# Patient Record
Sex: MALE | Race: WHITE | NOT HISPANIC OR LATINO | Employment: FULL TIME | URBAN - METROPOLITAN AREA
[De-identification: names, ages, dates, MRNs, and addresses within clinical notes are randomized per-mention and may not be internally consistent; named-entity substitution may affect disease eponyms.]

---

## 2023-06-16 LAB — HBA1C MFR BLD HPLC: 7.3 %

## 2023-07-20 ENCOUNTER — TELEPHONE (OUTPATIENT)
Dept: GASTROENTEROLOGY | Facility: CLINIC | Age: 58
End: 2023-07-20

## 2023-07-20 ENCOUNTER — OFFICE VISIT (OUTPATIENT)
Dept: GASTROENTEROLOGY | Facility: CLINIC | Age: 58
End: 2023-07-20
Payer: COMMERCIAL

## 2023-07-20 VITALS
DIASTOLIC BLOOD PRESSURE: 70 MMHG | WEIGHT: 212.2 LBS | HEIGHT: 72 IN | BODY MASS INDEX: 28.74 KG/M2 | SYSTOLIC BLOOD PRESSURE: 124 MMHG

## 2023-07-20 DIAGNOSIS — Z12.11 SCREENING FOR COLON CANCER: ICD-10-CM

## 2023-07-20 DIAGNOSIS — R19.4 CHANGE IN BOWEL HABITS: Primary | ICD-10-CM

## 2023-07-20 DIAGNOSIS — R19.7 DIARRHEA, UNSPECIFIED TYPE: ICD-10-CM

## 2023-07-20 PROCEDURE — 99204 OFFICE O/P NEW MOD 45 MIN: CPT | Performed by: INTERNAL MEDICINE

## 2023-07-20 RX ORDER — BUPROPION HYDROCHLORIDE 300 MG/1
300 TABLET ORAL DAILY
COMMUNITY
Start: 2023-06-27

## 2023-07-20 RX ORDER — LISINOPRIL 5 MG/1
5 TABLET ORAL DAILY
COMMUNITY
Start: 2023-06-27

## 2023-07-20 RX ORDER — ROSUVASTATIN CALCIUM 20 MG/1
20 TABLET, COATED ORAL DAILY
COMMUNITY
Start: 2023-06-27

## 2023-07-20 RX ORDER — ESCITALOPRAM OXALATE 20 MG/1
20 TABLET ORAL DAILY
COMMUNITY
Start: 2023-06-27

## 2023-07-20 RX ORDER — CHOLESTYRAMINE 4 G/9G
1 POWDER, FOR SUSPENSION ORAL
Qty: 30 PACKET | Refills: 2 | Status: SHIPPED | OUTPATIENT
Start: 2023-07-20

## 2023-07-20 NOTE — TELEPHONE ENCOUNTER
Scheduled date of colonoscopy (as of today): 8/22/2023  Physician performing colonoscopy: Dr. Ford President  Location of colonoscopy: [facility])  Bowel prep reviewed with patient: Miralax/Dulcolax  Instructions reviewed with patient by: Gave pt instructions packet and diabetic sheet  Clearances: n/a

## 2023-07-20 NOTE — PROGRESS NOTES
[address] [place] [address] [facility] - Outpatient Consultation  Diogenes Morales 66 y.o. male MRN: [id number]  Encounter: [id number]    ASSESSMENT AND PLAN:      1. Change in bowel habits  Acute on chronic issues, began with soft, more frequent stools after cholecystectomy in 2013. More recently, rectal spasm began about 6 months ago. Normal rectal exam today  We will add cholestyramine for suspected bile salt diarrhea, titrating dose as needed  Plan colonoscopy to assess for IBD and microscopic colitis    2. Colon cancer screening  No prior colon cancer screening, diagnostic colonoscopy planned      Followup Appointment: Pending colonoscopy  ______________________________________________________________________    Chief Complaint   Patient presents with   • Trouble having Bowel Movement     Pt states he has difficulty having a bowel movement for couple months. Bowel movements have varying consistency for years (2013). Pt also states he experiences gas when going. Pt has not seen any blood in stool. HPI:   Diogenes Morales is a 66y.o. year old male who presents accompanied by his wife for consultation regarding a change in bowel habits. He began to experience softer more frequent stools since cholecystectomy in 2013. Stools are always loose. He could have up to 5 or 6 stools in the daily mostly in the morning, occasionally have a BM later in the day. Stools are nonurgent and there is no abdominal cramping. He denies any rectal bleeding. Since February he is also noted anal rectal spasticity after moving his bowels. There is no kimberly pain with defecation. This symptom resolves without intervention. Historical Information   History reviewed. No pertinent past medical history.   Past Surgical History:   Procedure Laterality Date   • GALLBLADDER SURGERY  7235   • UMBILICAL HERNIA REPAIR  2013     Social History     Substance and Sexual Activity   Alcohol Use Never     Social History     Substance and Sexual Activity   Drug Use Never     Social History     Tobacco Use   Smoking Status Former   • Years: 10.00   • Types: Cigarettes   Smokeless Tobacco Never     Family History   Problem Relation Age of Onset   • No Known Problems Mother    • No Known Problems Father        Meds/Allergies     Current Outpatient Medications:   •  cholestyramine (QUESTRAN) 4 g packet  •  buPROPion (WELLBUTRIN XL) 300 mg 24 hr tablet  •  escitalopram (LEXAPRO) 20 mg tablet  •  lisinopril (ZESTRIL) 5 mg tablet  •  metFORMIN (GLUCOPHAGE) 1000 MG tablet  •  rosuvastatin (CRESTOR) 20 MG tablet    Allergies   Allergen Reactions   • Atorvastatin Myalgia       PHYSICAL EXAM:    Blood pressure 124/70, height 6' (1.829 m), weight 96.3 kg (212 lb 3.2 oz). Body mass index is 28.78 kg/m². General Appearance: NAD, cooperative, alert  Eyes: Anicteric, PERRLA, EOMI  ENT:  Normocephalic, atraumatic, normal mucosa. Neck:  Supple, symmetrical, trachea midline,   Resp:  Clear to auscultation bilaterally; no rales, rhonchi or wheezing; respirations unlabored   CV:  S1 S2, Regular rate and rhythm; no murmur, rub, or gallop. GI:  Soft, non-tender, non-distended; normal bowel sounds; no masses, no organomegaly   Rectal: Normal-no fissure or tear, normal tone  Musculoskeletal: No cyanosis, clubbing or edema. Normal ROM. Skin:  No jaundice, rashes, or lesions   Heme/Lymph: No palpable cervical lymphadenopathy  Psych: Normal affect, good eye contact  Neuro: No gross deficits, AAOx3    Lab Results:   No results found for: "WBC", "HGB", "HCT", "MCV", "PLT"  No results found for: "NA", "K", "CL", "CO2", "ANIONGAP", "BUN", "CREATININE", "GLUCOSE", "GLUF", "CALCIUM", "CORRECTEDCA", "AST", "ALT", "ALKPHOS", "PROT", "BILITOT", "EGFR"  No results found for: "IRON", "TIBC", "FERRITIN"  No results found for: "LIPASE"    Radiology Results:   No results found. REVIEW OF SYSTEMS:    CONSTITUTIONAL: Denies any fever, chills, rigors, and weight loss.   HEENT: No earache or tinnitus. Denies hearing loss or visual disturbances. CARDIOVASCULAR: No chest pain or palpitations. RESPIRATORY: Denies any cough, hemoptysis, shortness of breath or dyspnea on exertion. GASTROINTESTINAL: As noted in the History of Present Illness. GENITOURINARY: No problems with urination. Denies any hematuria or dysuria. NEUROLOGIC: No dizziness or vertigo, denies headaches. MUSCULOSKELETAL: Denies any muscle or joint pain. SKIN: Denies skin rashes or itching. ENDOCRINE: Denies excessive thirst. Denies intolerance to heat or cold. PSYCHOSOCIAL: Denies depression or anxiety. Denies any recent memory loss.

## 2023-08-09 ENCOUNTER — TELEPHONE (OUTPATIENT)
Dept: GASTROENTEROLOGY | Facility: CLINIC | Age: 58
End: 2023-08-09

## 2023-08-22 ENCOUNTER — ANESTHESIA (OUTPATIENT)
Dept: GASTROENTEROLOGY | Facility: AMBULATORY SURGERY CENTER | Age: 58
End: 2023-08-22

## 2023-08-22 ENCOUNTER — HOSPITAL ENCOUNTER (OUTPATIENT)
Dept: GASTROENTEROLOGY | Facility: AMBULATORY SURGERY CENTER | Age: 58
Discharge: HOME | End: 2023-08-22
Payer: COMMERCIAL

## 2023-08-22 ENCOUNTER — ANESTHESIA EVENT (OUTPATIENT)
Dept: GASTROENTEROLOGY | Facility: AMBULATORY SURGERY CENTER | Age: 58
End: 2023-08-22

## 2023-08-22 VITALS
OXYGEN SATURATION: 99 % | BODY MASS INDEX: 29.8 KG/M2 | WEIGHT: 220 LBS | SYSTOLIC BLOOD PRESSURE: 121 MMHG | RESPIRATION RATE: 19 BRPM | TEMPERATURE: 98.2 F | HEART RATE: 70 BPM | DIASTOLIC BLOOD PRESSURE: 80 MMHG | HEIGHT: 72 IN

## 2023-08-22 DIAGNOSIS — R19.7 DIARRHEA, UNSPECIFIED TYPE: ICD-10-CM

## 2023-08-22 DIAGNOSIS — K62.89 RECTAL MASS: Primary | ICD-10-CM

## 2023-08-22 PROCEDURE — 88342 IMHCHEM/IMCYTCHM 1ST ANTB: CPT | Performed by: PATHOLOGY

## 2023-08-22 PROCEDURE — 45385 COLONOSCOPY W/LESION REMOVAL: CPT | Performed by: INTERNAL MEDICINE

## 2023-08-22 PROCEDURE — 45380 COLONOSCOPY AND BIOPSY: CPT | Performed by: INTERNAL MEDICINE

## 2023-08-22 PROCEDURE — 88305 TISSUE EXAM BY PATHOLOGIST: CPT | Performed by: PATHOLOGY

## 2023-08-22 PROCEDURE — 88341 IMHCHEM/IMCYTCHM EA ADD ANTB: CPT | Performed by: PATHOLOGY

## 2023-08-22 RX ORDER — SODIUM CHLORIDE, SODIUM LACTATE, POTASSIUM CHLORIDE, CALCIUM CHLORIDE 600; 310; 30; 20 MG/100ML; MG/100ML; MG/100ML; MG/100ML
50 INJECTION, SOLUTION INTRAVENOUS CONTINUOUS
Status: DISCONTINUED | OUTPATIENT
Start: 2023-08-22 | End: 2023-08-26 | Stop reason: HOSPADM

## 2023-08-22 RX ORDER — PROPOFOL 10 MG/ML
INJECTION, EMULSION INTRAVENOUS AS NEEDED
Status: DISCONTINUED | OUTPATIENT
Start: 2023-08-22 | End: 2023-08-22

## 2023-08-22 RX ORDER — LIDOCAINE HYDROCHLORIDE 10 MG/ML
INJECTION, SOLUTION EPIDURAL; INFILTRATION; INTRACAUDAL; PERINEURAL AS NEEDED
Status: DISCONTINUED | OUTPATIENT
Start: 2023-08-22 | End: 2023-08-22

## 2023-08-22 RX ADMIN — PROPOFOL 20 MG: 10 INJECTION, EMULSION INTRAVENOUS at 11:46

## 2023-08-22 RX ADMIN — PROPOFOL 150 MG: 10 INJECTION, EMULSION INTRAVENOUS at 11:42

## 2023-08-22 RX ADMIN — LIDOCAINE HYDROCHLORIDE 30 MG: 10 INJECTION, SOLUTION EPIDURAL; INFILTRATION; INTRACAUDAL; PERINEURAL at 11:42

## 2023-08-22 RX ADMIN — PROPOFOL 30 MG: 10 INJECTION, EMULSION INTRAVENOUS at 11:44

## 2023-08-22 RX ADMIN — SODIUM CHLORIDE, SODIUM LACTATE, POTASSIUM CHLORIDE, CALCIUM CHLORIDE 50 ML/HR: 600; 310; 30; 20 INJECTION, SOLUTION INTRAVENOUS at 11:31

## 2023-08-22 RX ADMIN — PROPOFOL 20 MG: 10 INJECTION, EMULSION INTRAVENOUS at 11:48

## 2023-08-22 RX ADMIN — PROPOFOL 20 MG: 10 INJECTION, EMULSION INTRAVENOUS at 11:51

## 2023-08-22 RX ADMIN — PROPOFOL 50 MG: 10 INJECTION, EMULSION INTRAVENOUS at 11:54

## 2023-08-22 RX ADMIN — PROPOFOL 30 MG: 10 INJECTION, EMULSION INTRAVENOUS at 12:01

## 2023-08-22 RX ADMIN — PROPOFOL 30 MG: 10 INJECTION, EMULSION INTRAVENOUS at 11:57

## 2023-08-22 NOTE — ANESTHESIA POSTPROCEDURE EVALUATION
Post-Op Assessment Note    CV Status:  Stable    Pain management: adequate     Mental Status:  Sleepy   Hydration Status:  Euvolemic   PONV Controlled:  Controlled   Airway Patency:  Patent      Post Op Vitals Reviewed: Yes      Staff: Anesthesiologist, CRNA         There were no known notable events for this encounter.     BP 95/56 (08/22/23 1212)    Temp      Pulse 67 (08/22/23 1212)   Resp 16 (08/22/23 1212)    SpO2 96 % (08/22/23 1212)

## 2023-08-22 NOTE — PROGRESS NOTES
Pt given written and verbal d/c instructions with wife at bedside. Dr Semaj Brown reviewed results with pt and discussed having blood work and CT scan done. Wife aware of all future procedures and supportive with pt care.

## 2023-08-22 NOTE — ANESTHESIA PREPROCEDURE EVALUATION
Procedure:  COLONOSCOPY    Relevant Problems   CARDIO   (+) Hyperlipidemia      Endocrine   (+) Diabetes mellitus ([address])        Physical Exam    Airway    Mallampati score: I  TM Distance: >3 FB  Neck ROM: full     Dental   No notable dental hx     Cardiovascular  Cardiovascular exam normal    Pulmonary  Pulmonary exam normal     Other Findings        Anesthesia Plan  ASA Score- 2     Anesthesia Type- IV sedation with anesthesia with ASA Monitors. Additional Monitors:   Airway Plan:     Comment: I discussed risks (reviewed with patient on the anesthesia consent form), benefits and alternatives of monitored sedation including the possibility under sedation to have recall or mild discomfort. .       Plan Factors-    Chart reviewed. Patient summary reviewed. Induction- intravenous. Postoperative Plan-     Informed Consent- Anesthetic plan and risks discussed with patient. I personally reviewed this patient with the CRNA. Discussed and agreed on the Anesthesia Plan with the CRNA. Patricia Aguilar

## 2023-08-24 ENCOUNTER — TELEPHONE (OUTPATIENT)
Dept: GASTROENTEROLOGY | Facility: CLINIC | Age: 58
End: 2023-08-24

## 2023-08-24 DIAGNOSIS — K62.89 RECTAL MASS: Primary | ICD-10-CM

## 2023-08-25 ENCOUNTER — OFFICE VISIT (OUTPATIENT)
Age: 58
End: 2023-08-25
Payer: COMMERCIAL

## 2023-08-25 VITALS — HEIGHT: 72 IN | BODY MASS INDEX: 27.77 KG/M2 | WEIGHT: 205 LBS

## 2023-08-25 DIAGNOSIS — K62.89 RECTAL MASS: Primary | ICD-10-CM

## 2023-08-25 PROCEDURE — 99205 OFFICE O/P NEW HI 60 MIN: CPT | Performed by: COLON & RECTAL SURGERY

## 2023-08-25 PROCEDURE — 88305 TISSUE EXAM BY PATHOLOGIST: CPT | Performed by: PATHOLOGY

## 2023-08-25 NOTE — PROGRESS NOTES
Colon and Rectal Surgery   Collette Pate 66 y.o. male MRN: [id number]   Encounter: [id number]  08/26/23   3:00 PM        ASSESSMENT:    Dilan Hills is a 64yo male, new rectal mass on colonoscopy, pathology as returned adenocarcinoma by the time of this writing. This was his first colonoscopy, additional benign colon polyps. Discussed with him low, locally advanced rectal cancer, his symptoms and location suggest likely sphincter invasion, he has lost ~20lbs in the past few months. Anterior based fungating, fixed rectal tumor, at the dentate line. We discussed staging to include labs/CT/MRI that have been ordered and the likelihood of neoadjuvant chemoradation therapy pending these results prior to consideration of surgical resection, he does understand that with this low a lesion the likelihood of abdominoperineal resection/colostomy is high. PLAN:  Labs/CT imaging already scheduled  MRI rectal cancer protocol ordered, this will be scheduled by navigation  He will be discussed in tumor conference  CC:  DO Dr. Dar Uriostegui, RN  Jeff Agudelo  GI Tumor Conference      HPI  Coleltte Pate is a 66 y.o. male is here today for evaluation of rectal mass. His most recent colonoscopy was on 8/22/23 with Dr. Deangelo Calero which revealed:  • The terminal ileum appeared normal.  • The entire colon appeared normal. Performed random biopsy using biopsy forceps. • 4 subcentimeter polyps were removed  • 12 mm polyp in the mid sigmoid colon; removed by hot snare  • 15 mm polyp in the distal sigmoid colon; removed by hot snare  • Friable, malignant-appearing and ulcerated mass measuring 7 cm x 5 cm in the distal rectum 0 cm from the anal verge, covering one half of the circumference; bleeding occurred after intervention; performed partial removal by cold forceps biopsy    Pathology-  A.  Colon, random colon biopsy:  - Fragments of colonic mucosa with increased intraepithelial lymphocytes and focal surface epithelial damage.  - There is no increase in subepithelial collagen thickness. See note.     Note (A): The above morphologic features may be compatible with lymphocytic (microscopic) colitis in conjunction with appropriate clinical and endoscopic findings.       B. Large Intestine, Transverse Colon, hot snare transverse colon polyp:  - Tubular adenoma, fragments. - Negative for high grade dysplasia/ carcinoma.     C. Large Intestine, Left/Descending Colon, hot snare descending colon polyp:  - Benign colonic mucosa without significant microscopic abnormality.     D. Large Intestine, Sigmoid Colon, hot snare mid sigmoid polyp:  - Tubular adenoma. - Negative for high grade dysplasia/ carcinoma.     E. Large Intestine, Sigmoid Colon, distal sigmoid polyps- hot snare  x1 / cold snare  2:  - Tubular adenoma x 2, negative for high grade dysplasia/ carcinoma. - Hyperplastic polyp, negative for dysplasia/ carcinoma.      F. Rectum, biopsy rectal mass:  - Adenocarcinoma, superficial fragments. See note. - MMR panel is pending.       CEA, CBC, CMP ordered     He is scheduled for CT scan on 9/9/23     He denies a family history of colorectal cancer.      Historical Information   Past Medical History:   Diagnosis Date   • Diabetes mellitus ([address])    • Hyperlipidemia      Past Surgical History:   Procedure Laterality Date   • GALLBLADDER SURGERY  2013   • TONSILLECTOMY     • UMBILICAL HERNIA REPAIR  2013       Meds/Allergies       Current Outpatient Medications:   •  buPROPion (WELLBUTRIN XL) 300 mg 24 hr tablet, Take 300 mg by mouth daily, Disp: , Rfl:   •  cholestyramine (QUESTRAN) 4 g packet, Take 1 packet (4 g total) by mouth daily at bedtime, Disp: 30 packet, Rfl: 2  •  escitalopram (LEXAPRO) 20 mg tablet, Take 20 mg by mouth daily, Disp: , Rfl:   •  lisinopril (ZESTRIL) 5 mg tablet, Take 5 mg by mouth daily, Disp: , Rfl:   •  metFORMIN (GLUCOPHAGE) 1000 MG tablet, Take 1,000 mg by mouth 2 (two) times a day, Disp: , Rfl: •  rosuvastatin (CRESTOR) 20 MG tablet, Take 20 mg by mouth daily, Disp: , Rfl:   No current facility-administered medications for this visit. Allergies   Allergen Reactions   • Atorvastatin Myalgia         Social History   Social History     Substance and Sexual Activity   Alcohol Use Never     Social History     Substance and Sexual Activity   Drug Use Never     Social History     Tobacco Use   Smoking Status Former   • Years: 10.00   • Types: Cigarettes   Smokeless Tobacco Never         Family History:   Family History   Problem Relation Age of Onset   • No Known Problems Mother    • No Known Problems Father        Review of Systems   Constitutional: Positive for unexpected weight change. HENT: Negative. Eyes: Negative. Respiratory: Negative. Cardiovascular: Negative. Gastrointestinal: Positive for rectal pain. Endocrine: Negative. Genitourinary: Negative. Musculoskeletal: Negative. Skin: Negative. Allergic/Immunologic: Negative. Neurological: Negative. Hematological: Negative. Psychiatric/Behavioral: Negative. Objective   Current Vitals:   Vitals:    08/25/23 1257   Weight: 93 kg (205 lb)   Height: 6' (1.829 m)       Physical Exam:  General:no distress  Eyes:perrla/eomi  ENT:moist mucus membranes  Neck:supple  Pulm:no increased work of breathing, clear bilateral  CV:sinus  Abdomen:soft,nontender  Rectal:Anterior based fungating, fixed rectal tumor, at the dentate line. Extremities:no edema      I have spent a total time of 55 minutes on 08/26/23 in caring for this patient including Diagnostic results, Prognosis, Risks and benefits of tx options, Patient and family education, Impressions, Counseling / Coordination of care, Documenting in the medical record, Reviewing / ordering tests, medicine, procedures   and Communicating with other healthcare professionals .

## 2023-08-25 NOTE — RESULT ENCOUNTER NOTE
Left voicemail, 1 year colonoscopy recall  Random biopsies suggest microscopic colitis, will hold off on treatment until surgical plans are confirmed  Expected findings.   Already saw colorectal surgery  Labs, CT and MRI planned

## 2023-08-26 NOTE — PATIENT INSTRUCTIONS
ASSESSMENT:    Sony Ball is a 66yo male, new rectal mass on colonoscopy, pathology as returned adenocarcinoma by the time of this writing. This was his first colonoscopy, additional benign colon polyps. Discussed with him low, locally advanced rectal cancer, his symptoms and location suggest likely sphincter invasion, he has lost ~20lbs in the past few months. Anterior based fungating, fixed rectal tumor, at the dentate line. We discussed staging to include labs/CT/MRI that have been ordered and the likelihood of neoadjuvant chemoradation therapy pending these results prior to consideration of surgical resection, he does understand that with this low a lesion the likelihood of abdominoperineal resection/colostomy is high.     PLAN:  Labs/CT imaging already scheduled  MRI rectal cancer protocol ordered, this will be scheduled by navigation  He will be discussed in tumor conference  CC:  DO Dr. Chey Tovar, RN  Zach More  GI Tumor Conference

## 2023-08-29 ENCOUNTER — PATIENT OUTREACH (OUTPATIENT)
Dept: HEMATOLOGY ONCOLOGY | Facility: CLINIC | Age: 58
End: 2023-08-29

## 2023-08-29 ENCOUNTER — DOCUMENTATION (OUTPATIENT)
Dept: HEMATOLOGY ONCOLOGY | Facility: CLINIC | Age: 58
End: 2023-08-29

## 2023-08-29 ENCOUNTER — PATIENT OUTREACH (OUTPATIENT)
Dept: CASE MANAGEMENT | Facility: HOSPITAL | Age: 58
End: 2023-08-29

## 2023-08-29 DIAGNOSIS — C20 RECTAL ADENOCARCINOMA (HCC): Primary | ICD-10-CM

## 2023-08-29 NOTE — PROGRESS NOTES
Port placement scheduled with IR  for 9/18/2023 [address]   NN Crystal will confirm details with the pt

## 2023-08-29 NOTE — PROGRESS NOTES
NN initial phone outreach to the pt, introduced myself and explained my role to the pt and his wife. I mainly spoke to his wife but the pt was on the call as well. We talked about the next steps since his recent confirmed path report, we discussed resched his MRI and CT sooner than the dates scheduled, I explained I would do what I can to get the scans sched sooner, Colten Goldberg voiced her concern for auth which I reassured her that we would make sure would not interfere w the pt having his scans. I let her know I was going to set Prisca Mendoza up w med onc following his scans and would call her back w the details. We rvwd the newly sched MRI and CT and we sched the pt w Dr. Aida Markham on 9/13. I explained the pt would need to travel to see the physician but this would only be q4-6 wks, he could still be tx at [address] [facility] since they live closest to the [facility]. She agreed to the pt seeing Dr. Aida Markham, I explained the break down in extent and emailed this info to the pt's wife about the standard of treatment for rectal cancer patients, I did stress to her that this was based on the staging of the CT and MRI. Once we had that info then we would decide what course of tx would be best for him. Explained he would most likely have chemo upfront X4 months, chemo/RT X5-6 wks, and then surgery. We spoke about the other options of upfront sx or oral chemo/IV Oxali (3 wks on 1 wk off). She asked that I email her all this information in addition to info on the [facility]. We completed the general assessment, referrals placed for IR for port and onc SW. We talked about the port, and set him up for an appt on 9/18. I reminded her that he will have to get his blood work completed and best would be before his CT scan, she said that he is sched to have blood work on 9/5. Wife has my card and contact info, she knows she can reach out to me anytime for help, provided her w the hopeline contact number in case the pt would need to reach out after hours.  She was very appreciative of all the info and help today.

## 2023-08-29 NOTE — PROGRESS NOTES
[facility] received referral. Patient has consult scheduled with Dr Jose F Morales on 9/13/23. [facility] will follow.

## 2023-08-29 NOTE — PROGRESS NOTES
TB rqst sent to GI ONC TB pool for this pt to be presented on 9/14 as a NEW rectal CA, to be presented by Dr. Nancy Crews.

## 2023-08-29 NOTE — PROGRESS NOTES
In-basket message received from Dr. Ray Acevedo to add patient to the [address] on 9/14/2023. Chart reviewed and prep started. Pending blood work, CT and MRI. I will follow up on results.

## 2023-09-06 LAB
ALBUMIN SERPL-MCNC: 4.7 G/DL (ref 3.8–4.9)
ALBUMIN/GLOB SERPL: 2.2 {RATIO} (ref 1.2–2.2)
ALP SERPL-CCNC: 84 IU/L (ref 44–121)
ALT SERPL-CCNC: 35 IU/L (ref 0–44)
AST SERPL-CCNC: 19 IU/L (ref 0–40)
BILIRUB SERPL-MCNC: 0.8 MG/DL (ref 0–1.2)
BUN SERPL-MCNC: 19 MG/DL (ref 6–24)
BUN/CREAT SERPL: 20 (ref 9–20)
CALCIUM SERPL-MCNC: 9.9 MG/DL (ref 8.7–10.2)
CEA SERPL-MCNC: 36.2 NG/ML (ref 0–4.7)
CHLORIDE SERPL-SCNC: 103 MMOL/L (ref 96–106)
CO2 SERPL-SCNC: 22 MMOL/L (ref 20–29)
CREAT SERPL-MCNC: 0.94 MG/DL (ref 0.76–1.27)
EGFR: 95 ML/MIN/1.73
ERYTHROCYTE [DISTWIDTH] IN BLOOD BY AUTOMATED COUNT: 12.5 % (ref 11.6–15.4)
GLOBULIN SER-MCNC: 2.1 G/DL (ref 1.5–4.5)
GLUCOSE SERPL-MCNC: 153 MG/DL (ref 70–99)
HCT VFR BLD AUTO: 47 % (ref 37.5–51)
HGB BLD-MCNC: 16 G/DL (ref 13–17.7)
MCH RBC QN AUTO: 32.1 PG (ref 26.6–33)
MCHC RBC AUTO-ENTMCNC: 34 G/DL (ref 31.5–35.7)
MCV RBC AUTO: 94 FL (ref 79–97)
PLATELET # BLD AUTO: 267 X10E3/UL (ref 150–450)
POTASSIUM SERPL-SCNC: 4.9 MMOL/L (ref 3.5–5.2)
PROT SERPL-MCNC: 6.8 G/DL (ref 6–8.5)
RBC # BLD AUTO: 4.98 X10E6/UL (ref 4.14–5.8)
SODIUM SERPL-SCNC: 141 MMOL/L (ref 134–144)
WBC # BLD AUTO: 7.7 X10E3/UL (ref 3.4–10.8)

## 2023-09-07 ENCOUNTER — HOSPITAL ENCOUNTER (OUTPATIENT)
Dept: CT IMAGING | Facility: HOSPITAL | Age: 58
Discharge: HOME | End: 2023-09-07
Attending: INTERNAL MEDICINE
Payer: COMMERCIAL

## 2023-09-07 DIAGNOSIS — K62.89 RECTAL MASS: ICD-10-CM

## 2023-09-07 PROCEDURE — G1004 CDSM NDSC: HCPCS

## 2023-09-07 PROCEDURE — 74177 CT ABD & PELVIS W/CONTRAST: CPT

## 2023-09-07 PROCEDURE — 71260 CT THORAX DX C+: CPT

## 2023-09-07 RX ORDER — SODIUM CHLORIDE 9 MG/ML
30 INJECTION, SOLUTION INTRAVENOUS CONTINUOUS
OUTPATIENT
Start: 2023-09-07

## 2023-09-07 RX ORDER — CEFAZOLIN SODIUM 2 G/50ML
2000 SOLUTION INTRAVENOUS ONCE
OUTPATIENT
Start: 2023-09-18

## 2023-09-07 RX ADMIN — IOHEXOL 80 ML: 350 INJECTION, SOLUTION INTRAVENOUS at 06:50

## 2023-09-08 NOTE — PROGRESS NOTES
CEA completed on 9/5, CT completed on 9/7 no final read as of yet. MRI scheduled for 9/11. I will call radiology reading room on 9/12 AM for STAT read of MRI and follow up on CT results.

## 2023-09-11 ENCOUNTER — HOSPITAL ENCOUNTER (OUTPATIENT)
Facility: MEDICAL CENTER | Age: 58
Discharge: HOME | End: 2023-09-11
Attending: COLON & RECTAL SURGERY
Payer: COMMERCIAL

## 2023-09-11 ENCOUNTER — TELEPHONE (OUTPATIENT)
Dept: INTERVENTIONAL RADIOLOGY/VASCULAR | Facility: HOSPITAL | Age: 58
End: 2023-09-11

## 2023-09-11 DIAGNOSIS — K62.89 RECTAL MASS: ICD-10-CM

## 2023-09-11 PROCEDURE — G1004 CDSM NDSC: HCPCS

## 2023-09-11 PROCEDURE — 72195 MRI PELVIS W/O DYE: CPT

## 2023-09-13 ENCOUNTER — CONSULT (OUTPATIENT)
Dept: HEMATOLOGY ONCOLOGY | Facility: CLINIC | Age: 58
End: 2023-09-13
Payer: COMMERCIAL

## 2023-09-13 ENCOUNTER — TELEPHONE (OUTPATIENT)
Dept: HEMATOLOGY ONCOLOGY | Facility: CLINIC | Age: 58
End: 2023-09-13

## 2023-09-13 ENCOUNTER — DOCUMENTATION (OUTPATIENT)
Dept: OTHER | Facility: HOSPITAL | Age: 58
End: 2023-09-13

## 2023-09-13 VITALS
TEMPERATURE: 98.8 F | HEART RATE: 75 BPM | WEIGHT: 213 LBS | OXYGEN SATURATION: 98 % | HEIGHT: 72 IN | SYSTOLIC BLOOD PRESSURE: 118 MMHG | RESPIRATION RATE: 16 BRPM | BODY MASS INDEX: 28.85 KG/M2 | DIASTOLIC BLOOD PRESSURE: 76 MMHG

## 2023-09-13 DIAGNOSIS — T45.1X5A CHEMOTHERAPY INDUCED DIARRHEA: ICD-10-CM

## 2023-09-13 DIAGNOSIS — Z86.39 HISTORY OF DIABETES MELLITUS: ICD-10-CM

## 2023-09-13 DIAGNOSIS — D70.1 CHEMOTHERAPY INDUCED NEUTROPENIA: ICD-10-CM

## 2023-09-13 DIAGNOSIS — R11.0 CHEMOTHERAPY-INDUCED NAUSEA: Primary | ICD-10-CM

## 2023-09-13 DIAGNOSIS — T45.1X5A CHEMOTHERAPY-INDUCED NAUSEA: ICD-10-CM

## 2023-09-13 DIAGNOSIS — G89.3 CANCER RELATED PAIN: ICD-10-CM

## 2023-09-13 DIAGNOSIS — K52.1 CHEMOTHERAPY INDUCED DIARRHEA: ICD-10-CM

## 2023-09-13 DIAGNOSIS — T45.1X5A CHEMOTHERAPY INDUCED NEUTROPENIA: ICD-10-CM

## 2023-09-13 DIAGNOSIS — C20 RECTAL ADENOCARCINOMA (HCC): Primary | ICD-10-CM

## 2023-09-13 DIAGNOSIS — E78.5 DYSLIPIDEMIA: ICD-10-CM

## 2023-09-13 DIAGNOSIS — R11.0 CHEMOTHERAPY-INDUCED NAUSEA: ICD-10-CM

## 2023-09-13 DIAGNOSIS — C77.9 REGIONAL LYMPH NODE METASTASIS PRESENT (HCC): ICD-10-CM

## 2023-09-13 DIAGNOSIS — R97.8 ABNORMAL TUMOR MARKERS: ICD-10-CM

## 2023-09-13 DIAGNOSIS — T45.1X5A CHEMOTHERAPY-INDUCED NAUSEA: Primary | ICD-10-CM

## 2023-09-13 PROCEDURE — 99205 OFFICE O/P NEW HI 60 MIN: CPT | Performed by: INTERNAL MEDICINE

## 2023-09-13 RX ORDER — TRAMADOL HYDROCHLORIDE 50 MG/1
50 TABLET ORAL EVERY 4 HOURS PRN
Qty: 40 TABLET | Refills: 0 | Status: SHIPPED | OUTPATIENT
Start: 2023-09-13 | End: 2023-09-21 | Stop reason: SDUPTHER

## 2023-09-13 RX ORDER — ONDANSETRON 4 MG/1
TABLET, FILM COATED ORAL
Qty: 20 TABLET | Refills: 1 | Status: SHIPPED | OUTPATIENT
Start: 2023-09-13

## 2023-09-13 RX ORDER — IBUPROFEN 200 MG
600 TABLET ORAL EVERY 6 HOURS
COMMUNITY

## 2023-09-13 NOTE — TELEPHONE ENCOUNTER
What would be a preferred day of the week that would work best for your infusion appointment? Monday w/ Weds disconnect  Do you prefer mornings or afternoons for your appointments? AM  Are there any days or dates that do not work for your schedule, including any upcoming vacations? N/a  We are going to try our best to schedule you at the infusion center closest to your home. In the event that we are unable to what would be your next preferred infusion site or sites? [facility]    Do you have transportation to take you to all of your appointments?  yes  Would you like the infusion center to draw labs from your port? (disregard if patient doesn't have a port or need labs for infusion appointment) outpatient

## 2023-09-13 NOTE — H&P (VIEW-ONLY)
Consultation - Medical Oncology   João Roper 66 y.o. male MRN: [id number]  Unit/Bed#:  Encounter: [id number]  Referring physician: Dr. Caren Oates  Date of service: 9/13/2023  Reason for Consult: Low rectal cancer  HPI: João Roper is a 66y.o. year old male. Patient is here with his wife. For the last 3-4 months there has been narrowness of the stool column. He has lost 20 pounds in the last 3-4 months. Posterior rectal cancer biopsy there was slight bleeding. Rectal pain. History of diabetes mellitus, hyperlipidemia and depression/anxiety. 60 pack history of smoking. Patient quit smoking cigarettes 12 years ago. No alcohol. No family history of cancer. Patient had a colonoscopy in August 2023 and this was his first colonoscopy and that showed anterior based fungating fixed rectal tumor at the dentate line. Additional benign polyps. A. Colon, random colon biopsy:  - Fragments of colonic mucosa with increased intraepithelial lymphocytes and focal surface epithelial damage.  - There is no increase in subepithelial collagen thickness. See note.     Note (A): The above morphologic features may be compatible with lymphocytic (microscopic) colitis in conjunction with appropriate clinical and endoscopic findings.       B. Large Intestine, Transverse Colon, hot snare transverse colon polyp:  - Tubular adenoma, fragments. - Negative for high grade dysplasia/ carcinoma.     C. Large Intestine, Left/Descending Colon, hot snare descending colon polyp:  - Benign colonic mucosa without significant microscopic abnormality.     D. Large Intestine, Sigmoid Colon, hot snare mid sigmoid polyp:  - Tubular adenoma. - Negative for high grade dysplasia/ carcinoma.     E. Large Intestine, Sigmoid Colon, distal sigmoid polyps- hot snare  x1 / cold snare  2:  - Tubular adenoma x 2, negative for high grade dysplasia/ carcinoma. - Hyperplastic polyp, negative for dysplasia/ carcinoma.      F.  Rectum, biopsy rectal mass:  - Adenocarcinoma, superficial fragments. See note. - MMR panel is pending.     Note (F): This case was reviewed at the intradepartmental  conference.    Dr. Marian Mackey is notified of the diagnosis in [employer] via [employer] on 2023  at 1.30 pm.   Electronically signed by Nash Forbes MD on 2023 at  1:41 PM     .Antibody          Clone               Description                           Results  MLH1               M1                   Mismatch repair protein       Intact nuclear expression  CMV1              [id number]       Mismatch repair protein       Intact nuclear expression  MBO8              44                     Mismatch repair protein       Intact nuclear expression  DDZ9              [id number]           Mismatch repair protein       Intact nuclear expression     Historical Information   Past Medical History:   Diagnosis Date   • Diabetes mellitus ([address])    • Hyperlipidemia      Past Surgical History:   Procedure Laterality Date   • GALLBLADDER SURGERY     • TONSILLECTOMY     • UMBILICAL HERNIA REPAIR       Social History   Social History     Substance and Sexual Activity   Alcohol Use Never     Social History     Substance and Sexual Activity   Drug Use Never     Social History     Tobacco Use   Smoking Status Former   • Packs/day: 2.00   • Years: 30.00   • Total pack years: 60.00   • Types: Cigarettes   • Quit date:    • Years since quittin.7   • Passive exposure: Past   Smokeless Tobacco Never     Family History:   Family History   Problem Relation Age of Onset   • No Known Problems Mother    • No Known Problems Father          Current Outpatient Medications:   •  buPROPion (WELLBUTRIN XL) 300 mg 24 hr tablet, Take 300 mg by mouth daily, Disp: , Rfl:   •  escitalopram (LEXAPRO) 20 mg tablet, Take 20 mg by mouth daily, Disp: , Rfl:   •  ibuprofen (MOTRIN) 200 mg tablet, Take 600 mg by mouth every 6 (six) hours, Disp: , Rfl:   •  lisinopril (ZESTRIL) 5 mg tablet, Take 5 mg by mouth daily, Disp: , Rfl:   •  metFORMIN (GLUCOPHAGE) 1000 MG tablet, Take 1,000 mg by mouth 2 (two) times a day, Disp: , Rfl:   •  rosuvastatin (CRESTOR) 20 MG tablet, Take 20 mg by mouth daily, Disp: , Rfl:   •  traMADol (Ultram) 50 mg tablet, Take 1 tablet (50 mg total) by mouth every 4 (four) hours as needed for moderate pain, Disp: 40 tablet, Rfl: 0    Allergies   Allergen Reactions   • Atorvastatin Myalgia   ROS:  09/13/23 Reviewed 12 systems: See symptoms in HPI  Presently no other neurological, cardiac, pulmonary, GI and  symptoms other than listed in HPI. Other symptoms are in HPI. No  fever, chills,  bone pains, skin rash,  night sweats, arthritic symptoms,  tiredness , weakness, numbness, claudication and gait problem. No frequent infections. Not unusually sensitive to heat or cold. No swelling of the ankles. No swollen glands. Patient is anxious. Physical Exam:  Vitals:    09/13/23 0939   BP: 118/76   BP Location: Left arm   Patient Position: Sitting   Cuff Size: Adult   Pulse: 75   Resp: 16   Temp: 98.8 °F (37.1 °C)   TempSrc: Temporal   SpO2: 98%   Weight: 96.6 kg (213 lb)   Height: 6' (1.829 m)     Alert, oriented, not in distress, vitals are above, no icterus, no oral thrush, no palpable neck mass, clear lung fields, regular heart rate, abdomen  soft and non tender, no palpable abdominal mass, no ascites, no edema of ankles, no calf tenderness, no focal neurological deficit, no skin rash, no palpable lymphadenopathy in the neck and axillary areas,  no clubbing. Patient is anxious. Performance status 0 . Lab Results: I have reviewed all pertinent labs.   LABS:  Results for orders placed or performed in visit on 09/05/23   Comprehensive metabolic panel   Result Value Ref Range    Glucose, Random 153 (H) 70 - 99 mg/dL    BUN 19 6 - 24 mg/dL    Creatinine 0.94 0.76 - 1.27 mg/dL    eGFR 95 >59 mL/min/1.73    SL AMB BUN/CREATININE RATIO 20 9 - 20    Sodium 141 134 - 144 mmol/L Potassium 4.9 3.5 - 5.2 mmol/L    Chloride 103 96 - 106 mmol/L    CO2 22 20 - 29 mmol/L    CALCIUM 9.9 8.7 - 10.2 mg/dL    Protein, Total 6.8 6.0 - 8.5 g/dL    Albumin 4.7 3.8 - 4.9 g/dL    Globulin, Total 2.1 1.5 - 4.5 g/dL    Albumin/Globulin Ratio 2.2 1.2 - 2.2    TOTAL BILIRUBIN 0.8 0.0 - 1.2 mg/dL    Alk Phos Isoenzymes 84 44 - 121 IU/L    AST 19 0 - 40 IU/L    ALT 35 0 - 44 IU/L   CBC   Result Value Ref Range    White Blood Cell Count 7.7 3.4 - 10.8 x10E3/uL    Red Blood Cell Count 4.98 4.14 - 5.80 x10E6/uL    Hemoglobin 16.0 13.0 - 17.7 g/dL    HCT 47.0 37.5 - 51.0 %    MCV 94 79 - 97 fL    MCH 32.1 26.6 - 33.0 pg    MCHC 34.0 31.5 - 35.7 g/dL    RDW 12.5 11.6 - 15.4 %    Platelet Count 148 441 - 450 x10E3/uL   CEA   Result Value Ref Range    CEA 36.2 (H) 0.0 - 4.7 ng/mL         Imaging Studies: I have personally reviewed pertinent reports. IMPRESSION:  Unenhanced MRI of the Pelvis (Rectal Protocol)     Low rectal cancer, 3.5 cm from the anal verge, 6.3 cm long, centered along the anterior rectal wall. Anteriorly, the tumor invades the right apex of the prostate (series 5 images 26-27.)     Overall MRI stage: T4b, N1, Low rectal cancer  MRF: Not applicable for T4 tumor. Sphincter involvement: Yes. There is internal sphincter invasion in the posterior half of the upper internal sphincter (series 8 images 14-15, and series 2 image 13.)  Suspicious extra mesorectal lymph nodes: There is a suspicious superior rectal chain 9 mm node (series 4 image 13 and series 7 image 13.)  EMVI: No     For the purpose of radiation therapy planning, series 5 and 7 would be most useful.  (Series 5 is a small field of view axial T2 weighted sequence typically most useful for radiation therapy planning, however the superior rectal chain node was not   included on this sequence, and is better seen on series 7.)                 Workstation performed: [id number]        Imaging    MRI pelvis rectal cancer staging wo contrast (Order: [id number]) - 9/11/2023  IMPRESSION:     No evidence of metastatic disease in the chest, abdomen, and pelvis.     Please see subsequent scheduled rectal cancer protocol pelvic MRI for evaluation of local tumor staging.                 Workstation performed: [id number]        Imaging    CT chest abdomen pelvis w contrast (Order: [id number]) - 9/7/2023    Pathology, and Other Studies: I have personally reviewed pertinent reports. See above    Assessment and Plan:  See diagnoses, orders instructions below    For the last 3-4 months there has been narrowness of the stool column. He has lost 20 pounds in the last 3-4 months. Posterior rectal cancer biopsy there was slight bleeding. Rectal pain. History of diabetes mellitus, hyperlipidemia and depression/anxiety. Physical examination and test results are as recorded and discussed. Discussed patient characteristics. Discussed cancer characteristics. Discussed T4 b N1 low ductal cancer involving the sphincter, locally advanced disease. No distant metastatic disease. CEA 36. Discussed total neoadjuvant therapy, FOLFOX followed by infusion 5-FU plus radiation followed by surgery. Discussed all this in very much detail. Patient is already scheduled to have Port-A-Cath. Chemotherapy to start after that. Discussed FOLFOX medications with the patient and his wife in detail and provided printed and verbal information and patient signed informed consent. Diet and activities as tolerated. Later on health screening test.  Patient is capable of self-care. Goal is cure from rectal cancer if possible. 1. Rectal adenocarcinoma ([address])    - Ambulatory Referral to Hematology / Oncology  - CBC and differential; Standing  - Comprehensive metabolic panel; Standing  - CEA; Standing  - CBC and differential  - Comprehensive metabolic panel  - CEA    2. Regional lymph node metastasis present ([address])      3. Abnormal tumor markers      4.  History of diabetes mellitus      5. Dyslipidemia      6. Chemotherapy-induced nausea      7. Chemotherapy induced diarrhea      8. Chemotherapy induced neutropenia (HCC)      9. Cancer related pain-patient has been taking a lot of Advil for rectal pain and he has requested pain medication. He should not be taking NSAIDs while on chemotherapy. Patient will accept tramadol.    - traMADol (Ultram) 50 mg tablet; Take 1 tablet (50 mg total) by mouth every 4 (four) hours as needed for moderate pain  Dispense: 40 tablet; Refill: 0    Informed consent for FOLFOX every 2 weeks X 8. Blood work 1 or 2 days prior to chemotherapy every 2 weeks. Patient has instructions about hydration, oral hygiene, avoid touching anything cold or drinking anything cold, Zofran for nausea and vomiting and Imodium for diarrhea. Has instructions about febrile neutropenia and bleeding. Not to take Advil. May take Tylenol. Prescription for tramadol. Chemotherapy to start after port insertion and Port-A-Cath is scheduled for next week Monday. Chemotherapy will be at [address]. Follow-up visit in 5 weeks. Patient will continue to follow with  primary physician and other consultants. Patient voiced understanding and agrees      Disclaimer: This document was prepared using dictation device. If a word or phrase is confusing, or does not make sense, this is likely due to recognition error which was not discovered during the providers review. If you believe an error has occurred, please Contact me through [employer] service for tiara? cation. Counseling / Coordination of Care  . Sarah Reed   Provided counseling and support

## 2023-09-13 NOTE — PROGRESS NOTES
Documentation of Informed Consent Process for Clinical Research Study    Study Title: [employer] 2021-05  Patient Name: Vaibhav Hooker  YOB: 1965    This signed and dated document shall serve as certification that all of the below listed required elements of informed consent were provided to the subject or legally authorized representative signing the actual Informed Consent Document, both in written and verbal format. The HIPAA consent is contained within the Informed Consent document, and has also been discussed. A copy of the actual signed and dated Informed Consent has also been provided to the subject or legally authorized representative, and the original signed document shall be maintained in the research shadow chart. Element of Informed Consent Discussed Date Initials/ Person obtaining consent   A statement that the study involves research, an explanation of the purposes of the research and the expected duration of the subject's participation, a description of the procedures to be followed, and identification of any procedures which are experimental 9/13/2023 VC   A description of any reasonably foreseeable risks or discomforts to the subject. 9/13/2023 VC   A description of any benefits to the subject or to others which may reasonably be expected from the research. 9/13/2023 VC   A disclosure of appropriate alternative procedures or courses of treatment, if any, that might be advantageous to the subject.  9/13/2023 VC   A statement describing the extent, if any, to which confidentiality of records identifying the subject will be maintained and that notes the possibility that the Food and Drug Administration may inspect the records 9/13/2023 VC   For research involving more than minimal risk, an explanation as to whether any compensation and an explanation as to whether any medical treatments are available if injury occurs and, if so, what they consist of, or where further information may be obtained. 9/13/2023 VC   An explanation of whom to contact for answers to pertinent questions about the research and research subjects' rights, and whom to contact in the event of a research-related injury to the subject. 9/13/2023 VC   A statement that participation is voluntary, that refusal to participate will involve no penalty or loss of benefits to which the subject is otherwise entitled, and that the subject may discontinue participation at any time without penalty or loss of benefits to which the subject is otherwise entitled. 9/13/2023 VC   A statement that the particular treatment or procedure may involve risks to the subject (or to the embryo or fetus, if the subject is or may become pregnant) which are currently unforeseeable 9/13/2023 VC   Anticipated circumstances under which the subject's participation may be terminated by the investigator without regard to the subject's consent. 9/13/2023 VC   Any additional costs to the subject that may result from participation in the research 9/13/2023 VC   The consequences of a subjects' decision to withdraw from the research and procedures for orderly termination of participation by the subject. 9/13/2023 VC   A statement that significant new findings developed during the course of the research which may relate to the subject's willingness to continue participation will be provided to the subject. 9/13/2023 VC   The approximate number of subjects involved in the study.  9/13/2023 VC       The patient speaks, reads and understands English?  xY q N     If NO, Name of :___________________________________      speaks, reads, and understands English?     q Y q N     Process utilized to obtain consent:_______________________________________________     Subject meets eligibility criteria as outline in the current protocol?   x Y q N    q N/A - Reason: ___________________________________________________________    The protocol consent was reviewed with the patient and all questions were addressed and answered?    Destiney harp N    The Informed Consent Teach-back section was reviewed with the subject and all questions and/or lack of understanding were addressed?    Destiney harp N    The subject agreed to participate and the consent was signed and dated?    Destiney harp N    Consent was obtained prior to any research procedures being performed?    Destiney harp N    Date & Time ICF signed _9/13/2023 12 pm______________________________      Were any recruitment materials or advertisements used/discussed with the subject?   q Y xN    **If “yes” file a copy with the signed consent form and provide a copy to the subject**

## 2023-09-13 NOTE — PATIENT INSTRUCTIONS
Informed consent for FOLFOX every 2 weeks X 8. Blood work 1 or 2 days prior to chemotherapy every 2 weeks. Patient has instructions about hydration, oral hygiene, avoid touching anything cold or drinking anything cold, Zofran for nausea and vomiting and Imodium for diarrhea. Has instructions about febrile neutropenia and bleeding. Not to take Advil. May take Tylenol. Prescription for tramadol. Chemotherapy to start after port insertion and Port-A-Cath is scheduled for next week Monday. Chemotherapy will be at [address]. Follow-up visit in 5 weeks.

## 2023-09-13 NOTE — PROGRESS NOTES
Consultation - Medical Oncology   Akash Flor 66 y.o. male MRN: [id number]  Unit/Bed#:  Encounter: [id number]  Referring physician: Dr. Sunny Francis  Date of service: 9/13/2023  Reason for Consult: Low rectal cancer  HPI: Akash Flor is a 66y.o. year old male. Patient is here with his wife. For the last 3-4 months there has been narrowness of the stool column. He has lost 20 pounds in the last 3-4 months. Posterior rectal cancer biopsy there was slight bleeding. Rectal pain. History of diabetes mellitus, hyperlipidemia and depression/anxiety. 60 pack history of smoking. Patient quit smoking cigarettes 12 years ago. No alcohol. No family history of cancer. Patient had a colonoscopy in August 2023 and this was his first colonoscopy and that showed anterior based fungating fixed rectal tumor at the dentate line. Additional benign polyps. A. Colon, random colon biopsy:  - Fragments of colonic mucosa with increased intraepithelial lymphocytes and focal surface epithelial damage.  - There is no increase in subepithelial collagen thickness. See note.     Note (A): The above morphologic features may be compatible with lymphocytic (microscopic) colitis in conjunction with appropriate clinical and endoscopic findings.       B. Large Intestine, Transverse Colon, hot snare transverse colon polyp:  - Tubular adenoma, fragments. - Negative for high grade dysplasia/ carcinoma.     C. Large Intestine, Left/Descending Colon, hot snare descending colon polyp:  - Benign colonic mucosa without significant microscopic abnormality.     D. Large Intestine, Sigmoid Colon, hot snare mid sigmoid polyp:  - Tubular adenoma. - Negative for high grade dysplasia/ carcinoma.     E. Large Intestine, Sigmoid Colon, distal sigmoid polyps- hot snare  x1 / cold snare  2:  - Tubular adenoma x 2, negative for high grade dysplasia/ carcinoma. - Hyperplastic polyp, negative for dysplasia/ carcinoma.      F.  Rectum, biopsy rectal mass:  - Adenocarcinoma, superficial fragments. See note. - MMR panel is pending.     Note (F): This case was reviewed at the intradepartmental  conference.    Dr. Semaj Brown is notified of the diagnosis in [employer] on 2023  at 1.30 pm.   Electronically signed by Gaby Ugarte MD on 2023 at  1:41 PM     .Antibody          Clone               Description                           Results  MLH1               M1                   Mismatch repair protein       Intact nuclear expression  NLY5              [id number]       Mismatch repair protein       Intact nuclear expression  QGI7              79                     Mismatch repair protein       Intact nuclear expression  KEE5              [id number]           Mismatch repair protein       Intact nuclear expression     Historical Information   Past Medical History:   Diagnosis Date   • Diabetes mellitus ([address])    • Hyperlipidemia      Past Surgical History:   Procedure Laterality Date   • GALLBLADDER SURGERY     • TONSILLECTOMY     • UMBILICAL HERNIA REPAIR       Social History   Social History     Substance and Sexual Activity   Alcohol Use Never     Social History     Substance and Sexual Activity   Drug Use Never     Social History     Tobacco Use   Smoking Status Former   • Packs/day: 2.00   • Years: 30.00   • Total pack years: 60.00   • Types: Cigarettes   • Quit date:    • Years since quittin.7   • Passive exposure: Past   Smokeless Tobacco Never     Family History:   Family History   Problem Relation Age of Onset   • No Known Problems Mother    • No Known Problems Father          Current Outpatient Medications:   •  buPROPion (WELLBUTRIN XL) 300 mg 24 hr tablet, Take 300 mg by mouth daily, Disp: , Rfl:   •  escitalopram (LEXAPRO) 20 mg tablet, Take 20 mg by mouth daily, Disp: , Rfl:   •  ibuprofen (MOTRIN) 200 mg tablet, Take 600 mg by mouth every 6 (six) hours, Disp: , Rfl:   •  lisinopril (ZESTRIL) 5 mg tablet, Take 5 mg by mouth daily, Disp: , Rfl:   •  metFORMIN (GLUCOPHAGE) 1000 MG tablet, Take 1,000 mg by mouth 2 (two) times a day, Disp: , Rfl:   •  rosuvastatin (CRESTOR) 20 MG tablet, Take 20 mg by mouth daily, Disp: , Rfl:   •  traMADol (Ultram) 50 mg tablet, Take 1 tablet (50 mg total) by mouth every 4 (four) hours as needed for moderate pain, Disp: 40 tablet, Rfl: 0    Allergies   Allergen Reactions   • Atorvastatin Myalgia   ROS:  09/13/23 Reviewed 12 systems: See symptoms in HPI  Presently no other neurological, cardiac, pulmonary, GI and  symptoms other than listed in HPI. Other symptoms are in HPI. No  fever, chills,  bone pains, skin rash,  night sweats, arthritic symptoms,  tiredness , weakness, numbness, claudication and gait problem. No frequent infections. Not unusually sensitive to heat or cold. No swelling of the ankles. No swollen glands. Patient is anxious. Physical Exam:  Vitals:    09/13/23 0939   BP: 118/76   BP Location: Left arm   Patient Position: Sitting   Cuff Size: Adult   Pulse: 75   Resp: 16   Temp: 98.8 °F (37.1 °C)   TempSrc: Temporal   SpO2: 98%   Weight: 96.6 kg (213 lb)   Height: 6' (1.829 m)     Alert, oriented, not in distress, vitals are above, no icterus, no oral thrush, no palpable neck mass, clear lung fields, regular heart rate, abdomen  soft and non tender, no palpable abdominal mass, no ascites, no edema of ankles, no calf tenderness, no focal neurological deficit, no skin rash, no palpable lymphadenopathy in the neck and axillary areas,  no clubbing. Patient is anxious. Performance status 0 . Lab Results: I have reviewed all pertinent labs.   LABS:  Results for orders placed or performed in visit on 09/05/23   Comprehensive metabolic panel   Result Value Ref Range    Glucose, Random 153 (H) 70 - 99 mg/dL    BUN 19 6 - 24 mg/dL    Creatinine 0.94 0.76 - 1.27 mg/dL    eGFR 95 >59 mL/min/1.73    SL AMB BUN/CREATININE RATIO 20 9 - 20    Sodium 141 134 - 144 mmol/L Potassium 4.9 3.5 - 5.2 mmol/L    Chloride 103 96 - 106 mmol/L    CO2 22 20 - 29 mmol/L    CALCIUM 9.9 8.7 - 10.2 mg/dL    Protein, Total 6.8 6.0 - 8.5 g/dL    Albumin 4.7 3.8 - 4.9 g/dL    Globulin, Total 2.1 1.5 - 4.5 g/dL    Albumin/Globulin Ratio 2.2 1.2 - 2.2    TOTAL BILIRUBIN 0.8 0.0 - 1.2 mg/dL    Alk Phos Isoenzymes 84 44 - 121 IU/L    AST 19 0 - 40 IU/L    ALT 35 0 - 44 IU/L   CBC   Result Value Ref Range    White Blood Cell Count 7.7 3.4 - 10.8 x10E3/uL    Red Blood Cell Count 4.98 4.14 - 5.80 x10E6/uL    Hemoglobin 16.0 13.0 - 17.7 g/dL    HCT 47.0 37.5 - 51.0 %    MCV 94 79 - 97 fL    MCH 32.1 26.6 - 33.0 pg    MCHC 34.0 31.5 - 35.7 g/dL    RDW 12.5 11.6 - 15.4 %    Platelet Count 675 079 - 450 x10E3/uL   CEA   Result Value Ref Range    CEA 36.2 (H) 0.0 - 4.7 ng/mL         Imaging Studies: I have personally reviewed pertinent reports. IMPRESSION:  Unenhanced MRI of the Pelvis (Rectal Protocol)     Low rectal cancer, 3.5 cm from the anal verge, 6.3 cm long, centered along the anterior rectal wall. Anteriorly, the tumor invades the right apex of the prostate (series 5 images 26-27.)     Overall MRI stage: T4b, N1, Low rectal cancer  MRF: Not applicable for T4 tumor. Sphincter involvement: Yes. There is internal sphincter invasion in the posterior half of the upper internal sphincter (series 8 images 14-15, and series 2 image 13.)  Suspicious extra mesorectal lymph nodes: There is a suspicious superior rectal chain 9 mm node (series 4 image 13 and series 7 image 13.)  EMVI: No     For the purpose of radiation therapy planning, series 5 and 7 would be most useful.  (Series 5 is a small field of view axial T2 weighted sequence typically most useful for radiation therapy planning, however the superior rectal chain node was not   included on this sequence, and is better seen on series 7.)                 Workstation performed: [id number]        Imaging    MRI pelvis rectal cancer staging wo contrast (Order: [id number]) - 9/11/2023  IMPRESSION:     No evidence of metastatic disease in the chest, abdomen, and pelvis.     Please see subsequent scheduled rectal cancer protocol pelvic MRI for evaluation of local tumor staging.                 Workstation performed: [id number]        Imaging    CT chest abdomen pelvis w contrast (Order: [id number]) - 9/7/2023    Pathology, and Other Studies: I have personally reviewed pertinent reports. See above    Assessment and Plan:  See diagnoses, orders instructions below    For the last 3-4 months there has been narrowness of the stool column. He has lost 20 pounds in the last 3-4 months. Posterior rectal cancer biopsy there was slight bleeding. Rectal pain. History of diabetes mellitus, hyperlipidemia and depression/anxiety. Physical examination and test results are as recorded and discussed. Discussed patient characteristics. Discussed cancer characteristics. Discussed T4 b N1 low ductal cancer involving the sphincter, locally advanced disease. No distant metastatic disease. CEA 36. Discussed total neoadjuvant therapy, FOLFOX followed by infusion 5-FU plus radiation followed by surgery. Discussed all this in very much detail. Patient is already scheduled to have Port-A-Cath. Chemotherapy to start after that. Discussed FOLFOX medications with the patient and his wife in detail and provided printed and verbal information and patient signed informed consent. Diet and activities as tolerated. Later on health screening test.  Patient is capable of self-care. Goal is cure from rectal cancer if possible. 1. Rectal adenocarcinoma ([address])    - Ambulatory Referral to Hematology / Oncology  - CBC and differential; Standing  - Comprehensive metabolic panel; Standing  - CEA; Standing  - CBC and differential  - Comprehensive metabolic panel  - CEA    2. Regional lymph node metastasis present ([address])      3. Abnormal tumor markers      4.  History of diabetes mellitus      5. Dyslipidemia      6. Chemotherapy-induced nausea      7. Chemotherapy induced diarrhea      8. Chemotherapy induced neutropenia (HCC)      9. Cancer related pain-patient has been taking a lot of Advil for rectal pain and he has requested pain medication. He should not be taking NSAIDs while on chemotherapy. Patient will accept tramadol.    - traMADol (Ultram) 50 mg tablet; Take 1 tablet (50 mg total) by mouth every 4 (four) hours as needed for moderate pain  Dispense: 40 tablet; Refill: 0    Informed consent for FOLFOX every 2 weeks X 8. Blood work 1 or 2 days prior to chemotherapy every 2 weeks. Patient has instructions about hydration, oral hygiene, avoid touching anything cold or drinking anything cold, Zofran for nausea and vomiting and Imodium for diarrhea. Has instructions about febrile neutropenia and bleeding. Not to take Advil. May take Tylenol. Prescription for tramadol. Chemotherapy to start after port insertion and Port-A-Cath is scheduled for next week Monday. Chemotherapy will be at [address]. Follow-up visit in 5 weeks. Patient will continue to follow with  primary physician and other consultants. Patient voiced understanding and agrees      Disclaimer: This document was prepared using dictation device. If a word or phrase is confusing, or does not make sense, this is likely due to recognition error which was not discovered during the providers review. If you believe an error has occurred, please Contact me through [employer] service for tiara? cation. Counseling / Coordination of Care  . Kendrick Lennox   Provided counseling and support

## 2023-09-13 NOTE — PROGRESS NOTES
Pt was identified as being eligible for the [employer] 2021-05 study. Pt was seen by Dr. Carlyn Smith at the [facility] on 9/13/2023 for his rectal cancer. Myself and [facility] Niels Jordan met with pt, who was accompanied by his wife, to discuss the trial, including the risks, benefits, and alternatives. Pt and spouse had the opportunity to ask questions, and all were answered to their satisfaction. The pt was agreeable to consent and signed the consent form along with the [place]. Blood was drawn at 10:43 AM by ALICAI Davis. The CRC provided a copy of the signed consent along with their business card should the pt have any questions.

## 2023-09-14 ENCOUNTER — PATIENT OUTREACH (OUTPATIENT)
Dept: CASE MANAGEMENT | Facility: HOSPITAL | Age: 58
End: 2023-09-14

## 2023-09-14 NOTE — PROGRESS NOTES
OSW completed chart review. Patient saw Dr. Hilliard Has yesterday, 9/13/23. Patient will have port scheduled for 9/18/23. Patient will have chemo after. LSW will follow for assessment and DT.

## 2023-09-18 ENCOUNTER — DOCUMENTATION (OUTPATIENT)
Dept: HEMATOLOGY ONCOLOGY | Facility: CLINIC | Age: 58
End: 2023-09-18

## 2023-09-18 ENCOUNTER — HOSPITAL ENCOUNTER (OUTPATIENT)
Dept: INTERVENTIONAL RADIOLOGY/VASCULAR | Facility: HOSPITAL | Age: 58
Discharge: HOME | End: 2023-09-18
Attending: INTERNAL MEDICINE | Admitting: INTERNAL MEDICINE
Payer: COMMERCIAL

## 2023-09-18 VITALS
SYSTOLIC BLOOD PRESSURE: 128 MMHG | HEART RATE: 70 BPM | DIASTOLIC BLOOD PRESSURE: 72 MMHG | TEMPERATURE: 98.2 F | OXYGEN SATURATION: 94 % | RESPIRATION RATE: 18 BRPM

## 2023-09-18 DIAGNOSIS — C20 ADENOCARCINOMA OF RECTUM (HCC): ICD-10-CM

## 2023-09-18 LAB
GLUCOSE SERPL-MCNC: 141 MG/DL (ref 65–140)
INR PPP: 0.88 (ref 0.84–1.19)
PROTHROMBIN TIME: 12.6 SECONDS (ref 11.6–14.5)

## 2023-09-18 PROCEDURE — 77001 FLUOROGUIDE FOR VEIN DEVICE: CPT

## 2023-09-18 PROCEDURE — 36561 INSERT TUNNELED CV CATH: CPT

## 2023-09-18 PROCEDURE — C1788 PORT, INDWELLING, IMP: HCPCS

## 2023-09-18 PROCEDURE — 76937 US GUIDE VASCULAR ACCESS: CPT

## 2023-09-18 PROCEDURE — C1894 INTRO/SHEATH, NON-LASER: HCPCS

## 2023-09-18 PROCEDURE — 77001 FLUOROGUIDE FOR VEIN DEVICE: CPT | Performed by: INTERNAL MEDICINE

## 2023-09-18 PROCEDURE — 76937 US GUIDE VASCULAR ACCESS: CPT | Performed by: INTERNAL MEDICINE

## 2023-09-18 PROCEDURE — 85610 PROTHROMBIN TIME: CPT | Performed by: INTERNAL MEDICINE

## 2023-09-18 PROCEDURE — 82948 REAGENT STRIP/BLOOD GLUCOSE: CPT

## 2023-09-18 PROCEDURE — 99152 MOD SED SAME PHYS/QHP 5/>YRS: CPT

## 2023-09-18 PROCEDURE — 99152 MOD SED SAME PHYS/QHP 5/>YRS: CPT | Performed by: INTERNAL MEDICINE

## 2023-09-18 PROCEDURE — 36561 INSERT TUNNELED CV CATH: CPT | Performed by: INTERNAL MEDICINE

## 2023-09-18 RX ORDER — MIDAZOLAM HYDROCHLORIDE 2 MG/2ML
INJECTION, SOLUTION INTRAMUSCULAR; INTRAVENOUS AS NEEDED
Status: COMPLETED | OUTPATIENT
Start: 2023-09-18 | End: 2023-09-18

## 2023-09-18 RX ORDER — LIDOCAINE HYDROCHLORIDE AND EPINEPHRINE 10; 10 MG/ML; UG/ML
INJECTION, SOLUTION INFILTRATION; PERINEURAL AS NEEDED
Status: COMPLETED | OUTPATIENT
Start: 2023-09-18 | End: 2023-09-18

## 2023-09-18 RX ORDER — FENTANYL CITRATE 50 UG/ML
INJECTION, SOLUTION INTRAMUSCULAR; INTRAVENOUS AS NEEDED
Status: COMPLETED | OUTPATIENT
Start: 2023-09-18 | End: 2023-09-18

## 2023-09-18 RX ORDER — SODIUM CHLORIDE 9 MG/ML
30 INJECTION, SOLUTION INTRAVENOUS CONTINUOUS
Status: DISCONTINUED | OUTPATIENT
Start: 2023-09-18 | End: 2023-09-19 | Stop reason: HOSPADM

## 2023-09-18 RX ORDER — CEFAZOLIN SODIUM 2 G/50ML
2000 SOLUTION INTRAVENOUS ONCE
Status: COMPLETED | OUTPATIENT
Start: 2023-09-18 | End: 2023-09-18

## 2023-09-18 RX ADMIN — MIDAZOLAM 2 MG: 1 INJECTION INTRAMUSCULAR; INTRAVENOUS at 11:16

## 2023-09-18 RX ADMIN — CEFAZOLIN SODIUM 2000 MG: 2 SOLUTION INTRAVENOUS at 11:05

## 2023-09-18 RX ADMIN — MIDAZOLAM 2 MG: 1 INJECTION INTRAMUSCULAR; INTRAVENOUS at 11:24

## 2023-09-18 RX ADMIN — FENTANYL CITRATE 100 MCG: 50 INJECTION, SOLUTION INTRAMUSCULAR; INTRAVENOUS at 11:16

## 2023-09-18 RX ADMIN — LIDOCAINE HYDROCHLORIDE,EPINEPHRINE BITARTRATE 16 ML: 10; .01 INJECTION, SOLUTION INFILTRATION; PERINEURAL at 11:16

## 2023-09-18 NOTE — BRIEF OP NOTE (RAD/CATH)
INTERVENTIONAL RADIOLOGY PROCEDURE NOTE    Date: 9/18/2023    Procedure:   Procedure Summary     Date: 09/18/23 Room / Location: [address] [facility] Interventional Radiology    Anesthesia Start:  Anesthesia Stop:     Procedure: IR PORT PLACEMENT Diagnosis:       Adenocarcinoma of rectum ([address])      (neoplasm of rectum)    Scheduled Providers:  Responsible Provider:     Anesthesia Type: Not recorded ASA Status: Not recorded          Preoperative diagnosis:   1. Adenocarcinoma of rectum (HCC)         Postoperative diagnosis: Same. Surgeon: Joseph Richards MD     Assistant: None. No qualified resident was available. Blood loss: 5 mL    Specimens: None     Findings: Placement of a right-sided chest port. The port may be used immediately. Please see the radiology report for details. Complications: None immediate.     Anesthesia: conscious sedation and local

## 2023-09-18 NOTE — DISCHARGE INSTRUCTIONS
Implanted Venous Access Port     WHAT YOU NEED TO KNOW:   An implanted venous access port is a device used to give treatments and take blood. It may also be called a central venous access device (CVAD). The port is a small container that is placed under your skin, usually in your upper chest. The port is attached to a catheter that enters a large vein. DISCHARGE INSTRUCTIONS:   Resume your normal diet. Small sips of flat soda will help with mild nausea. Prevent an infection:   Wash your hands often. Use soap and water. Clean your hands before and after you care for your port. Remind everyone who cares for your port to wash their hands. Check your skin for infection every day. Look for redness, swelling, or fluid oozing from the port site. Care for your port:   1. You may shower beginning 48 hours after procedure. 2.  Leave glue in place. 3. It is normal for some bruising to occur. 4. Use Tylenol for pain. 5. Limit use of arm on the side that your port was placed. Lift nothing heavier than 5 pounds for 1 week, and then gradually increase activity as tolerated. 6. DO NOT apply ointment, lotion or cream to port site until incision is healed. Allow glue to fall off. DO NOT attempt to peel glue from skin even it it begins to flake. 7. After the port incision is healed you may swim, bathe. Notify the Interventional Radiologist if you have any of the followin. Fever above 101 F    2. Increased redness or swelling after 1st day. 3. Increased pain after 1st day. 4. Any sign of infection (drainage from port site, skin separation, hot to touch). 5. Persistent nausea or vomiting. Contact Interventional Radiology at [phone] [facility] PATIENTS: Contact Interventional Radiology at [phone]) ([address] [facility] 281: Contact Interventional Radiology at [phone]).

## 2023-09-18 NOTE — PROGRESS NOTES
/RECTAL/GI MULTIDISCIPLINARY CASE REVIEW  NEW RECTAL CANCER DIAGNOSIS    DATE: 9/14/2023      PRESENTING DOCTOR: Dr. Brittney Moreno      DIAGNOSIS: Rectal cancer, T4b, N1       Jad Buck was presented at the Rectal/GI Multidisciplinary Conference today. BIOPSY DATE/RESULT:  8/22/2023-  Rectum, biopsy rectal mass:  - Adenocarcinoma, superficial fragments. See note. - MMR panel is pending. IMAGING DONE:  9/7/2023- CT CAP  9/11/2023- MRI pelvis rectal cancer staging    WAS IMAGING REVIEWED TODAY:  YES    CEA RESULT/DATE/REVIEWED:  9/5/2023- 36.2- Reviewed    EXPECTED DATE OF FIRST TREATMENT:  9/25/2023    COLONOSCOPY REVIEWED:  YES    IF METASTATIC, WERE BIOPSIES OF METASTATIC SITES AVAILABLE FOR REVIEW:  N/A    PRE TREATMENT CLINICAL STAGE:  T4b, N1     PHYSICIAN RECOMMENDED PLAN:    -Total Neoadjuvant Treatment-  -Chemotherapy (FOLFOX) x4 months.  -Concurrent chemo (5FU)/ RT x6 weeks. -Re-imaging with CT CAP and MRI pelvis for rectal cancer staging.  -Visit with surgeon to discuss/evaluate for surgery. Anticipated surgical procedure possible Low Anterior Resection (LAR) vs Abdominoperineal Resection (APR). Team agreed to plan. The final treatment plan will be left to the discretion of the patient and the treating physician. DISCLAIMERS:  TO THE TREATING PHYSICIAN:  This conference is a meeting of clinicians from various specialty areas who evaluate and discuss patients for whom a multidisciplinary treatment approach is being considered. Please note that the above opinion was a consensus of the conference attendees and is intended only to assist in quality care of the discussed patient. The responsibility for follow up on the input given during the conference, along with any final decisions regarding plan of care, is that of the patient and the patient's provider. Accordingly, appointments have only been recommended based on this information and have NOT been scheduled unless otherwise noted. TO THE PATIENT:  This summary is a brief record of major aspects of your cancer treatment. You may choose to share a copy with any of your doctors or nurses. However, this is not a detailed or comprehensive record of your care.       NCCN guidelines were readily available for review at this discussion

## 2023-09-18 NOTE — INTERVAL H&P NOTE
Update: (This section must be completed if the H&P was completed greater than 24 hrs to procedure or admission)    H&P reviewed. After examining the patient, I find no changed to the H&P since it had been written. /78   Pulse 73   Temp 97.6 °F (36.4 °C) (Temporal)   Resp 18   SpO2 97%     Patient re-evaluated. Accept as history and physical.    We will proceed with port placement today.     Yue Cabral MD/September 18, 2023/10:55 AM

## 2023-09-18 NOTE — SEDATION DOCUMENTATION
Port placed. Patient tolerated well. AAOx3 and stable for transfer to PACU. Report and care given to primary RN.

## 2023-09-19 RX ORDER — FLUOROURACIL 50 MG/ML
400 INJECTION, SOLUTION INTRAVENOUS ONCE
Status: CANCELLED | OUTPATIENT
Start: 2023-09-25

## 2023-09-19 RX ORDER — DEXTROSE MONOHYDRATE 50 MG/ML
20 INJECTION, SOLUTION INTRAVENOUS ONCE
Status: CANCELLED | OUTPATIENT
Start: 2023-09-25

## 2023-09-19 RX ORDER — SODIUM CHLORIDE 9 MG/ML
20 INJECTION, SOLUTION INTRAVENOUS ONCE AS NEEDED
Status: CANCELLED | OUTPATIENT
Start: 2023-09-25

## 2023-09-20 ENCOUNTER — PATIENT OUTREACH (OUTPATIENT)
Dept: HEMATOLOGY ONCOLOGY | Facility: CLINIC | Age: 58
End: 2023-09-20

## 2023-09-20 DIAGNOSIS — C20 RECTAL ADENOCARCINOMA (HCC): Primary | ICD-10-CM

## 2023-09-20 DIAGNOSIS — Z95.828 PORT-A-CATH IN PLACE: ICD-10-CM

## 2023-09-20 RX ORDER — LIDOCAINE AND PRILOCAINE 25; 25 MG/G; MG/G
CREAM TOPICAL AS NEEDED
Qty: 1 G | Refills: 1 | Status: SHIPPED | OUTPATIENT
Start: 2023-09-20

## 2023-09-20 NOTE — PROGRESS NOTES
Spoke with pts wife Jared Barney, she stated he has an appt at [facility] to get his labs done Friday prior to starting treatment. As of now he will be having his labs done at the lab, unless he decides to have it done at the infusion center. They live about 45 minutes away from the infusion center. He does not need assistance with transportation at this time. She asked about the lidocaine cream to put on the port site prior to treatment. Message sent to [address] [place] [address] to have it sent to the pharmacy. She is aware of all his upcoming appointments.  She was thankful for the call, and has myself an [facility] contact information if they need anything

## 2023-09-22 ENCOUNTER — TELEPHONE (OUTPATIENT)
Dept: INFUSION CENTER | Facility: HOSPITAL | Age: 58
End: 2023-09-22

## 2023-09-22 ENCOUNTER — DOCUMENTATION (OUTPATIENT)
Dept: HEMATOLOGY ONCOLOGY | Facility: CLINIC | Age: 58
End: 2023-09-22

## 2023-09-22 DIAGNOSIS — C20 RECTAL ADENOCARCINOMA (HCC): Primary | ICD-10-CM

## 2023-09-23 LAB
ALBUMIN SERPL-MCNC: 4.5 G/DL (ref 3.8–4.9)
ALBUMIN/GLOB SERPL: 2 {RATIO} (ref 1.2–2.2)
ALP SERPL-CCNC: 97 IU/L (ref 44–121)
ALT SERPL-CCNC: 29 IU/L (ref 0–44)
AST SERPL-CCNC: 17 IU/L (ref 0–40)
BASOPHILS # BLD AUTO: 0.1 X10E3/UL (ref 0–0.2)
BASOPHILS NFR BLD AUTO: 1 %
BILIRUB SERPL-MCNC: 1.1 MG/DL (ref 0–1.2)
BUN SERPL-MCNC: 16 MG/DL (ref 6–24)
BUN/CREAT SERPL: 17 (ref 9–20)
CALCIUM SERPL-MCNC: 9.9 MG/DL (ref 8.7–10.2)
CEA SERPL-MCNC: 33.4 NG/ML (ref 0–4.7)
CHLORIDE SERPL-SCNC: 102 MMOL/L (ref 96–106)
CO2 SERPL-SCNC: 22 MMOL/L (ref 20–29)
CREAT SERPL-MCNC: 0.95 MG/DL (ref 0.76–1.27)
EGFR: 94 ML/MIN/1.73
EOSINOPHIL # BLD AUTO: 0.2 X10E3/UL (ref 0–0.4)
EOSINOPHIL NFR BLD AUTO: 2 %
ERYTHROCYTE [DISTWIDTH] IN BLOOD BY AUTOMATED COUNT: 12.6 % (ref 11.6–15.4)
GLOBULIN SER-MCNC: 2.2 G/DL (ref 1.5–4.5)
GLUCOSE SERPL-MCNC: 146 MG/DL (ref 70–99)
HCT VFR BLD AUTO: 47.2 % (ref 37.5–51)
HGB BLD-MCNC: 15.9 G/DL (ref 13–17.7)
IMM GRANULOCYTES # BLD: 0 X10E3/UL (ref 0–0.1)
IMM GRANULOCYTES NFR BLD: 0 %
LYMPHOCYTES # BLD AUTO: 2.3 X10E3/UL (ref 0.7–3.1)
LYMPHOCYTES NFR BLD AUTO: 31 %
MCH RBC QN AUTO: 32.3 PG (ref 26.6–33)
MCHC RBC AUTO-ENTMCNC: 33.7 G/DL (ref 31.5–35.7)
MCV RBC AUTO: 96 FL (ref 79–97)
MONOCYTES # BLD AUTO: 0.6 X10E3/UL (ref 0.1–0.9)
MONOCYTES NFR BLD AUTO: 9 %
NEUTROPHILS # BLD AUTO: 4.2 X10E3/UL (ref 1.4–7)
NEUTROPHILS NFR BLD AUTO: 57 %
PLATELET # BLD AUTO: 248 X10E3/UL (ref 150–450)
POTASSIUM SERPL-SCNC: 4.9 MMOL/L (ref 3.5–5.2)
PROT SERPL-MCNC: 6.7 G/DL (ref 6–8.5)
RBC # BLD AUTO: 4.92 X10E6/UL (ref 4.14–5.8)
SODIUM SERPL-SCNC: 142 MMOL/L (ref 134–144)
WBC # BLD AUTO: 7.4 X10E3/UL (ref 3.4–10.8)

## 2023-09-25 ENCOUNTER — PATIENT OUTREACH (OUTPATIENT)
Dept: HEMATOLOGY ONCOLOGY | Facility: CLINIC | Age: 58
End: 2023-09-25

## 2023-09-25 ENCOUNTER — HOSPITAL ENCOUNTER (OUTPATIENT)
Dept: INFUSION CENTER | Facility: HOSPITAL | Age: 58
Discharge: HOME | End: 2023-09-25
Attending: INTERNAL MEDICINE
Payer: COMMERCIAL

## 2023-09-25 VITALS
TEMPERATURE: 97 F | WEIGHT: 208.56 LBS | BODY MASS INDEX: 28.25 KG/M2 | DIASTOLIC BLOOD PRESSURE: 92 MMHG | SYSTOLIC BLOOD PRESSURE: 136 MMHG | HEIGHT: 72 IN | OXYGEN SATURATION: 97 % | RESPIRATION RATE: 16 BRPM | HEART RATE: 74 BPM

## 2023-09-25 DIAGNOSIS — C20 RECTAL ADENOCARCINOMA (HCC): Primary | ICD-10-CM

## 2023-09-25 PROCEDURE — 96368 THER/DIAG CONCURRENT INF: CPT

## 2023-09-25 PROCEDURE — 96415 CHEMO IV INFUSION ADDL HR: CPT

## 2023-09-25 PROCEDURE — 96411 CHEMO IV PUSH ADDL DRUG: CPT

## 2023-09-25 PROCEDURE — 96413 CHEMO IV INFUSION 1 HR: CPT

## 2023-09-25 PROCEDURE — 96367 TX/PROPH/DG ADDL SEQ IV INF: CPT

## 2023-09-25 PROCEDURE — G0498 CHEMO EXTEND IV INFUS W/PUMP: HCPCS

## 2023-09-25 RX ORDER — DEXTROSE MONOHYDRATE 50 MG/ML
20 INJECTION, SOLUTION INTRAVENOUS ONCE
Status: COMPLETED | OUTPATIENT
Start: 2023-09-25 | End: 2023-09-25

## 2023-09-25 RX ORDER — SODIUM CHLORIDE 9 MG/ML
20 INJECTION, SOLUTION INTRAVENOUS ONCE AS NEEDED
Status: DISCONTINUED | OUTPATIENT
Start: 2023-09-25 | End: 2023-09-28 | Stop reason: HOSPADM

## 2023-09-25 RX ORDER — FLUOROURACIL 50 MG/ML
400 INJECTION, SOLUTION INTRAVENOUS ONCE
Status: COMPLETED | OUTPATIENT
Start: 2023-09-25 | End: 2023-09-25

## 2023-09-25 RX ADMIN — LEUCOVORIN CALCIUM 900 MG: 500 INJECTION, POWDER, LYOPHILIZED, FOR SOLUTION INTRAMUSCULAR; INTRAVENOUS at 09:11

## 2023-09-25 RX ADMIN — OXALIPLATIN 186.15 MG: 5 INJECTION, SOLUTION INTRAVENOUS at 09:12

## 2023-09-25 RX ADMIN — SODIUM CHLORIDE 20 ML/HR: 9 INJECTION, SOLUTION INTRAVENOUS at 08:30

## 2023-09-25 RX ADMIN — DEXTROSE 20 ML/HR: 5 SOLUTION INTRAVENOUS at 09:02

## 2023-09-25 RX ADMIN — FLUOROURACIL 875 MG: 50 INJECTION, SOLUTION INTRAVENOUS at 11:22

## 2023-09-25 RX ADMIN — DEXAMETHASONE SODIUM PHOSPHATE: 10 INJECTION, SOLUTION INTRAMUSCULAR; INTRAVENOUS at 08:34

## 2023-09-25 NOTE — PROGRESS NOTES
Biopsychosocial and Barriers Assessment    Cancer Diagnosis: Rectal adenocarcinoma   Home/Cell Phone: [phone]  Emergency Contact: Gilmer Gayle, wife, [phone]  Marital Status:   Interpretation concerns, speaks another language, preferred language: English  Cultural concerns: None reported  Ability to read or write: Fully Literate    Caregiver/Support: Spouse, family  Children: No  Child/Elder care: no    Housing: Own home  Home Setup: 2 story  Lives With: spouse  Daily Living Activities: Pt is able to care for herself  Durable Medical Equipment: None  Ambulation: Pt ambulates independently    [facility]  High co-pays with insurance: No  High co-pays with medication coverage: No  No medication coverage: Pt has coverage through his [employer]    Primary Care Provider: Ebenezer Salas MD  Hx of [address]: No  Hx of Short term rehab: No  Mental Health Hx: None reported  Substance Abuse Hx: No  Employment: No  [place] Status/Location:  Ability to pay bills: Yes  POA/LW/AD:  Transportation Plan/Concerns: No, both pt and his wife drive      What do you know about your Cancer Diagnosis    What has your doctor told you about your cancer diagnosis: Pt is fully aware of his diagnosis. What has your doctor told you about your cancer treatment: Pt will have chemo and if needed, surgery. What specific concerns do you have about your diagnosis and treatment: No concerns expressed. Have you been made aware of any hair loss associated with treatment: N/A    Additional Comments:    MSW met with the pt and his wife in the infusion center this day as this was the pt's first day of treatment. They were open to conversation and the role of the OSW was described to them. Contact information was provided. 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 His wife works remotely.   They do not express any financial concerns and at this time, they do not reports any issues with their insurance. The pt and his wife do not express any social work needs at this time. MSW encouraged them to call if their needs should change. Emotional support offered.

## 2023-09-25 NOTE — PROGRESS NOTES
Patient chemo infusion completed without complication. First time education given, patient and wife verbalized understanding. Elastomeric infusion pump connected per protocol, double checked and unclamped by second RN. Patient aware of disconnect time and appointment- 9/27/23 at 10:00am. Patient given AVS at this time and was discharged in stable condition to home via ambulation.

## 2023-09-27 ENCOUNTER — PATIENT OUTREACH (OUTPATIENT)
Dept: HEMATOLOGY ONCOLOGY | Facility: CLINIC | Age: 58
End: 2023-09-27

## 2023-09-27 ENCOUNTER — HOSPITAL ENCOUNTER (OUTPATIENT)
Dept: INFUSION CENTER | Facility: HOSPITAL | Age: 58
Discharge: HOME | End: 2023-09-27
Attending: INTERNAL MEDICINE

## 2023-09-27 VITALS — TEMPERATURE: 97.7 F

## 2023-09-27 DIAGNOSIS — C20 RECTAL ADENOCARCINOMA (HCC): Primary | ICD-10-CM

## 2023-09-27 NOTE — PROGRESS NOTES
Pt here for CADD d/c. C/o that chemo has "knocked me on my butt!!"  C/o fatigue, slight nausea. Elastomere appears empty. Port de-accessed, dsd applied. Pt has more appts, disch amb to home, steady gait.

## 2023-09-27 NOTE — PROGRESS NOTES
Rcvd email from pt's wife. "Hi again. Sorry to bother you. Just had a voicemail (waiting for another call back) for a radiation oncologist from [facility]. So he will have two separate oncologists?"      My reply:  "Ana Lilia Chavez! Yes that is correct, I placed the referral to radiation oncology to get him scheduled to meet with a radiation oncologist. This will not be until around cycle 6 of his current chemo schedule. Then after meeting with the radiation oncologist, they will get him prepared to transition to starting the combination therapy- chemo and radiation. I hope this was helpful!   Ronna"

## 2023-10-02 RX ORDER — SODIUM CHLORIDE 9 MG/ML
20 INJECTION, SOLUTION INTRAVENOUS ONCE AS NEEDED
OUTPATIENT
Start: 2023-10-09

## 2023-10-02 RX ORDER — DEXTROSE MONOHYDRATE 50 MG/ML
20 INJECTION, SOLUTION INTRAVENOUS ONCE
OUTPATIENT
Start: 2023-10-09

## 2023-10-02 RX ORDER — FLUOROURACIL 50 MG/ML
400 INJECTION, SOLUTION INTRAVENOUS ONCE
OUTPATIENT
Start: 2023-10-09

## 2023-10-05 DIAGNOSIS — C20 RECTAL ADENOCARCINOMA (HCC): Primary | ICD-10-CM

## 2023-10-05 LAB
ALBUMIN SERPL-MCNC: 4.2 G/DL (ref 3.8–4.9)
ALBUMIN/GLOB SERPL: 2.2 {RATIO} (ref 1.2–2.2)
ALP SERPL-CCNC: 78 IU/L (ref 44–121)
ALT SERPL-CCNC: 32 IU/L (ref 0–44)
AST SERPL-CCNC: 17 IU/L (ref 0–40)
BASOPHILS # BLD AUTO: 0 X10E3/UL (ref 0–0.2)
BASOPHILS NFR BLD AUTO: 1 %
BILIRUB SERPL-MCNC: 0.4 MG/DL (ref 0–1.2)
BUN SERPL-MCNC: 20 MG/DL (ref 6–24)
BUN/CREAT SERPL: 22 (ref 9–20)
CALCIUM SERPL-MCNC: 9.4 MG/DL (ref 8.7–10.2)
CHLORIDE SERPL-SCNC: 105 MMOL/L (ref 96–106)
CO2 SERPL-SCNC: 23 MMOL/L (ref 20–29)
CREAT SERPL-MCNC: 0.93 MG/DL (ref 0.76–1.27)
EGFR: 96 ML/MIN/1.73
EOSINOPHIL # BLD AUTO: 0.2 X10E3/UL (ref 0–0.4)
EOSINOPHIL NFR BLD AUTO: 3 %
ERYTHROCYTE [DISTWIDTH] IN BLOOD BY AUTOMATED COUNT: 12.6 % (ref 11.6–15.4)
GLOBULIN SER-MCNC: 1.9 G/DL (ref 1.5–4.5)
GLUCOSE SERPL-MCNC: 143 MG/DL (ref 70–99)
HCT VFR BLD AUTO: 40 % (ref 37.5–51)
HGB BLD-MCNC: 14 G/DL (ref 13–17.7)
IMM GRANULOCYTES # BLD: 0 X10E3/UL (ref 0–0.1)
IMM GRANULOCYTES NFR BLD: 0 %
LYMPHOCYTES # BLD AUTO: 2.2 X10E3/UL (ref 0.7–3.1)
LYMPHOCYTES NFR BLD AUTO: 40 %
MCH RBC QN AUTO: 33.2 PG (ref 26.6–33)
MCHC RBC AUTO-ENTMCNC: 35 G/DL (ref 31.5–35.7)
MCV RBC AUTO: 95 FL (ref 79–97)
MONOCYTES # BLD AUTO: 0.6 X10E3/UL (ref 0.1–0.9)
MONOCYTES NFR BLD AUTO: 10 %
NEUTROPHILS # BLD AUTO: 2.6 X10E3/UL (ref 1.4–7)
NEUTROPHILS NFR BLD AUTO: 46 %
PLATELET # BLD AUTO: 229 X10E3/UL (ref 150–450)
POTASSIUM SERPL-SCNC: 4.6 MMOL/L (ref 3.5–5.2)
PROT SERPL-MCNC: 6.1 G/DL (ref 6–8.5)
RBC # BLD AUTO: 4.22 X10E6/UL (ref 4.14–5.8)
SODIUM SERPL-SCNC: 141 MMOL/L (ref 134–144)
WBC # BLD AUTO: 5.6 X10E3/UL (ref 3.4–10.8)

## 2023-10-09 ENCOUNTER — HOSPITAL ENCOUNTER (OUTPATIENT)
Dept: INFUSION CENTER | Facility: HOSPITAL | Age: 58
Discharge: HOME | End: 2023-10-09
Attending: INTERNAL MEDICINE
Payer: COMMERCIAL

## 2023-10-09 VITALS
HEIGHT: 72 IN | HEART RATE: 77 BPM | BODY MASS INDEX: 28.16 KG/M2 | WEIGHT: 207.89 LBS | DIASTOLIC BLOOD PRESSURE: 72 MMHG | SYSTOLIC BLOOD PRESSURE: 123 MMHG | RESPIRATION RATE: 16 BRPM | TEMPERATURE: 97.5 F

## 2023-10-09 DIAGNOSIS — C20 RECTAL ADENOCARCINOMA (HCC): Primary | ICD-10-CM

## 2023-10-09 PROCEDURE — 96368 THER/DIAG CONCURRENT INF: CPT

## 2023-10-09 PROCEDURE — G0498 CHEMO EXTEND IV INFUS W/PUMP: HCPCS

## 2023-10-09 PROCEDURE — 96413 CHEMO IV INFUSION 1 HR: CPT

## 2023-10-09 PROCEDURE — 96367 TX/PROPH/DG ADDL SEQ IV INF: CPT

## 2023-10-09 PROCEDURE — 96411 CHEMO IV PUSH ADDL DRUG: CPT

## 2023-10-09 PROCEDURE — 96415 CHEMO IV INFUSION ADDL HR: CPT

## 2023-10-09 RX ORDER — FLUOROURACIL 50 MG/ML
400 INJECTION, SOLUTION INTRAVENOUS ONCE
Status: COMPLETED | OUTPATIENT
Start: 2023-10-09 | End: 2023-10-09

## 2023-10-09 RX ORDER — SODIUM CHLORIDE 9 MG/ML
20 INJECTION, SOLUTION INTRAVENOUS ONCE AS NEEDED
Status: DISCONTINUED | OUTPATIENT
Start: 2023-10-09 | End: 2023-10-12 | Stop reason: HOSPADM

## 2023-10-09 RX ORDER — DEXTROSE MONOHYDRATE 50 MG/ML
20 INJECTION, SOLUTION INTRAVENOUS ONCE
Status: COMPLETED | OUTPATIENT
Start: 2023-10-09 | End: 2023-10-09

## 2023-10-09 RX ADMIN — FLUOROURACIL 875 MG: 50 INJECTION, SOLUTION INTRAVENOUS at 11:28

## 2023-10-09 RX ADMIN — SODIUM CHLORIDE 20 ML/HR: 0.9 INJECTION, SOLUTION INTRAVENOUS at 08:35

## 2023-10-09 RX ADMIN — OXALIPLATIN 186.15 MG: 5 INJECTION, SOLUTION INTRAVENOUS at 09:14

## 2023-10-09 RX ADMIN — DEXTROSE 20 ML/HR: 5 SOLUTION INTRAVENOUS at 09:03

## 2023-10-09 RX ADMIN — DEXAMETHASONE SODIUM PHOSPHATE: 10 INJECTION, SOLUTION INTRAMUSCULAR; INTRAVENOUS at 08:35

## 2023-10-09 RX ADMIN — LEUCOVORIN CALCIUM 900 MG: 500 INJECTION, POWDER, LYOPHILIZED, FOR SOLUTION INTRAMUSCULAR; INTRAVENOUS at 09:11

## 2023-10-09 NOTE — PROGRESS NOTES
Patient completed chemotherapy infusion with no adverse reactions. Elastomeric pump infusing, all connections and clamps taped appropriately and verified by 2nd RN. Aware of next appointment, declined AVS. Left unit ambulatory with steady gait.

## 2023-10-10 ENCOUNTER — TELEPHONE (OUTPATIENT)
Dept: HEMATOLOGY ONCOLOGY | Facility: CLINIC | Age: 58
End: 2023-10-10

## 2023-10-10 DIAGNOSIS — C20 RECTAL ADENOCARCINOMA (HCC): Primary | ICD-10-CM

## 2023-10-11 ENCOUNTER — HOSPITAL ENCOUNTER (OUTPATIENT)
Dept: INFUSION CENTER | Facility: HOSPITAL | Age: 58
Discharge: HOME | End: 2023-10-11
Attending: INTERNAL MEDICINE

## 2023-10-11 ENCOUNTER — TELEPHONE (OUTPATIENT)
Dept: HEMATOLOGY ONCOLOGY | Facility: CLINIC | Age: 58
End: 2023-10-11

## 2023-10-11 DIAGNOSIS — R11.0 CHEMOTHERAPY-INDUCED NAUSEA: Primary | ICD-10-CM

## 2023-10-11 DIAGNOSIS — C20 RECTAL ADENOCARCINOMA (HCC): Primary | ICD-10-CM

## 2023-10-11 DIAGNOSIS — T45.1X5A CHEMOTHERAPY-INDUCED NAUSEA: Primary | ICD-10-CM

## 2023-10-11 RX ORDER — PROCHLORPERAZINE MALEATE 10 MG
10 TABLET ORAL EVERY 6 HOURS PRN
Qty: 30 TABLET | Refills: 1 | Status: SHIPPED | OUTPATIENT
Start: 2023-10-11 | End: 2023-10-17 | Stop reason: ALTCHOICE

## 2023-10-11 NOTE — PROGRESS NOTES
Pt arrived on unit for elastomeric d/c. Evert Phillips appears fully deflated, disconnected after 46 hours. Pt reported no complications. Pump d/c'd. Port checked for blood return, flushed and de-accessed. Pt ambulated with a steady gait.  Declined AVS

## 2023-10-11 NOTE — PROGRESS NOTES
Pt stated his nausea is worse than the last cycle; he is not able to eat and drink enough; he is using his zofran; notified Gale Barnett RN/ Dr. Deborah Martínez who stated she will send in a script for another nausea med; relayed this info to pt and wife who verb understanding; pt left unit amb with steady gait.

## 2023-10-11 NOTE — TELEPHONE ENCOUNTER
I spoke with patients wife Melani Perez. Notified her that we will be sending a Rx for Compazine to patients pharmacy. He can alternate this with his Zofran.   He should call if this does not offer relief

## 2023-10-17 ENCOUNTER — OFFICE VISIT (OUTPATIENT)
Dept: HEMATOLOGY ONCOLOGY | Facility: CLINIC | Age: 58
End: 2023-10-17
Payer: COMMERCIAL

## 2023-10-17 VITALS
DIASTOLIC BLOOD PRESSURE: 60 MMHG | TEMPERATURE: 98.1 F | WEIGHT: 207 LBS | HEART RATE: 88 BPM | HEIGHT: 72 IN | SYSTOLIC BLOOD PRESSURE: 118 MMHG | BODY MASS INDEX: 28.04 KG/M2 | OXYGEN SATURATION: 98 % | RESPIRATION RATE: 16 BRPM

## 2023-10-17 DIAGNOSIS — T45.1X5A CHEMOTHERAPY-INDUCED NAUSEA: ICD-10-CM

## 2023-10-17 DIAGNOSIS — C20 RECTAL ADENOCARCINOMA (HCC): Primary | ICD-10-CM

## 2023-10-17 DIAGNOSIS — G89.3 CANCER RELATED PAIN: ICD-10-CM

## 2023-10-17 DIAGNOSIS — C77.9 REGIONAL LYMPH NODE METASTASIS PRESENT (HCC): ICD-10-CM

## 2023-10-17 DIAGNOSIS — R11.0 CHEMOTHERAPY-INDUCED NAUSEA: ICD-10-CM

## 2023-10-17 DIAGNOSIS — G47.00 INSOMNIA, UNSPECIFIED TYPE: ICD-10-CM

## 2023-10-17 PROCEDURE — 99214 OFFICE O/P EST MOD 30 MIN: CPT | Performed by: INTERNAL MEDICINE

## 2023-10-17 RX ORDER — FLUOROURACIL 50 MG/ML
400 INJECTION, SOLUTION INTRAVENOUS ONCE
OUTPATIENT
Start: 2023-10-23

## 2023-10-17 RX ORDER — SODIUM CHLORIDE 9 MG/ML
20 INJECTION, SOLUTION INTRAVENOUS ONCE AS NEEDED
OUTPATIENT
Start: 2023-10-23

## 2023-10-17 RX ORDER — TRAMADOL HYDROCHLORIDE 50 MG/1
50 TABLET ORAL EVERY 4 HOURS PRN
Qty: 120 TABLET | Refills: 0 | Status: SHIPPED | OUTPATIENT
Start: 2023-10-17

## 2023-10-17 RX ORDER — ONDANSETRON HYDROCHLORIDE 8 MG/1
TABLET, FILM COATED ORAL
Qty: 20 TABLET | Refills: 1 | Status: SHIPPED | OUTPATIENT
Start: 2023-10-17

## 2023-10-17 RX ORDER — DEXTROSE MONOHYDRATE 50 MG/ML
20 INJECTION, SOLUTION INTRAVENOUS ONCE
OUTPATIENT
Start: 2023-10-23

## 2023-10-17 NOTE — PATIENT INSTRUCTIONS
Your zofran dose was increased to 8mg but frequency has been changed to every 8 hrs as needed for nausea. Don't take compazine unless we advise you to. Tramadol has been prescribed for you to help with the rectal pain. Please try taking Tylenol PM (with benadryl dose not to exceed 25mg on a daily basis) to see if it would help with your sleep. Please return to office in about 6 weeks for routine follow-up.

## 2023-10-17 NOTE — PROGRESS NOTES
Medical Oncology Office Follow-up Note    Suzette Meyers 66 y.o. male MRN: [id number]  Unit/Bed#:  Encounter: [id number]    10/17/23    Reason for Initial Consult: Low rectal cancer    HPI: Suzette Meyers is a 66y.o. year old male with underlying diabetes, hyperlipidemia, and depression with stage IIIC low rectal adenoCA in August 2023. Patient had presented with 3-4 months of change in stool caliber (narrow) and unintentional weight loss of 20 lbs. Social history notable for 60 pack years of smoking (dianne about 12 years ago); denied any EtOH use or family history of colon cancer. Patient had a colonoscopy in August 2023 and this was his first colonoscopy and that showed anterior based fungating fixed rectal tumor at the dentate line. Additional benign polyps. Patient saw Dr. Scarlett Saint for initial consultation on 9/13/2023, during which he was recommended treatment with IDALIA; FOLFOX followed by infusion 5-FU plus radiation, followed by evaluation for surgical candidacy. Interval History:     Patient presented to the office for a routine follow-up visit today. He is status post 2 treatments with modified FOLFOX regimen thus far. He has been tolerating treatment fairly well, however nausea was worse with cycle 2 and zofran wasn't helping much. By the time he picked up Compazine from pharmacy, nausea had resolved. Additionally, patient feels very fatigued for about 1 week following treatment. He has been having trouble falling asleep and staying asleep since being started on chemotherapy. He has tried melatonin with little to no improvement of sleep. He tried Tylenol PM, however this ended up making him very drowsy in the morning. Denied any actual episodes of vomiting. His weight has remained stable. He continues to work as a , although he takes off during his week of treatment.      Historical Information   Past Medical History:   Diagnosis Date    Diabetes mellitus ([address])     Hyperlipidemia Past Surgical History:   Procedure Laterality Date    GALLBLADDER SURGERY      IR PORT PLACEMENT  2023    TONSILLECTOMY      UMBILICAL HERNIA REPAIR       Social History   Social History     Substance and Sexual Activity   Alcohol Use Never     Social History     Substance and Sexual Activity   Drug Use Never     Social History     Tobacco Use   Smoking Status Former    Packs/day: 2.00    Years: 30.00    Total pack years: 60.00    Types: Cigarettes    Quit date:     Years since quittin.8    Passive exposure: Past   Smokeless Tobacco Never     Family History:   Family History   Problem Relation Age of Onset    No Known Problems Mother     No Known Problems Father          Current Outpatient Medications:     buPROPion (WELLBUTRIN XL) 300 mg 24 hr tablet, Take 300 mg by mouth daily, Disp: , Rfl:     escitalopram (LEXAPRO) 20 mg tablet, Take 20 mg by mouth daily, Disp: , Rfl:     ibuprofen (MOTRIN) 200 mg tablet, Take 600 mg by mouth every 6 (six) hours, Disp: , Rfl:     lidocaine-prilocaine (EMLA) cream, Apply topically as needed for mild pain, Disp: 1 g, Rfl: 1    lisinopril (ZESTRIL) 5 mg tablet, Take 5 mg by mouth daily, Disp: , Rfl:     metFORMIN (GLUCOPHAGE) 1000 MG tablet, Take 1,000 mg by mouth 2 (two) times a day, Disp: , Rfl:     ondansetron (ZOFRAN) 8 mg tablet, Take one tablet by mouth every 8 hours an needed for nausea, Disp: 20 tablet, Rfl: 1    rosuvastatin (CRESTOR) 20 MG tablet, Take 20 mg by mouth daily, Disp: , Rfl:     traMADol (Ultram) 50 mg tablet, Take 1 tablet (50 mg total) by mouth every 4 (four) hours as needed for moderate pain, Disp: 120 tablet, Rfl: 0    Allergies   Allergen Reactions    Atorvastatin Myalgia     Review of Systems   Constitutional:  Positive for fatigue (for about 1 week following treatment). Negative for appetite change, chills, fever and unexpected weight change.    HENT:   Negative for hearing loss, mouth sores, nosebleeds, sore throat, trouble swallowing and voice change. Eyes:  Negative for eye problems and icterus. Respiratory:  Negative for chest tightness, cough, shortness of breath and wheezing. Cardiovascular:  Negative for chest pain and palpitations. Gastrointestinal:  Negative for abdominal distention, abdominal pain, blood in stool, constipation, diarrhea, nausea and vomiting. Endocrine: Negative for hot flashes. Genitourinary:  Negative for difficulty urinating, dyspareunia, dysuria, frequency and hematuria. Musculoskeletal:  Negative for back pain, flank pain, gait problem, myalgias and neck pain. Skin:  Negative for itching and rash. Neurological:  Positive for numbness (sensitivity to cold objects/food). Negative for dizziness, gait problem, headaches, light-headedness and speech difficulty. Hematological:  Negative for adenopathy. Does not bruise/bleed easily. Psychiatric/Behavioral:  Positive for sleep disturbance (only 4-5 hrs of sleep per night since starting treatment). Negative for confusion. The patient is not nervous/anxious. Vitals:    10/17/23 0942   BP: 118/60   BP Location: Left arm   Patient Position: Sitting   Cuff Size: Adult   Pulse: 88   Resp: 16   Temp: 98.1 °F (36.7 °C)   TempSrc: Temporal   SpO2: 98%   Weight: 93.9 kg (207 lb)   Height: 6' 0.01" (1.829 m)       Physical Exam  Vitals reviewed. Constitutional:       General: He is not in acute distress. Appearance: He is not ill-appearing, toxic-appearing or diaphoretic. HENT:      Head: Normocephalic and atraumatic. Mouth/Throat:      Mouth: Mucous membranes are moist.      Pharynx: No oropharyngeal exudate or posterior oropharyngeal erythema. Eyes:      General: No scleral icterus. Conjunctiva/sclera: Conjunctivae normal.      Pupils: Pupils are equal, round, and reactive to light. Cardiovascular:      Rate and Rhythm: Normal rate and regular rhythm. Heart sounds: Normal heart sounds. No murmur heard.      No gallop. Pulmonary:      Effort: No respiratory distress. Breath sounds: Normal breath sounds. No stridor. No wheezing or rales. Abdominal:      General: Bowel sounds are normal. There is no distension. Palpations: Abdomen is soft. Tenderness: There is no abdominal tenderness. There is no guarding. Musculoskeletal:         General: No swelling or deformity. Right lower leg: No edema. Skin:     Capillary Refill: Capillary refill takes less than 2 seconds. Coloration: Skin is not jaundiced or pale. Findings: No bruising, erythema, lesion or rash. Neurological:      General: No focal deficit present. Mental Status: He is alert and oriented to person, place, and time. Motor: No weakness. Psychiatric:         Mood and Affect: Mood normal.         Behavior: Behavior normal.         Thought Content: Thought content normal.         All relevant labs and imaging results were reviewed and discussed with the patient. Pathology results:      A. Colon, random colon biopsy:  - Fragments of colonic mucosa with increased intraepithelial lymphocytes and focal surface epithelial damage.  - There is no increase in subepithelial collagen thickness. See note. Note (A): The above morphologic features may be compatible with lymphocytic (microscopic) colitis in conjunction with appropriate clinical and endoscopic findings. B. Large Intestine, Transverse Colon, hot snare transverse colon polyp:  - Tubular adenoma, fragments. - Negative for high grade dysplasia/ carcinoma. C. Large Intestine, Left/Descending Colon, hot snare descending colon polyp:  - Benign colonic mucosa without significant microscopic abnormality. D. Large Intestine, Sigmoid Colon, hot snare mid sigmoid polyp:  - Tubular adenoma. - Negative for high grade dysplasia/ carcinoma.      E. Large Intestine, Sigmoid Colon, distal sigmoid polyps- hot snare  x1 / cold snare  2:  - Tubular adenoma x 2, negative for high grade dysplasia/ carcinoma. - Hyperplastic polyp, negative for dysplasia/ carcinoma. F. Rectum, biopsy rectal mass:  - Adenocarcinoma, superficial fragments. See note. - MMR panel is pending. Note (F): This case was reviewed at the intradepartmental  conference. Dr. Sharlene Munugia is notified of the diagnosis in [facility] via [facility] on 08/25/2023  at 1.30 pm.   Electronically signed by Viola Goldsmith MD on 8/25/2023 at  1:41 PM     .Antibody          Clone               Description                           Results  MLH1               M1                   Mismatch repair protein       Intact nuclear expression  MSH2              [id number]       Mismatch repair protein       Intact nuclear expression  MSH6              40                     Mismatch repair protein       Intact nuclear expression  PMS2              [id number]           Mismatch repair protein       Intact nuclear expression    Assessment and Plan:      1. Rectal adenocarcinoma ([address])    2. Regional lymph node metastasis present [facility])    Patient was diagnosed with stage IIIC low rectal adenoCA in August 2023. Patient had presented with 3-4 months of change in stool caliber (narrow) and unintentional weight loss of 20 lbs. Social history notable for 60 pack years of smoking (dianne about 12 years ago); denied any EtOH use or family history of colon cancer. Patient had a colonoscopy in August 2023 and this was his first colonoscopy and that showed anterior based fungating fixed rectal tumor at the dentate line. Additional benign polyps. Patient saw Dr. Twan King for initial consultation on 9/13/2023, during which he was recommended treatment with IDALIA; FOLFOX followed by infusion 5-FU plus radiation, followed by evaluation for surgical candidacy. Patient is status post 2 treatments with modified FOLFOX regimen thus far.  He has been tolerating treatment fairly well, although he reports experiencing worsening fatigue and nausea during the treatment weeks and new onset insomnia since being started on treatment. Recommendations for these have been made as specified below. We will continue with same dose of FOLFOX regimen every 2 weeks for now. Patient was advised to follow-up in office in about 6 weeks. He already has an appt scheduled with Dr. Kevin Mills from rad/onc team in December once he is ready for the concurrent chemoRT portion. 3. Cancer related pain    Patient was experiencing rectal pain constantly, for which he started taking tramadol every 4 hours on a scheduled basis, which has been helping with the pain significantly. Tramadol prescription refill has been ordered. - traMADol (Ultram) 50 mg tablet; Take 1 tablet (50 mg total) by mouth every 4 (four) hours as needed for moderate pain  Dispense: 120 tablet; Refill: 0    4. Chemotherapy-induced nausea    Patient was experiencing more nausea following second treatment, refractory to use of Zofran 4 mg. Zofran 8 mg every 8 hours as needed has been prescribed. Patient and his wife know to call the office and inform us if nausea is refractory to this higher dose of Zofran. Compazine prescription has been discontinued due to potential for developing extrapyramidal symptoms with combination of zofran and compazine. - ondansetron (ZOFRAN) 8 mg tablet; Take one tablet by mouth every 8 hours an needed for nausea  Dispense: 20 tablet; Refill: 1    5. Insomnia    Patient has been experiencing trouble falling asleep and staying asleep since being started on treatment. He tried melatonin, however this did not improve his sleep. He tried Tylenol PM, however this ended up making him very drowsy in the morning. Patient was recommended to try half of the tylenol PM (not to exceed 25mg of Benadryl each night). Patient and his wife are in agreement with the plan as noted above. They need to call the office with any questions or concerns.

## 2023-10-18 DIAGNOSIS — C20 RECTAL ADENOCARCINOMA (HCC): Primary | ICD-10-CM

## 2023-10-19 LAB
BASOPHILS # BLD AUTO: 0.1 X10E3/UL (ref 0–0.2)
BASOPHILS NFR BLD AUTO: 1 %
EOSINOPHIL # BLD AUTO: 0.2 X10E3/UL (ref 0–0.4)
EOSINOPHIL NFR BLD AUTO: 3 %
ERYTHROCYTE [DISTWIDTH] IN BLOOD BY AUTOMATED COUNT: 13.6 % (ref 11.6–15.4)
HCT VFR BLD AUTO: 44.5 % (ref 37.5–51)
HGB BLD-MCNC: 14.6 G/DL (ref 13–17.7)
IMM GRANULOCYTES # BLD: 0.1 X10E3/UL (ref 0–0.1)
IMM GRANULOCYTES NFR BLD: 1 %
LYMPHOCYTES # BLD AUTO: 2.8 X10E3/UL (ref 0.7–3.1)
LYMPHOCYTES NFR BLD AUTO: 47 %
MCH RBC QN AUTO: 32.5 PG (ref 26.6–33)
MCHC RBC AUTO-ENTMCNC: 32.8 G/DL (ref 31.5–35.7)
MCV RBC AUTO: 99 FL (ref 79–97)
MONOCYTES # BLD AUTO: 0.7 X10E3/UL (ref 0.1–0.9)
MONOCYTES NFR BLD AUTO: 12 %
NEUTROPHILS # BLD AUTO: 2.2 X10E3/UL (ref 1.4–7)
NEUTROPHILS NFR BLD AUTO: 36 %
PLATELET # BLD AUTO: 212 X10E3/UL (ref 150–450)
RBC # BLD AUTO: 4.49 X10E6/UL (ref 4.14–5.8)
WBC # BLD AUTO: 5.9 X10E3/UL (ref 3.4–10.8)

## 2023-10-20 LAB
ALBUMIN SERPL-MCNC: 4.3 G/DL (ref 3.8–4.9)
ALBUMIN/GLOB SERPL: 2.2 {RATIO} (ref 1.2–2.2)
ALP SERPL-CCNC: 79 IU/L (ref 44–121)
ALT SERPL-CCNC: 34 IU/L (ref 0–44)
AST SERPL-CCNC: 25 IU/L (ref 0–40)
BILIRUB SERPL-MCNC: 0.7 MG/DL (ref 0–1.2)
BUN SERPL-MCNC: 16 MG/DL (ref 6–24)
BUN/CREAT SERPL: 17 (ref 9–20)
CALCIUM SERPL-MCNC: 9.4 MG/DL (ref 8.7–10.2)
CHLORIDE SERPL-SCNC: 102 MMOL/L (ref 96–106)
CO2 SERPL-SCNC: 23 MMOL/L (ref 20–29)
CREAT SERPL-MCNC: 0.95 MG/DL (ref 0.76–1.27)
EGFR: 94 ML/MIN/1.73
GLOBULIN SER-MCNC: 2 G/DL (ref 1.5–4.5)
GLUCOSE SERPL-MCNC: 143 MG/DL (ref 70–99)
POTASSIUM SERPL-SCNC: 4.7 MMOL/L (ref 3.5–5.2)
PROT SERPL-MCNC: 6.3 G/DL (ref 6–8.5)
SODIUM SERPL-SCNC: 141 MMOL/L (ref 134–144)

## 2023-10-23 ENCOUNTER — HOSPITAL ENCOUNTER (OUTPATIENT)
Dept: INFUSION CENTER | Facility: HOSPITAL | Age: 58
Discharge: HOME | End: 2023-10-23
Attending: INTERNAL MEDICINE
Payer: COMMERCIAL

## 2023-10-23 VITALS
RESPIRATION RATE: 16 BRPM | HEIGHT: 72 IN | BODY MASS INDEX: 28.46 KG/M2 | SYSTOLIC BLOOD PRESSURE: 131 MMHG | HEART RATE: 77 BPM | WEIGHT: 210.1 LBS | TEMPERATURE: 96.9 F | OXYGEN SATURATION: 98 % | DIASTOLIC BLOOD PRESSURE: 80 MMHG

## 2023-10-23 DIAGNOSIS — C20 RECTAL ADENOCARCINOMA (HCC): Primary | ICD-10-CM

## 2023-10-23 PROCEDURE — 96411 CHEMO IV PUSH ADDL DRUG: CPT

## 2023-10-23 PROCEDURE — 96415 CHEMO IV INFUSION ADDL HR: CPT

## 2023-10-23 PROCEDURE — 96367 TX/PROPH/DG ADDL SEQ IV INF: CPT

## 2023-10-23 PROCEDURE — 96368 THER/DIAG CONCURRENT INF: CPT

## 2023-10-23 PROCEDURE — 96413 CHEMO IV INFUSION 1 HR: CPT

## 2023-10-23 RX ORDER — SODIUM CHLORIDE 9 MG/ML
20 INJECTION, SOLUTION INTRAVENOUS ONCE AS NEEDED
Status: DISCONTINUED | OUTPATIENT
Start: 2023-10-23 | End: 2023-10-26 | Stop reason: HOSPADM

## 2023-10-23 RX ORDER — DEXTROSE MONOHYDRATE 50 MG/ML
20 INJECTION, SOLUTION INTRAVENOUS ONCE
Status: COMPLETED | OUTPATIENT
Start: 2023-10-23 | End: 2023-10-23

## 2023-10-23 RX ORDER — FLUOROURACIL 50 MG/ML
400 INJECTION, SOLUTION INTRAVENOUS ONCE
Status: COMPLETED | OUTPATIENT
Start: 2023-10-23 | End: 2023-10-23

## 2023-10-23 RX ADMIN — SODIUM CHLORIDE 20 ML/HR: 0.9 INJECTION, SOLUTION INTRAVENOUS at 08:24

## 2023-10-23 RX ADMIN — OXALIPLATIN 186.15 MG: 5 INJECTION, SOLUTION INTRAVENOUS at 09:19

## 2023-10-23 RX ADMIN — LEUCOVORIN CALCIUM 900 MG: 200 INJECTION, POWDER, LYOPHILIZED, FOR SOLUTION INTRAMUSCULAR; INTRAVENOUS at 09:18

## 2023-10-23 RX ADMIN — FLUOROURACIL 875 MG: 50 INJECTION, SOLUTION INTRAVENOUS at 11:26

## 2023-10-23 RX ADMIN — DEXTROSE 20 ML/HR: 5 SOLUTION INTRAVENOUS at 08:50

## 2023-10-23 RX ADMIN — DEXAMETHASONE SODIUM PHOSPHATE: 10 INJECTION, SOLUTION INTRAMUSCULAR; INTRAVENOUS at 08:24

## 2023-10-23 NOTE — PROGRESS NOTES
Pt tolerated chemo treatment with no adverse reaction. Pt connected to Elastomeric pump. All connections and ports taped and secured. Clamp opened by second RN. Left unit ambulatory with steady gait.  Refused AVS.

## 2023-10-25 ENCOUNTER — HOSPITAL ENCOUNTER (OUTPATIENT)
Dept: INFUSION CENTER | Facility: HOSPITAL | Age: 58
Discharge: HOME | End: 2023-10-25
Attending: INTERNAL MEDICINE

## 2023-10-25 DIAGNOSIS — C20 RECTAL ADENOCARCINOMA (HCC): Primary | ICD-10-CM

## 2023-10-25 NOTE — PROGRESS NOTES
Pt here for pump d/c; pump d/c'd per protocol; pt aware of next appt and left unit ambulatory with steady gait.

## 2023-10-26 DIAGNOSIS — R11.0 CHEMOTHERAPY-INDUCED NAUSEA: Primary | ICD-10-CM

## 2023-10-26 DIAGNOSIS — T45.1X5A CHEMOTHERAPY-INDUCED NAUSEA: Primary | ICD-10-CM

## 2023-10-26 RX ORDER — PROCHLORPERAZINE MALEATE 10 MG
10 TABLET ORAL EVERY 6 HOURS PRN
Qty: 30 TABLET | Refills: 1 | Status: SHIPPED | OUTPATIENT
Start: 2023-10-26

## 2023-10-30 RX ORDER — FLUOROURACIL 50 MG/ML
400 INJECTION, SOLUTION INTRAVENOUS ONCE
OUTPATIENT
Start: 2023-11-06

## 2023-10-30 RX ORDER — DEXTROSE MONOHYDRATE 50 MG/ML
20 INJECTION, SOLUTION INTRAVENOUS ONCE
OUTPATIENT
Start: 2023-11-06

## 2023-10-30 RX ORDER — SODIUM CHLORIDE 9 MG/ML
20 INJECTION, SOLUTION INTRAVENOUS ONCE AS NEEDED
OUTPATIENT
Start: 2023-11-06

## 2023-10-31 DIAGNOSIS — C20 RECTAL ADENOCARCINOMA (HCC): Primary | ICD-10-CM

## 2023-11-02 LAB
BASOPHILS # BLD AUTO: 0 X10E3/UL (ref 0–0.2)
BASOPHILS NFR BLD AUTO: 1 %
EOSINOPHIL # BLD AUTO: 0.1 X10E3/UL (ref 0–0.4)
EOSINOPHIL NFR BLD AUTO: 2 %
ERYTHROCYTE [DISTWIDTH] IN BLOOD BY AUTOMATED COUNT: 14.4 % (ref 11.6–15.4)
HCT VFR BLD AUTO: 42.2 % (ref 37.5–51)
HGB BLD-MCNC: 14.5 G/DL (ref 13–17.7)
IMM GRANULOCYTES # BLD: 0.1 X10E3/UL (ref 0–0.1)
IMM GRANULOCYTES NFR BLD: 1 %
LYMPHOCYTES # BLD AUTO: 3 X10E3/UL (ref 0.7–3.1)
LYMPHOCYTES NFR BLD AUTO: 50 %
MCH RBC QN AUTO: 33.6 PG (ref 26.6–33)
MCHC RBC AUTO-ENTMCNC: 34.4 G/DL (ref 31.5–35.7)
MCV RBC AUTO: 98 FL (ref 79–97)
MONOCYTES # BLD AUTO: 0.7 X10E3/UL (ref 0.1–0.9)
MONOCYTES NFR BLD AUTO: 11 %
NEUTROPHILS # BLD AUTO: 2.1 X10E3/UL (ref 1.4–7)
NEUTROPHILS NFR BLD AUTO: 35 %
PLATELET # BLD AUTO: 185 X10E3/UL (ref 150–450)
RBC # BLD AUTO: 4.32 X10E6/UL (ref 4.14–5.8)
WBC # BLD AUTO: 6 X10E3/UL (ref 3.4–10.8)

## 2023-11-03 LAB
ALBUMIN SERPL-MCNC: 4.3 G/DL (ref 3.8–4.9)
ALBUMIN/GLOB SERPL: 2.5 {RATIO} (ref 1.2–2.2)
ALP SERPL-CCNC: 79 IU/L (ref 44–121)
ALT SERPL-CCNC: 42 IU/L (ref 0–44)
AST SERPL-CCNC: 20 IU/L (ref 0–40)
BILIRUB SERPL-MCNC: 0.5 MG/DL (ref 0–1.2)
BUN SERPL-MCNC: 16 MG/DL (ref 6–24)
BUN/CREAT SERPL: 18 (ref 9–20)
CALCIUM SERPL-MCNC: 9.1 MG/DL (ref 8.7–10.2)
CHLORIDE SERPL-SCNC: 104 MMOL/L (ref 96–106)
CO2 SERPL-SCNC: 23 MMOL/L (ref 20–29)
CREAT SERPL-MCNC: 0.88 MG/DL (ref 0.76–1.27)
EGFR: 101 ML/MIN/1.73
GLOBULIN SER-MCNC: 1.7 G/DL (ref 1.5–4.5)
GLUCOSE SERPL-MCNC: 177 MG/DL (ref 70–99)
POTASSIUM SERPL-SCNC: 4.6 MMOL/L (ref 3.5–5.2)
PROT SERPL-MCNC: 6 G/DL (ref 6–8.5)
SODIUM SERPL-SCNC: 140 MMOL/L (ref 134–144)

## 2023-11-06 ENCOUNTER — HOSPITAL ENCOUNTER (OUTPATIENT)
Dept: INFUSION CENTER | Facility: HOSPITAL | Age: 58
Discharge: HOME | End: 2023-11-06
Attending: INTERNAL MEDICINE
Payer: COMMERCIAL

## 2023-11-06 VITALS
OXYGEN SATURATION: 98 % | TEMPERATURE: 97.5 F | SYSTOLIC BLOOD PRESSURE: 133 MMHG | HEART RATE: 83 BPM | WEIGHT: 211.2 LBS | RESPIRATION RATE: 16 BRPM | HEIGHT: 72 IN | BODY MASS INDEX: 28.61 KG/M2 | DIASTOLIC BLOOD PRESSURE: 84 MMHG

## 2023-11-06 DIAGNOSIS — C20 RECTAL ADENOCARCINOMA (HCC): Primary | ICD-10-CM

## 2023-11-06 PROCEDURE — 96368 THER/DIAG CONCURRENT INF: CPT

## 2023-11-06 PROCEDURE — 96413 CHEMO IV INFUSION 1 HR: CPT

## 2023-11-06 PROCEDURE — 96415 CHEMO IV INFUSION ADDL HR: CPT

## 2023-11-06 PROCEDURE — 96367 TX/PROPH/DG ADDL SEQ IV INF: CPT

## 2023-11-06 PROCEDURE — G0498 CHEMO EXTEND IV INFUS W/PUMP: HCPCS

## 2023-11-06 PROCEDURE — 96411 CHEMO IV PUSH ADDL DRUG: CPT

## 2023-11-06 RX ORDER — SODIUM CHLORIDE 9 MG/ML
20 INJECTION, SOLUTION INTRAVENOUS ONCE AS NEEDED
Status: DISCONTINUED | OUTPATIENT
Start: 2023-11-06 | End: 2023-11-09 | Stop reason: HOSPADM

## 2023-11-06 RX ORDER — DEXTROSE MONOHYDRATE 50 MG/ML
20 INJECTION, SOLUTION INTRAVENOUS ONCE
Status: COMPLETED | OUTPATIENT
Start: 2023-11-06 | End: 2023-11-06

## 2023-11-06 RX ORDER — FLUOROURACIL 50 MG/ML
400 INJECTION, SOLUTION INTRAVENOUS ONCE
Status: COMPLETED | OUTPATIENT
Start: 2023-11-06 | End: 2023-11-06

## 2023-11-06 RX ADMIN — FLUOROURACIL 875 MG: 50 INJECTION, SOLUTION INTRAVENOUS at 11:16

## 2023-11-06 RX ADMIN — DEXAMETHASONE SODIUM PHOSPHATE: 10 INJECTION, SOLUTION INTRAMUSCULAR; INTRAVENOUS at 08:24

## 2023-11-06 RX ADMIN — OXALIPLATIN 186.15 MG: 5 INJECTION, SOLUTION, CONCENTRATE INTRAVENOUS at 08:59

## 2023-11-06 RX ADMIN — DEXTROSE 20 ML/HR: 5 SOLUTION INTRAVENOUS at 08:24

## 2023-11-06 RX ADMIN — LEUCOVORIN CALCIUM 900 MG: 200 INJECTION, POWDER, LYOPHILIZED, FOR SUSPENSION INTRAMUSCULAR; INTRAVENOUS at 08:59

## 2023-11-06 NOTE — PROGRESS NOTES
Pt tolerated tx without incident. Elastomeric pump connected per protocol, double checked and unclamped by second RN. Pt aware of disconnect time. AVS declined.

## 2023-11-07 ENCOUNTER — PATIENT OUTREACH (OUTPATIENT)
Dept: HEMATOLOGY ONCOLOGY | Facility: CLINIC | Age: 58
End: 2023-11-07

## 2023-11-07 NOTE — PROGRESS NOTES
Spoke with pts wife Jose Alfredo Conway. She stated overall Oren Flor is doing well, with no concerns at this time. We did discuss and confirm the scheduled radiation oncology consult. She is aware that the post treatment imaging will be scheduled once we have and end of treatment date.   She was thankful for the call, and has myself and NN Crystal contact information if she needs anything

## 2023-11-08 ENCOUNTER — HOSPITAL ENCOUNTER (OUTPATIENT)
Dept: INFUSION CENTER | Facility: HOSPITAL | Age: 58
Discharge: HOME | End: 2023-11-08
Attending: INTERNAL MEDICINE
Payer: COMMERCIAL

## 2023-11-08 VITALS — TEMPERATURE: 97.6 F | HEART RATE: 94 BPM | SYSTOLIC BLOOD PRESSURE: 128 MMHG | DIASTOLIC BLOOD PRESSURE: 84 MMHG

## 2023-11-08 DIAGNOSIS — C20 RECTAL ADENOCARCINOMA (HCC): ICD-10-CM

## 2023-11-08 DIAGNOSIS — T45.1X5A CHEMOTHERAPY INDUCED NEUTROPENIA: ICD-10-CM

## 2023-11-08 DIAGNOSIS — T45.1X5A CHEMOTHERAPY INDUCED DIARRHEA: ICD-10-CM

## 2023-11-08 DIAGNOSIS — R11.0 CHEMOTHERAPY-INDUCED NAUSEA: Primary | ICD-10-CM

## 2023-11-08 DIAGNOSIS — T45.1X5A CHEMOTHERAPY-INDUCED NAUSEA: Primary | ICD-10-CM

## 2023-11-08 DIAGNOSIS — D70.1 CHEMOTHERAPY INDUCED NEUTROPENIA: ICD-10-CM

## 2023-11-08 DIAGNOSIS — K52.1 CHEMOTHERAPY INDUCED DIARRHEA: ICD-10-CM

## 2023-11-08 PROCEDURE — 96361 HYDRATE IV INFUSION ADD-ON: CPT

## 2023-11-08 PROCEDURE — 96365 THER/PROPH/DIAG IV INF INIT: CPT

## 2023-11-08 RX ADMIN — SODIUM CHLORIDE 500 ML: 0.9 INJECTION, SOLUTION INTRAVENOUS at 10:02

## 2023-11-08 RX ADMIN — DEXAMETHASONE SODIUM PHOSPHATE: 10 INJECTION, SOLUTION INTRAMUSCULAR; INTRAVENOUS at 09:59

## 2023-11-08 NOTE — PROGRESS NOTES
Pt arrived amb for Elastomere d/c and hydration. C/o past 2 days of severe fatigue and nausea, which he takes Compazine for. Elastomere appears empty, over 46hrs has elapsed, removed, port flushed and NSS hydration given along with Zofran/Decadron PB. Hydration completed, pt geo well. Port de-accessed, dsd applied. Disch amb to home, steady gait. Pt has more appts.

## 2023-11-12 PROBLEM — K52.1 CHEMOTHERAPY INDUCED DIARRHEA: Status: RESOLVED | Noted: 2023-09-13 | Resolved: 2023-11-12

## 2023-11-12 PROBLEM — T45.1X5A CHEMOTHERAPY INDUCED DIARRHEA: Status: RESOLVED | Noted: 2023-09-13 | Resolved: 2023-11-12

## 2023-11-14 DIAGNOSIS — C20 RECTAL ADENOCARCINOMA (HCC): Primary | ICD-10-CM

## 2023-11-14 RX ORDER — FLUOROURACIL 50 MG/ML
400 INJECTION, SOLUTION INTRAVENOUS ONCE
Status: CANCELLED | OUTPATIENT
Start: 2023-11-20

## 2023-11-14 RX ORDER — DEXTROSE MONOHYDRATE 50 MG/ML
20 INJECTION, SOLUTION INTRAVENOUS ONCE
Status: CANCELLED | OUTPATIENT
Start: 2023-11-20

## 2023-11-14 RX ORDER — SODIUM CHLORIDE 9 MG/ML
20 INJECTION, SOLUTION INTRAVENOUS ONCE AS NEEDED
Status: CANCELLED | OUTPATIENT
Start: 2023-11-20

## 2023-11-20 ENCOUNTER — HOSPITAL ENCOUNTER (OUTPATIENT)
Dept: INFUSION CENTER | Facility: HOSPITAL | Age: 58
Discharge: HOME | End: 2023-11-20
Attending: INTERNAL MEDICINE
Payer: COMMERCIAL

## 2023-11-20 VITALS
OXYGEN SATURATION: 98 % | HEIGHT: 72 IN | BODY MASS INDEX: 28.55 KG/M2 | SYSTOLIC BLOOD PRESSURE: 127 MMHG | HEART RATE: 90 BPM | WEIGHT: 210.76 LBS | RESPIRATION RATE: 16 BRPM | DIASTOLIC BLOOD PRESSURE: 85 MMHG | TEMPERATURE: 96 F

## 2023-11-20 DIAGNOSIS — C20 RECTAL ADENOCARCINOMA (HCC): Primary | ICD-10-CM

## 2023-11-20 PROCEDURE — 96415 CHEMO IV INFUSION ADDL HR: CPT

## 2023-11-20 PROCEDURE — G0498 CHEMO EXTEND IV INFUS W/PUMP: HCPCS

## 2023-11-20 PROCEDURE — 96367 TX/PROPH/DG ADDL SEQ IV INF: CPT

## 2023-11-20 PROCEDURE — 96368 THER/DIAG CONCURRENT INF: CPT

## 2023-11-20 PROCEDURE — 96411 CHEMO IV PUSH ADDL DRUG: CPT

## 2023-11-20 PROCEDURE — 96413 CHEMO IV INFUSION 1 HR: CPT

## 2023-11-20 RX ORDER — FLUOROURACIL 50 MG/ML
400 INJECTION, SOLUTION INTRAVENOUS ONCE
Status: COMPLETED | OUTPATIENT
Start: 2023-11-20 | End: 2023-11-20

## 2023-11-20 RX ORDER — SODIUM CHLORIDE 9 MG/ML
20 INJECTION, SOLUTION INTRAVENOUS ONCE AS NEEDED
Status: DISCONTINUED | OUTPATIENT
Start: 2023-11-20 | End: 2023-11-23 | Stop reason: HOSPADM

## 2023-11-20 RX ORDER — DEXTROSE MONOHYDRATE 50 MG/ML
20 INJECTION, SOLUTION INTRAVENOUS ONCE
Status: COMPLETED | OUTPATIENT
Start: 2023-11-20 | End: 2023-11-20

## 2023-11-20 RX ADMIN — SODIUM CHLORIDE 20 ML/HR: 9 INJECTION, SOLUTION INTRAVENOUS at 08:34

## 2023-11-20 RX ADMIN — FLUOROURACIL 875 MG: 50 INJECTION, SOLUTION INTRAVENOUS at 11:14

## 2023-11-20 RX ADMIN — DEXTROSE 20 ML/HR: 5 SOLUTION INTRAVENOUS at 09:00

## 2023-11-20 RX ADMIN — LEUCOVORIN CALCIUM 900 MG: 200 INJECTION, POWDER, LYOPHILIZED, FOR SUSPENSION INTRAMUSCULAR; INTRAVENOUS at 09:08

## 2023-11-20 RX ADMIN — DEXAMETHASONE SODIUM PHOSPHATE: 100 INJECTION INTRAMUSCULAR; INTRAVENOUS at 08:35

## 2023-11-20 RX ADMIN — OXALIPLATIN 186.15 MG: 100 INJECTION, SOLUTION, CONCENTRATE INTRAVENOUS at 09:10

## 2023-11-22 ENCOUNTER — HOSPITAL ENCOUNTER (OUTPATIENT)
Dept: INFUSION CENTER | Facility: HOSPITAL | Age: 58
Discharge: HOME | End: 2023-11-22
Attending: INTERNAL MEDICINE

## 2023-11-22 ENCOUNTER — DOCUMENTATION (OUTPATIENT)
Dept: HEMATOLOGY ONCOLOGY | Facility: CLINIC | Age: 58
End: 2023-11-22

## 2023-11-22 DIAGNOSIS — C20 RECTAL ADENOCARCINOMA (HCC): Primary | ICD-10-CM

## 2023-11-22 NOTE — PROGRESS NOTES
Message sent to [facility] via TEAMS regarding pt refusal of zofran/dec and fluid bolus.

## 2023-11-22 NOTE — PROGRESS NOTES
Pt here for pump disconnect. Pt refused zofran/dec and fluid bolus. Educated pt of possible complications with tx refusal. No adverse reactions declined AVS and left ambulatory with steady gait.

## 2023-11-27 DIAGNOSIS — R11.0 CHEMOTHERAPY-INDUCED NAUSEA: ICD-10-CM

## 2023-11-27 DIAGNOSIS — G89.3 CANCER RELATED PAIN: ICD-10-CM

## 2023-11-27 DIAGNOSIS — T45.1X5A CHEMOTHERAPY-INDUCED NAUSEA: ICD-10-CM

## 2023-11-27 RX ORDER — PROCHLORPERAZINE MALEATE 10 MG
10 TABLET ORAL EVERY 6 HOURS PRN
Qty: 30 TABLET | Refills: 0 | Status: SHIPPED | OUTPATIENT
Start: 2023-11-27

## 2023-11-27 RX ORDER — DEXTROSE MONOHYDRATE 50 MG/ML
20 INJECTION, SOLUTION INTRAVENOUS ONCE
Status: CANCELLED | OUTPATIENT
Start: 2023-12-04

## 2023-11-27 RX ORDER — FLUOROURACIL 50 MG/ML
400 INJECTION, SOLUTION INTRAVENOUS ONCE
Status: CANCELLED | OUTPATIENT
Start: 2023-12-04

## 2023-11-27 RX ORDER — SODIUM CHLORIDE 9 MG/ML
20 INJECTION, SOLUTION INTRAVENOUS ONCE AS NEEDED
Status: CANCELLED | OUTPATIENT
Start: 2023-12-04

## 2023-11-27 RX ORDER — TRAMADOL HYDROCHLORIDE 50 MG/1
50 TABLET ORAL EVERY 4 HOURS PRN
Qty: 120 TABLET | Refills: 0 | Status: SHIPPED | OUTPATIENT
Start: 2023-11-27

## 2023-11-28 DIAGNOSIS — C20 RECTAL ADENOCARCINOMA (HCC): Primary | ICD-10-CM

## 2023-11-30 ENCOUNTER — TELEPHONE (OUTPATIENT)
Dept: HEMATOLOGY ONCOLOGY | Facility: CLINIC | Age: 58
End: 2023-11-30

## 2023-11-30 ENCOUNTER — OFFICE VISIT (OUTPATIENT)
Dept: HEMATOLOGY ONCOLOGY | Facility: CLINIC | Age: 58
End: 2023-11-30
Payer: COMMERCIAL

## 2023-11-30 VITALS
HEART RATE: 103 BPM | TEMPERATURE: 97.2 F | DIASTOLIC BLOOD PRESSURE: 80 MMHG | RESPIRATION RATE: 17 BRPM | WEIGHT: 208 LBS | HEIGHT: 72 IN | OXYGEN SATURATION: 97 % | BODY MASS INDEX: 28.17 KG/M2 | SYSTOLIC BLOOD PRESSURE: 130 MMHG

## 2023-11-30 DIAGNOSIS — R97.8 ABNORMAL TUMOR MARKERS: ICD-10-CM

## 2023-11-30 DIAGNOSIS — D70.1 CHEMOTHERAPY INDUCED NEUTROPENIA: ICD-10-CM

## 2023-11-30 DIAGNOSIS — T45.1X5A CHEMOTHERAPY INDUCED NEUTROPENIA: ICD-10-CM

## 2023-11-30 DIAGNOSIS — C77.9 REGIONAL LYMPH NODE METASTASIS PRESENT (HCC): ICD-10-CM

## 2023-11-30 DIAGNOSIS — C20 RECTAL ADENOCARCINOMA (HCC): Primary | ICD-10-CM

## 2023-11-30 DIAGNOSIS — T45.1X5A CHEMOTHERAPY-INDUCED NAUSEA: ICD-10-CM

## 2023-11-30 DIAGNOSIS — E78.5 DYSLIPIDEMIA: ICD-10-CM

## 2023-11-30 DIAGNOSIS — Z86.39 HISTORY OF DIABETES MELLITUS: ICD-10-CM

## 2023-11-30 DIAGNOSIS — R11.0 CHEMOTHERAPY-INDUCED NAUSEA: ICD-10-CM

## 2023-11-30 DIAGNOSIS — G89.3 CANCER RELATED PAIN: ICD-10-CM

## 2023-11-30 LAB
BASOPHILS # BLD AUTO: 0 X10E3/UL (ref 0–0.2)
BASOPHILS NFR BLD AUTO: 1 %
EOSINOPHIL # BLD AUTO: 0 X10E3/UL (ref 0–0.4)
EOSINOPHIL NFR BLD AUTO: 1 %
ERYTHROCYTE [DISTWIDTH] IN BLOOD BY AUTOMATED COUNT: 16.2 % (ref 11.6–15.4)
HCT VFR BLD AUTO: 41.9 % (ref 37.5–51)
HGB BLD-MCNC: 14.4 G/DL (ref 13–17.7)
IMM GRANULOCYTES # BLD: 0.1 X10E3/UL (ref 0–0.1)
IMM GRANULOCYTES NFR BLD: 1 %
LYMPHOCYTES # BLD AUTO: 2.8 X10E3/UL (ref 0.7–3.1)
LYMPHOCYTES NFR BLD AUTO: 45 %
MCH RBC QN AUTO: 34 PG (ref 26.6–33)
MCHC RBC AUTO-ENTMCNC: 34.4 G/DL (ref 31.5–35.7)
MCV RBC AUTO: 99 FL (ref 79–97)
MONOCYTES # BLD AUTO: 0.8 X10E3/UL (ref 0.1–0.9)
MONOCYTES NFR BLD AUTO: 13 %
NEUTROPHILS # BLD AUTO: 2.4 X10E3/UL (ref 1.4–7)
NEUTROPHILS NFR BLD AUTO: 39 %
NRBC BLD AUTO-RTO: 1 % (ref 0–0)
PLATELET # BLD AUTO: 168 X10E3/UL (ref 150–450)
RBC # BLD AUTO: 4.23 X10E6/UL (ref 4.14–5.8)
WBC # BLD AUTO: 6.1 X10E3/UL (ref 3.4–10.8)

## 2023-11-30 PROCEDURE — 99214 OFFICE O/P EST MOD 30 MIN: CPT | Performed by: INTERNAL MEDICINE

## 2023-11-30 NOTE — PROGRESS NOTES
HPI:  Patient is here with his wife. Follow-up visit for lower rectal cancer at the dentate line and patient is on neoadjuvant chemotherapy and #6 of 8 treatment will be on 12/4/2023. After 8 treatments he will have radiation and infusion 5-FU and then surgery. He has been tolerating treatment without much problem. This has much less rectal pain. No more rectal bleeding. No significant peripheral neuropathy. He has stopped losing weight. He is eating better. Bowel movements are getting better. Patient had a colonoscopy in August 2023 and this was his first colonoscopy and that showed anterior based fungating fixed rectal tumor at the dentate line. Additional benign polyps. A. Colon, random colon biopsy:  - Fragments of colonic mucosa with increased intraepithelial lymphocytes and focal surface epithelial damage.  - There is no increase in subepithelial collagen thickness. See note. Note (A): The above morphologic features may be compatible with lymphocytic (microscopic) colitis in conjunction with appropriate clinical and endoscopic findings. B. Large Intestine, Transverse Colon, hot snare transverse colon polyp:  - Tubular adenoma, fragments. - Negative for high grade dysplasia/ carcinoma. C. Large Intestine, Left/Descending Colon, hot snare descending colon polyp:  - Benign colonic mucosa without significant microscopic abnormality. D. Large Intestine, Sigmoid Colon, hot snare mid sigmoid polyp:  - Tubular adenoma. - Negative for high grade dysplasia/ carcinoma. E. Large Intestine, Sigmoid Colon, distal sigmoid polyps- hot snare  x1 / cold snare  2:  - Tubular adenoma x 2, negative for high grade dysplasia/ carcinoma. - Hyperplastic polyp, negative for dysplasia/ carcinoma. F. Rectum, biopsy rectal mass:  - Adenocarcinoma, superficial fragments. See note. - MMR panel is pending.      Note (F): This case was reviewed at the intradepartmental  conference.    Dr. Barragan Daily is notified of the diagnosis in [facility] via [employer] on 2023  at 1.30 pm.   Electronically signed by Omar Palomo MD on 2023 at  1:41 PM     .Antibody          Clone               Description                           Results  MLH1               M1                   Mismatch repair protein       Intact nuclear expression  MSH2              [id number]       Mismatch repair protein       Intact nuclear expression  MSH6              40                     Mismatch repair protein       Intact nuclear expression  PMS2              [id number]           Mismatch repair protein       Intact nuclear expression     Historical Information   Past Medical History:   Diagnosis Date    Diabetes mellitus ([address])     Hyperlipidemia      Past Surgical History:   Procedure Laterality Date    GALLBLADDER SURGERY  2013    IR PORT PLACEMENT  2023    TONSILLECTOMY      UMBILICAL HERNIA REPAIR       Social History   Social History     Substance and Sexual Activity   Alcohol Use Never     Social History     Substance and Sexual Activity   Drug Use Never     Social History     Tobacco Use   Smoking Status Former    Packs/day: 2.00    Years: 30.00    Total pack years: 60.00    Types: Cigarettes    Quit date:     Years since quittin.9    Passive exposure: Past   Smokeless Tobacco Never     Family History:   Family History   Problem Relation Age of Onset    No Known Problems Mother     No Known Problems Father          Current Outpatient Medications:     buPROPion (WELLBUTRIN XL) 300 mg 24 hr tablet, Take 300 mg by mouth daily, Disp: , Rfl:     escitalopram (LEXAPRO) 20 mg tablet, Take 20 mg by mouth daily, Disp: , Rfl:     [START ON 2023] fluorouracil 5,255 mg in CADD/Elastomeric Infusion Device, Infuse 5,255 mg (1,200 mg/m2/day x 2.19 m2) into a catheter in a vein via infusion device over 46 hours for 2 days  Infusion planned for 2023., Disp: 1 each, Rfl: 0    ibuprofen (MOTRIN) 200 mg tablet, Take 600 mg by mouth every 6 (six) hours, Disp: , Rfl:     lidocaine-prilocaine (EMLA) cream, Apply topically as needed for mild pain, Disp: 1 g, Rfl: 1    lisinopril (ZESTRIL) 5 mg tablet, Take 5 mg by mouth daily, Disp: , Rfl:     metFORMIN (GLUCOPHAGE) 1000 MG tablet, Take 1,000 mg by mouth 2 (two) times a day, Disp: , Rfl:     ondansetron (ZOFRAN) 8 mg tablet, Take one tablet by mouth every 8 hours an needed for nausea, Disp: 20 tablet, Rfl: 1    rosuvastatin (CRESTOR) 20 MG tablet, Take 20 mg by mouth daily, Disp: , Rfl:     traMADol (Ultram) 50 mg tablet, Take 1 tablet (50 mg total) by mouth every 4 (four) hours as needed for moderate pain, Disp: 120 tablet, Rfl: 0    prochlorperazine (COMPAZINE) 10 mg tablet, Take 1 tablet (10 mg total) by mouth every 6 (six) hours as needed for nausea or vomiting (Patient not taking: Reported on 11/30/2023), Disp: 30 tablet, Rfl: 0    Allergies   Allergen Reactions    Atorvastatin Myalgia   ROS:  09/13/23 Reviewed 12 systems: See symptoms in HPI  Presently no other neurological, cardiac, pulmonary, GI and  symptoms other than listed in HPI. Other symptoms are in HPI. No other symptoms like fever, chills,  bone pains, skin rash,  night sweats, arthritic symptoms,  tiredness , weakness, numbness, claudication and gait problem. No frequent infections. Not unusually sensitive to heat or cold. No swelling of the ankles. No swollen glands. Patient is much less anxious. Physical Exam:  Vitals:    11/30/23 0942   BP: 130/80   BP Location: Left arm   Patient Position: Sitting   Cuff Size: Adult   Pulse: 103   Resp: 17   Temp: (!) 97.2 °F (36.2 °C)   SpO2: 97%   Weight: 94.3 kg (208 lb)   Height: 6' (1.829 m)   Patient is alert and oriented. Patient is not in distress. Vital signs are above. There is no icterus. There is no oral thrush. There is no palpable neck mass. Lung fields are clear. Heart rate is regular. No palpable abdominal mass. Soft and nontender abdomen. There is no ascites. There is no edema of the ankles. There is no calf tenderness. There is no focal neurological deficit, no skin rash, there is no palpable lymphadenopathy in the neck and axillary areas,  no clubbing. Patient is anxious. Performance status 0 . Lab Results: I have reviewed all pertinent labs.   LABS:    Results for orders placed or performed in visit on 11/03/23   CBC and differential   Result Value Ref Range    White Blood Cell Count 6.4 3.4 - 10.8 x10E3/uL    Red Blood Cell Count 4.46 4.14 - 5.80 x10E6/uL    Hemoglobin 14.9 13.0 - 17.7 g/dL    HCT 42.6 37.5 - 51.0 %    MCV 96 79 - 97 fL    MCH 33.4 (H) 26.6 - 33.0 pg    MCHC 35.0 31.5 - 35.7 g/dL    RDW 14.9 11.6 - 15.4 %    Platelet Count 275 893 - 450 x10E3/uL    Neutrophils 34 Not Estab. %    Lymphocytes 52 Not Estab. %    Monocytes 11 Not Estab. %    Eosinophils 1 Not Estab. %    Basophils PCT 1 Not Estab. %    Neutrophils (Absolute) 2.2 1.4 - 7.0 x10E3/uL    Lymphocytes (Absolute) 3.3 (H) 0.7 - 3.1 x10E3/uL    Monocytes (Absolute) 0.7 0.1 - 0.9 x10E3/uL    Eosinophils (Absolute) 0.1 0.0 - 0.4 x10E3/uL    Basophils ABS 0.0 0.0 - 0.2 x10E3/uL    Immature Granulocytes 1 Not Estab. %    Immature Granulocytes (Absolute) 0.1 0.0 - 0.1 x10E3/uL   Comprehensive metabolic panel   Result Value Ref Range    Glucose, Random 184 (H) 70 - 99 mg/dL    BUN 21 6 - 24 mg/dL    Creatinine 0.84 0.76 - 1.27 mg/dL    eGFR 102 >59 mL/min/1.73    SL AMB BUN/CREATININE RATIO 25 (H) 9 - 20    Sodium 140 134 - 144 mmol/L    Potassium 4.5 3.5 - 5.2 mmol/L    Chloride 102 96 - 106 mmol/L    CO2 22 20 - 29 mmol/L    CALCIUM 9.7 8.7 - 10.2 mg/dL    Protein, Total 6.6 6.0 - 8.5 g/dL    Albumin 4.4 3.8 - 4.9 g/dL    Globulin, Total 2.2 1.5 - 4.5 g/dL    Albumin/Globulin Ratio 2.0 1.2 - 2.2    TOTAL BILIRUBIN 0.6 0.0 - 1.2 mg/dL    Alk Phos Isoenzymes 82 44 - 121 IU/L    AST 26 0 - 40 IU/L    ALT 47 (H) 0 - 44 IU/L   CEA   Result Value Ref Range    CEA 5.1 (H) 0.0 - 4.7 ng/mL       0 Result Notes        Component  Ref Range & Units 11/16/23  6:41 AM 9/22/23  7:47 AM 9/5/23  6:57 AM   CEA  0.0 - 4.7 ng/mL 5.1 High  33.4 High  CM 36.2 High  CM   Comment:                              Nonsmokers          <3.9          Imaging Studies: I have personally reviewed pertinent reports. IMPRESSION:  Unenhanced MRI of the Pelvis (Rectal Protocol)     Low rectal cancer, 3.5 cm from the anal verge, 6.3 cm long, centered along the anterior rectal wall. Anteriorly, the tumor invades the right apex of the prostate (series 5 images 26-27.)     Overall MRI stage: T4b, N1, Low rectal cancer  MRF: Not applicable for T4 tumor. Sphincter involvement: Yes. There is internal sphincter invasion in the posterior half of the upper internal sphincter (series 8 images 14-15, and series 2 image 13.)  Suspicious extra mesorectal lymph nodes: There is a suspicious superior rectal chain 9 mm node (series 4 image 13 and series 7 image 13.)  EMVI: No     For the purpose of radiation therapy planning, series 5 and 7 would be most useful. (Series 5 is a small field of view axial T2 weighted sequence typically most useful for radiation therapy planning, however the superior rectal chain node was not   included on this sequence, and is better seen on series 7.)                 Workstation performed: [id number]        Imaging    MRI pelvis rectal cancer staging wo contrast (Order: [id number]) - 9/11/2023  IMPRESSION:     No evidence of metastatic disease in the chest, abdomen, and pelvis. Please see subsequent scheduled rectal cancer protocol pelvic MRI for evaluation of local tumor staging. Workstation performed: [id number]        Imaging    CT chest abdomen pelvis w contrast (Order: [id number]) - 9/7/2023    Pathology, and Other Studies: I have personally reviewed pertinent reports.     See above    Assessment and Plan:  See diagnoses, orders instructions below   Patient is here with his wife. Follow-up visit for lower rectal cancer at the dentate line and patient is on neoadjuvant chemotherapy and #6 of 8 treatment will be on 12/4/2023. After 8 treatments he will have radiation and infusion 5-FU and then surgery. He has been tolerating treatment without much problem. This has much less rectal pain. No more rectal bleeding. No significant peripheral neuropathy. He has stopped losing weight. He is eating better. Bowel movements are getting better. Patient had a colonoscopy in August 2023 and this was his first colonoscopy and that showed anterior based fungating fixed rectal tumor at the dentate line. Additional benign polyps. A. Colon, random colon biopsy:  - Fragments of colonic mucosa with increased intraepithelial lymphocytes and focal surface epithelial damage.  - There is no increase in subepithelial collagen thickness. See note. Note (A): The above morphologic features may be compatible with lymphocytic (microscopic) colitis in conjunction with appropriate clinical and endoscopic findings. B. Large Intestine, Transverse Colon, hot snare transverse colon polyp:  - Tubular adenoma, fragments. - Negative for high grade dysplasia/ carcinoma. C. Large Intestine, Left/Descending Colon, hot snare descending colon polyp:  - Benign colonic mucosa without significant microscopic abnormality. D. Large Intestine, Sigmoid Colon, hot snare mid sigmoid polyp:  - Tubular adenoma. - Negative for high grade dysplasia/ carcinoma. E. Large Intestine, Sigmoid Colon, distal sigmoid polyps- hot snare  x1 / cold snare  2:  - Tubular adenoma x 2, negative for high grade dysplasia/ carcinoma. - Hyperplastic polyp, negative for dysplasia/ carcinoma. F. Rectum, biopsy rectal mass:  - Adenocarcinoma, superficial fragments. See note. - MMR panel is pending.      Note (F): This case was reviewed at the intradepartmental  conference. Dr. Semaj Brown is notified of the diagnosis in [facility] via [employer] on 08/25/2023  at 1.30 pm.   Electronically signed by Gaby Ugarte MD on 8/25/2023 at  1:41 PM     .Antibody          Clone               Description                           Results  MLH1               M1                   Mismatch repair protein       Intact nuclear expression  MSH2              [id number]       Mismatch repair protein       Intact nuclear expression  MSH6              40                     Mismatch repair protein       Intact nuclear expression  PMS2              [id number]           Mismatch repair protein       Intact nuclear expression   Physical examination and test results are as recorded and discussed. Patient is responding to therapy. Improvement in symptoms. CEA has come down from 36-5. Plan is as above. Goal is cure from rectal cancer if possible. Diet and activities as tolerated. Later on health screening test.  Patient is capable of self-care. 1. Rectal adenocarcinoma ([address])      2. Regional lymph node metastasis present ([address])      3. Abnormal tumor markers      4. History of diabetes mellitus      5. Dyslipidemia      6. Chemotherapy-induced nausea      7. Chemotherapy induced neutropenia       8. Cancer related pain      Total 8 treatments. Please remove treatment #9. After completion of 8 treatments he will be receiving continuous 5-FU infusion with radiation. Hook up will be every Monday morning and unhook on Friday afternoon. He could have blood work on Friday afternoon when disconnected and that can be used for the next week. Next appointment in 5 weeks. Disclaimer: This document was prepared using dictation device. If a word or phrase is confusing, or does not make sense, this is likely due to recognition error which was not discovered during the providers review.  If you believe an error has occurred, please Contact me through [employer] service for tiara?cation. Counseling / Coordination of Care  . Richa Perez   Provided counseling and support

## 2023-11-30 NOTE — TELEPHONE ENCOUNTER
Total 8 treatments. Please remove treatment #9. After completion of 8 treatments he will be receiving continuous 5-FU infusion with radiation. Hook up will be every Monday morning and unhook on Friday afternoon. He could have blood work on Friday afternoon when disconnected and that can be used for the next week. Next appointment in 5 weeks. for the nurses will take Xarelto and they will set things up. *Please see notes above about treatment. Please also schedule f/u appt. Thank you!

## 2023-11-30 NOTE — PATIENT INSTRUCTIONS
Total 8 treatments. Please remove treatment #9. After completion of 8 treatments he will be receiving continuous 5-FU infusion with radiation. Hook up will be every Monday morning and unhook on Friday afternoon. He could have blood work on Friday afternoon when disconnected and that can be used for the next week. Next appointment in 5 weeks.

## 2023-12-01 LAB
ALBUMIN SERPL-MCNC: 4.3 G/DL (ref 3.8–4.9)
ALBUMIN/GLOB SERPL: 2.3 {RATIO} (ref 1.2–2.2)
ALP SERPL-CCNC: 84 IU/L (ref 44–121)
ALT SERPL-CCNC: 60 IU/L (ref 0–44)
AST SERPL-CCNC: 29 IU/L (ref 0–40)
BILIRUB SERPL-MCNC: 0.5 MG/DL (ref 0–1.2)
BUN SERPL-MCNC: 15 MG/DL (ref 6–24)
BUN/CREAT SERPL: 19 (ref 9–20)
CALCIUM SERPL-MCNC: 9.4 MG/DL (ref 8.7–10.2)
CHLORIDE SERPL-SCNC: 104 MMOL/L (ref 96–106)
CO2 SERPL-SCNC: 20 MMOL/L (ref 20–29)
CREAT SERPL-MCNC: 0.78 MG/DL (ref 0.76–1.27)
EGFR: 105 ML/MIN/1.73
GLOBULIN SER-MCNC: 1.9 G/DL (ref 1.5–4.5)
GLUCOSE SERPL-MCNC: 213 MG/DL (ref 70–99)
POTASSIUM SERPL-SCNC: 4.3 MMOL/L (ref 3.5–5.2)
PROT SERPL-MCNC: 6.2 G/DL (ref 6–8.5)
SODIUM SERPL-SCNC: 142 MMOL/L (ref 134–144)

## 2023-12-04 ENCOUNTER — HOSPITAL ENCOUNTER (OUTPATIENT)
Dept: INFUSION CENTER | Facility: HOSPITAL | Age: 58
Discharge: HOME | End: 2023-12-04
Attending: INTERNAL MEDICINE
Payer: COMMERCIAL

## 2023-12-04 VITALS
BODY MASS INDEX: 28.73 KG/M2 | HEIGHT: 72 IN | WEIGHT: 212.08 LBS | SYSTOLIC BLOOD PRESSURE: 111 MMHG | OXYGEN SATURATION: 96 % | HEART RATE: 96 BPM | DIASTOLIC BLOOD PRESSURE: 83 MMHG | RESPIRATION RATE: 16 BRPM | TEMPERATURE: 96.8 F

## 2023-12-04 DIAGNOSIS — C20 RECTAL ADENOCARCINOMA (HCC): Primary | ICD-10-CM

## 2023-12-04 PROCEDURE — 96368 THER/DIAG CONCURRENT INF: CPT

## 2023-12-04 PROCEDURE — G0498 CHEMO EXTEND IV INFUS W/PUMP: HCPCS

## 2023-12-04 PROCEDURE — 96411 CHEMO IV PUSH ADDL DRUG: CPT

## 2023-12-04 PROCEDURE — 96367 TX/PROPH/DG ADDL SEQ IV INF: CPT

## 2023-12-04 PROCEDURE — 96415 CHEMO IV INFUSION ADDL HR: CPT

## 2023-12-04 PROCEDURE — 96413 CHEMO IV INFUSION 1 HR: CPT

## 2023-12-04 RX ORDER — SODIUM CHLORIDE 9 MG/ML
20 INJECTION, SOLUTION INTRAVENOUS ONCE AS NEEDED
Status: DISCONTINUED | OUTPATIENT
Start: 2023-12-04 | End: 2023-12-07 | Stop reason: HOSPADM

## 2023-12-04 RX ORDER — DEXTROSE MONOHYDRATE 50 MG/ML
20 INJECTION, SOLUTION INTRAVENOUS ONCE
Status: COMPLETED | OUTPATIENT
Start: 2023-12-04 | End: 2023-12-04

## 2023-12-04 RX ORDER — FLUOROURACIL 50 MG/ML
400 INJECTION, SOLUTION INTRAVENOUS ONCE
Status: COMPLETED | OUTPATIENT
Start: 2023-12-04 | End: 2023-12-04

## 2023-12-04 RX ADMIN — DEXTROSE 20 ML/HR: 5 SOLUTION INTRAVENOUS at 08:42

## 2023-12-04 RX ADMIN — LEUCOVORIN CALCIUM 900 MG: 500 INJECTION, POWDER, LYOPHILIZED, FOR SOLUTION INTRAMUSCULAR; INTRAVENOUS at 09:16

## 2023-12-04 RX ADMIN — DEXAMETHASONE SODIUM PHOSPHATE: 100 INJECTION INTRAMUSCULAR; INTRAVENOUS at 08:15

## 2023-12-04 RX ADMIN — SODIUM CHLORIDE 20 ML/HR: 0.9 INJECTION, SOLUTION INTRAVENOUS at 08:14

## 2023-12-04 RX ADMIN — FLUOROURACIL 875 MG: 50 INJECTION, SOLUTION INTRAVENOUS at 11:29

## 2023-12-04 RX ADMIN — OXALIPLATIN 186.15 MG: 100 INJECTION, SOLUTION, CONCENTRATE INTRAVENOUS at 09:16

## 2023-12-04 NOTE — PROGRESS NOTES
Patient chemo infusion completed without complication. Elastomeric infusion pump connected per protocol, double checked and unclamped by second RN. Patient aware of disconnect time and appointment. Patient declined AVS at this time and was discharged in stable condition to home via ambulation.

## 2023-12-06 ENCOUNTER — HOSPITAL ENCOUNTER (OUTPATIENT)
Dept: INFUSION CENTER | Facility: HOSPITAL | Age: 58
Discharge: HOME | End: 2023-12-06
Attending: INTERNAL MEDICINE

## 2023-12-06 DIAGNOSIS — C20 RECTAL ADENOCARCINOMA (HCC): Primary | ICD-10-CM

## 2023-12-11 ENCOUNTER — PATIENT OUTREACH (OUTPATIENT)
Dept: HEMATOLOGY ONCOLOGY | Facility: CLINIC | Age: 58
End: 2023-12-11

## 2023-12-11 DIAGNOSIS — C20 RECTAL ADENOCARCINOMA (HCC): Primary | ICD-10-CM

## 2023-12-11 RX ORDER — DEXTROSE MONOHYDRATE 50 MG/ML
20 INJECTION, SOLUTION INTRAVENOUS ONCE
Status: CANCELLED | OUTPATIENT
Start: 2023-12-18

## 2023-12-11 RX ORDER — SODIUM CHLORIDE 9 MG/ML
20 INJECTION, SOLUTION INTRAVENOUS ONCE AS NEEDED
Status: CANCELLED | OUTPATIENT
Start: 2023-12-18

## 2023-12-11 RX ORDER — FLUOROURACIL 50 MG/ML
400 INJECTION, SOLUTION INTRAVENOUS ONCE
Status: CANCELLED | OUTPATIENT
Start: 2023-12-18

## 2023-12-11 NOTE — PROGRESS NOTES
MSW received an email from pt's wife asking about financial assistance. Outreach was made this day and she was open and receptive of this call. The pt's wife shared that the pt was not able to work and all of the bills are able to be paid on her salary. Now that they have been receiving hospital bills, they are not able to pay that and maintain their household bills. MSW provided information on the [facility] care through the hospital and she was interested in this. Application was mailed out this day. MSW also provided information on the cancer funds and how she could apply. At this time, the pt's wife felt that receiving assistance from the hospital bills would be sufficient and she will reach out at a later date if needed. Emotional support provided.

## 2023-12-13 ENCOUNTER — CLINICAL SUPPORT (OUTPATIENT)
Facility: HOSPITAL | Age: 58
End: 2023-12-13
Attending: RADIOLOGY

## 2023-12-13 ENCOUNTER — RADIATION ONCOLOGY CONSULT (OUTPATIENT)
Facility: HOSPITAL | Age: 58
End: 2023-12-13

## 2023-12-13 VITALS
HEART RATE: 101 BPM | BODY MASS INDEX: 27.61 KG/M2 | WEIGHT: 203.6 LBS | OXYGEN SATURATION: 99 % | DIASTOLIC BLOOD PRESSURE: 85 MMHG | RESPIRATION RATE: 18 BRPM | TEMPERATURE: 97.5 F | SYSTOLIC BLOOD PRESSURE: 133 MMHG

## 2023-12-13 DIAGNOSIS — C20 RECTAL ADENOCARCINOMA (HCC): ICD-10-CM

## 2023-12-13 DIAGNOSIS — C20 RECTAL ADENOCARCINOMA (HCC): Primary | ICD-10-CM

## 2023-12-13 NOTE — PROGRESS NOTES
Michele Barnhart 1965 is a 66 y.o. male Patient is a 66 y.o male with T4 b N1 rectal cancer. He presents today for radiation oncology consult. CEA's  11/16/23   5.1  9/22/23   33.4  9/5/23   36.2    He was seen by GI for consult in July 2023 with reports of difficulty having bowel movements with rectal spasm for a few months. He also had a 20 lb weight loss in the last few months. His rectal exam was normal. He had a colonoscopy 8/22/23 which showed Friable, malignant-appearing and ulcerated mass measuring 7 cm x 5 cm in the distal rectum 0 cm from the anal verge, covering one half of the circumference. Pathology showed adenocarcinoma. CT showed no evidence of metastatic disease. He was seen by Dr. Prem Diana and Dr. Hayes Reynoso and total neoadjuvant therapy with FOLFOX followed by 5FU plus radiation followed by surgery was recommended. 8/22/23 colonoscopy-  The terminal ileum appeared normal.  The entire colon appeared normal. Performed random biopsy using biopsy forceps. 4 subcentimeter polyps were removed  12 mm polyp in the mid sigmoid colon; removed by hot snare  15 mm polyp in the distal sigmoid colon; removed by hot snare  Friable, malignant-appearing and ulcerated mass measuring 7 cm x 5 cm in the distal rectum 0 cm from the anal verge, covering one half of the circumference; bleeding occurred after intervention; performed partial removal by cold forceps biopsy  Final Diagnosis  F. Rectum, biopsy rectal mass:  - Adenocarcinoma, superficial fragments. See note. - MMR panel is pending. 9/7/23 CT C/A/P-  No evidence of metastatic disease in the chest, abdomen, and pelvis. 9/11/23 MRI pelvis-   Low rectal cancer, 3.5 cm from the anal verge, 6.3 cm long, centered along the anterior rectal wall. Anteriorly, the tumor invades the right apex of the prostate (series 5 images 26-27.)  Overall MRI stage: T4b, N1, Low rectal cancer  MRF: Not applicable for T4 tumor. Sphincter involvement: Yes.  There is internal sphincter invasion in the posterior half of the upper internal sphincter (series 8 images 14-15, and series 2 image 13.)  Suspicious extra mesorectal lymph nodes: There is a suspicious superior rectal chain 9 mm node (series 4 image 13 and series 7 image 13.)  EMVI: No    23 tumor board discussion-  Recommend chemo FOLFOX x 4 months, concurrent chemo 5FU/RT x 6 weeks. Anticipated surgery with LAR 23 started chemo    23 Dr. Ann-Marie Hughes- Neoadjuvant chemo 6 out of 8 on 23. After 8 treatments he will have radiation and 5-FU and then surgery. Tolerating treatment. He has much less rectal pain and no bleeding. Upcomin23 infusion, cycle 7  24 infusion, cycle 8 - Last chemo     Oncology History Overview Note   Patient is a 66 y.o male with T4 b N1 rectal cancer. He presents today for radiation oncology consult. CEA's  23   5.1  23   33.4  23   36.2    He was seen by GI for consult in 2023 with reports of difficulty having bowel movements with rectal spasm for a few months. He also had a 20 lb weight loss in the last few months. His rectal exam was normal. He had a colonoscopy 23 which showed Friable, malignant-appearing and ulcerated mass measuring 7 cm x 5 cm in the distal rectum 0 cm from the anal verge, covering one half of the circumference. Pathology showed adenocarcinoma. CT showed no evidence of metastatic disease. He was seen by Dr. Caitlin Lanza and Dr. Ann-Marie Hughes and total neoadjuvant therapy with FOLFOX followed by 5FU plus radiation followed by surgery was recommended. 23 colonoscopy-  The terminal ileum appeared normal.  The entire colon appeared normal. Performed random biopsy using biopsy forceps.   4 subcentimeter polyps were removed  12 mm polyp in the mid sigmoid colon; removed by hot snare  15 mm polyp in the distal sigmoid colon; removed by hot snare  Friable, malignant-appearing and ulcerated mass measuring 7 cm x 5 cm in the distal rectum 0 cm from the anal verge, covering one half of the circumference; bleeding occurred after intervention; performed partial removal by cold forceps biopsy  Final Diagnosis  F. Rectum, biopsy rectal mass:  - Adenocarcinoma, superficial fragments. See note. - MMR panel is pending. 23 CT C/A/P-  No evidence of metastatic disease in the chest, abdomen, and pelvis. 23 MRI pelvis-   Low rectal cancer, 3.5 cm from the anal verge, 6.3 cm long, centered along the anterior rectal wall. Anteriorly, the tumor invades the right apex of the prostate (series 5 images 26-27.)  Overall MRI stage: T4b, N1, Low rectal cancer  MRF: Not applicable for T4 tumor. Sphincter involvement: Yes. There is internal sphincter invasion in the posterior half of the upper internal sphincter (series 8 images 14-15, and series 2 image 13.)  Suspicious extra mesorectal lymph nodes: There is a suspicious superior rectal chain 9 mm node (series 4 image 13 and series 7 image 13.)  EMVI: No    23 tumor board discussion-  Recommend chemo FOLFOX x 4 months, concurrent chemo 5FU/RT x 6 weeks. Anticipated surgery with LAR 23 started chemo    23 Dr. Nidhi Adler- Neoadjuvant chemo 6 out of 8 on 23. After 8 treatments he will have radiation and 5-FU and then surgery. Tolerating treatment. He has much less rectal pain and no bleeding. Upcomin23 infusion, cycle 7  24 infusion, cycle 8     Rectal adenocarcinoma ([address])   2023 Initial Diagnosis    Rectal adenocarcinoma ([address])     2023 Biopsy    Final Diagnosis  F. Rectum, biopsy rectal mass:  - Adenocarcinoma, superficial fragments. See note. - MMR panel is pending.      2023 -  Cancer Staged    Staging form: Colon and Rectum, AJCC 8th Edition  - Clinical stage from 2023: Stage IIIC (cT4b, cN1, cM0) - Signed by Jo Cole MD on 2023  Stage prefix: Initial diagnosis  Histologic grade (G): GX  Histologic grading system: 4 grade system       9/25/2023 -  Chemotherapy    alteplase (CATHFLO), 2 mg, Intracatheter, Every 1 Minute as needed, 6 of 8 cycles  fluorouracil (ADRUCIL), 400 mg/m2 = 875 mg, Intravenous, Once, 6 of 8 cycles  Administration: 875 mg (9/25/2023), 875 mg (10/9/2023), 875 mg (10/23/2023), 875 mg (11/6/2023), 875 mg (11/20/2023), 875 mg (12/4/2023)  leucovorin calcium IVPB, 876 mg, Intravenous, Once, 6 of 8 cycles  Administration: 900 mg (9/25/2023), 900 mg (10/9/2023), 900 mg (10/23/2023), 900 mg (11/6/2023), 900 mg (11/20/2023), 900 mg (12/4/2023)  oxaliplatin (ELOXATIN) chemo infusion, 85 mg/m2 = 186.15 mg, Intravenous, Once, 6 of 8 cycles  Administration: 186.15 mg (9/25/2023), 186.15 mg (10/9/2023), 186.15 mg (10/23/2023), 186.15 mg (11/6/2023), 186.15 mg (11/20/2023), 186.15 mg (12/4/2023)  fluorouracil (ADRUCIL) ambulatory infusion Soln, 1,200 mg/m2/day = 5,255 mg, Intravenous, Over 46 hours, 6 of 8 cycles     1/30/2024 -  Chemotherapy    alteplase (CATHFLO), 2 mg, Intracatheter, Every 1 Minute as needed, 0 of 5 cycles  fluorouracil (ADRUCIL) ambulatory infusion Soln, 225 mg/m2/day, Intravenous, Over 120 hours, 0 of 5 cycles         Review of Systems:  Review of Systems   Constitutional:  Positive for fatigue and unexpected weight change. HENT:  Positive for mouth sores and sore throat. Eyes: Negative. Respiratory: Negative. Cardiovascular: Negative. Gastrointestinal:  Positive for nausea and vomiting. Endocrine: Positive for heat intolerance. Musculoskeletal: Negative. Allergic/Immunologic: Positive for environmental allergies. Neurological:  Positive for weakness and numbness. Psychiatric/Behavioral:  The patient is nervous/anxious.         Clinical Trial: no      Pain assessment: 0    PFT yes    Prior Radiation no    Teaching yes    MST yes    Implantable Devices (Port, pacemaker, pain stimulator) Port    Hip Replacement no    Health Maintenance   Topic Date Due    Hepatitis C Screening  Never done    Kidney Health Evaluation: Albumin/Creatinine Ratio  Never done    Pneumococcal Vaccine: Pediatrics (0 to 5 Years) and At-Risk Patients (6 to 59 Years) (1 - PCV) Never done    Diabetic Foot Exam  Never done    DM Eye Exam  Never done    HIV Screening  Never done    BMI: Followup Plan  Never done    Annual Physical  Never done    DTaP,Tdap,and Td Vaccines (1 - Tdap) 2009    COVID-19 Vaccine (2 - [place] risk series) 2021    Influenza Vaccine (1) Never done    HEMOGLOBIN A1C  2023    Lung Cancer Screening  2024    Kidney Health Evaluation: GFR  2024    BMI: Adult  2024    Depression Screening  2024    HIB Vaccine  Aged Out    IPV Vaccine  Aged Out    Hepatitis A Vaccine  Aged Out    Meningococcal ACWY Vaccine  Aged Out    HPV Vaccine  Aged Out    Colorectal Cancer Screening  Discontinued       Past Medical History:   Diagnosis Date    Diabetes mellitus ([address])     Hyperlipidemia        Past Surgical History:   Procedure Laterality Date    GALLBLADDER SURGERY      IR PORT PLACEMENT  2023    TONSILLECTOMY      UMBILICAL HERNIA REPAIR         Family History   Problem Relation Age of Onset    No Known Problems Mother     No Known Problems Father        Social History     Tobacco Use    Smoking status: Former     Current packs/day: 0.00     Average packs/day: 2.0 packs/day for 30.0 years (60.0 ttl pk-yrs)     Types: Cigarettes     Start date: 0     Quit date:      Years since quittin.9     Passive exposure: Past    Smokeless tobacco: Never   Vaping Use    Vaping status: Never Used   Substance Use Topics    Alcohol use: Never    Drug use: Never          Current Outpatient Medications:     prochlorperazine (COMPAZINE) 10 mg tablet, Take 1 tablet (10 mg total) by mouth every 6 (six) hours as needed for nausea or vomiting, Disp: 30 tablet, Rfl: 0    buPROPion (WELLBUTRIN XL) 300 mg 24 hr tablet, Take 300 mg by mouth daily, Disp: , Rfl: escitalopram (LEXAPRO) 20 mg tablet, Take 20 mg by mouth daily, Disp: , Rfl:     [START ON 12/18/2023] fluorouracil 5,255 mg in CADD/Elastomeric Infusion Device, Infuse 5,255 mg (1,200 mg/m2/day x 2.19 m2) into a catheter in a vein via infusion device over 46 hours for 2 days  Infusion planned for December 18, 2023., Disp: 1 each, Rfl: 0    ibuprofen (MOTRIN) 200 mg tablet, Take 600 mg by mouth every 6 (six) hours, Disp: , Rfl:     lidocaine-prilocaine (EMLA) cream, Apply topically as needed for mild pain, Disp: 1 g, Rfl: 1    lisinopril (ZESTRIL) 5 mg tablet, Take 5 mg by mouth daily, Disp: , Rfl:     metFORMIN (GLUCOPHAGE) 1000 MG tablet, Take 1,000 mg by mouth 2 (two) times a day, Disp: , Rfl:     ondansetron (ZOFRAN) 8 mg tablet, Take one tablet by mouth every 8 hours an needed for nausea (Patient not taking: Reported on 12/13/2023), Disp: 20 tablet, Rfl: 1    rosuvastatin (CRESTOR) 20 MG tablet, Take 20 mg by mouth daily, Disp: , Rfl:     traMADol (Ultram) 50 mg tablet, Take 1 tablet (50 mg total) by mouth every 4 (four) hours as needed for moderate pain, Disp: 120 tablet, Rfl: 0    Allergies   Allergen Reactions    Atorvastatin Myalgia        Vitals:    12/13/23 1249   BP: 133/85   Pulse: 101   Resp: 18   Temp: 97.5 °F (36.4 °C)   SpO2: 99%   Weight: 92.4 kg (203 lb 9.6 oz)       Pain Score: 0-No pain

## 2023-12-13 NOTE — PROGRESS NOTES
Consultation - Radiation Oncology      ODK:[id number] : 1965  Encounter: [id number]  Patient Information: Fawad Urias      CHIEF COMPLAINT  Chief Complaint   Patient presents with    Consult     Cancer Staging   Rectal adenocarcinoma [facility])  Staging form: Colon and Rectum, AJCC 8th Edition  - Clinical stage from 2023: Stage IIIC (cT4b, cN1, cM0) - Signed by Yuan Galindo MD on 2023  Stage prefix: Initial diagnosis  Histologic grade (G): GX  Histologic grading system: 4 grade system           History of Present Illness   Fawad Urias is a 66 y.o. male with DM and HLD who presented for evaluation of altered bowel habits (smaller caliber stools with slight bleeding) and weight loss. Colonoscopy on 23 demonstrated the followin mm sessile polyp in the transverse colon, 9 mm sessile polyp in the descending colon, 12 mm sessile polyp in the mid sigmoid colon, 15 mm sessile polyp in the distal sigmoid colon, two sessile polyps in the distal sigmoid colon measuring 4-6 mm, and a friable-malignant appearing and ulcerated mass measuring 7 x 5 cm in the distal rectm 0 cm from the anal verge observed during AMRIK covering 50% of the circumference. Pathology from the rectal mass was consistent with adenocarcinoma with intact MMR protein expression. CT chest/abdomen and pelvis was performed on 23 demonstrating no evidence of metastatic disease or reported adenopathy. CEA was 36.2 on 23. MRI pelvis for rectal cancer staging was completed on 23. This demonstrated a T4b rectal cancer due to invasion of the right apex of the prostate. There was also involvement of the internal anal sphincter (located in the posterior half of the upper internal sphincter). The primary tumor measured 6.3 cm in craniocaudal length and involved 75% of the circumference. This was 3.5 cm from the anal verge. There was no reported EMVI. There were no reported tumor deposits.   There was a 9 mm suspicious appearing superior rectal/mesorectal lymph node. There were no suspicious extramesorectal lymph nodes. He was evaluated by  medical oncology and began neoadjuvant FOLFOX6 X 8 cycles. His last cycle is scheduled for 1/1/24. Most recent CEA was downtrending, 5.1 on 11/16/23. Upon interview, he endorses the above history. He denies rectal bleeding. He has ongoing hot flashes and insomnia. He has mild tingling in bilateral hands and feet. He has stable appetite. He is without additional acute concerns. Historical Information   Oncology History Overview Note   Patient is a 66 y.o male with T4 b N1 rectal cancer. He presents today for radiation oncology consult. CEA's  11/16/23   5.1  9/22/23   33.4  9/5/23   36.2    He was seen by GI for consult in July 2023 with reports of difficulty having bowel movements with rectal spasm for a few months. He also had a 20 lb weight loss in the last few months. His rectal exam was normal. He had a colonoscopy 8/22/23 which showed Friable, malignant-appearing and ulcerated mass measuring 7 cm x 5 cm in the distal rectum 0 cm from the anal verge, covering one half of the circumference. Pathology showed adenocarcinoma. CT showed no evidence of metastatic disease. He was seen by Dr. Marilee Cruz and Dr. Brooke Adame and total neoadjuvant therapy with FOLFOX followed by 5FU plus radiation followed by surgery was recommended. 8/22/23 colonoscopy-  The terminal ileum appeared normal.  The entire colon appeared normal. Performed random biopsy using biopsy forceps.   4 subcentimeter polyps were removed  12 mm polyp in the mid sigmoid colon; removed by hot snare  15 mm polyp in the distal sigmoid colon; removed by hot snare  Friable, malignant-appearing and ulcerated mass measuring 7 cm x 5 cm in the distal rectum 0 cm from the anal verge, covering one half of the circumference; bleeding occurred after intervention; performed partial removal by cold forceps biopsy  Final Diagnosis  F. Rectum, biopsy rectal mass:  - Adenocarcinoma, superficial fragments. See note. - MMR panel is pending. 23 CT C/A/P-  No evidence of metastatic disease in the chest, abdomen, and pelvis. 23 MRI pelvis-   Low rectal cancer, 3.5 cm from the anal verge, 6.3 cm long, centered along the anterior rectal wall. Anteriorly, the tumor invades the right apex of the prostate (series 5 images 26-27.)  Overall MRI stage: T4b, N1, Low rectal cancer  MRF: Not applicable for T4 tumor. Sphincter involvement: Yes. There is internal sphincter invasion in the posterior half of the upper internal sphincter (series 8 images 14-15, and series 2 image 13.)  Suspicious extra mesorectal lymph nodes: There is a suspicious superior rectal chain 9 mm node (series 4 image 13 and series 7 image 13.)  EMVI: No    23 tumor board discussion-  Recommend chemo FOLFOX x 4 months, concurrent chemo 5FU/RT x 6 weeks. Anticipated surgery with LAR 23 started chemo    23 Dr. Phoebe Saini- Neoadjuvant chemo 6 out of 8 on 23. After 8 treatments he will have radiation and 5-FU and then surgery. Tolerating treatment. He has much less rectal pain and no bleeding. Upcomin23 infusion, cycle 7  24 infusion, cycle 8     Rectal adenocarcinoma ([address])   2023 Initial Diagnosis    Rectal adenocarcinoma ([address])     2023 Biopsy    Final Diagnosis  F. Rectum, biopsy rectal mass:  - Adenocarcinoma, superficial fragments. See note. - MMR panel is pending.      2023 -  Cancer Staged    Staging form: Colon and Rectum, AJCC 8th Edition  - Clinical stage from 2023: Stage IIIC (cT4b, cN1, cM0) - Signed by Brisa Parson MD on 2023  Stage prefix: Initial diagnosis  Histologic grade (G): GX  Histologic grading system: 4 grade system       2023 -  Chemotherapy    alteplase (CATHFLO), 2 mg, Intracatheter, Every 1 Minute as needed, 6 of 8 cycles  fluorouracil (ADRUCIL), 400 mg/m2 = 875 mg, Intravenous, Once, 6 of 8 cycles  Administration: 875 mg (2023), 875 mg (10/9/2023), 875 mg (10/23/2023), 875 mg (2023), 875 mg (2023), 875 mg (2023)  leucovorin calcium IVPB, 876 mg, Intravenous, Once, 6 of 8 cycles  Administration: 900 mg (2023), 900 mg (10/9/2023), 900 mg (10/23/2023), 900 mg (2023), 900 mg (2023), 900 mg (2023)  oxaliplatin (ELOXATIN) chemo infusion, 85 mg/m2 = 186.15 mg, Intravenous, Once, 6 of 8 cycles  Administration: 186.15 mg (2023), 186.15 mg (10/9/2023), 186.15 mg (10/23/2023), 186.15 mg (2023), 186.15 mg (2023), 186.15 mg (2023)  fluorouracil (ADRUCIL) ambulatory infusion Soln, 1,200 mg/m2/day = 5,255 mg, Intravenous, Over 46 hours, 6 of 8 cycles     2024 -  Chemotherapy    alteplase (CATHFLO), 2 mg, Intracatheter, Every 1 Minute as needed, 0 of 5 cycles  fluorouracil (ADRUCIL) ambulatory infusion Soln, 225 mg/m2/day, Intravenous, Over 120 hours, 0 of 5 cycles           Past Medical History:   Diagnosis Date    Diabetes mellitus ([address])     Hyperlipidemia      Past Surgical History:   Procedure Laterality Date    GALLBLADDER SURGERY      IR PORT PLACEMENT  2023    TONSILLECTOMY      UMBILICAL HERNIA REPAIR         Family History   Problem Relation Age of Onset    No Known Problems Mother     No Known Problems Father        Social History   Social History     Substance and Sexual Activity   Alcohol Use Never     Social History     Substance and Sexual Activity   Drug Use Never     Social History     Tobacco Use   Smoking Status Former    Current packs/day: 0.00    Average packs/day: 2.0 packs/day for 30.0 years (60.0 ttl pk-yrs)    Types: Cigarettes    Start date: 0    Quit date:     Years since quittin.9    Passive exposure: Past   Smokeless Tobacco Never         Meds/Allergies     Current Outpatient Medications:     prochlorperazine (COMPAZINE) 10 mg tablet, Take 1 tablet (10 mg total) by mouth every 6 (six) hours as needed for nausea or vomiting, Disp: 30 tablet, Rfl: 0    buPROPion (WELLBUTRIN XL) 300 mg 24 hr tablet, Take 300 mg by mouth daily, Disp: , Rfl:     escitalopram (LEXAPRO) 20 mg tablet, Take 20 mg by mouth daily, Disp: , Rfl:     [START ON 12/18/2023] fluorouracil 5,255 mg in CADD/Elastomeric Infusion Device, Infuse 5,255 mg (1,200 mg/m2/day x 2.19 m2) into a catheter in a vein via infusion device over 46 hours for 2 days  Infusion planned for December 18, 2023., Disp: 1 each, Rfl: 0    ibuprofen (MOTRIN) 200 mg tablet, Take 600 mg by mouth every 6 (six) hours, Disp: , Rfl:     lidocaine-prilocaine (EMLA) cream, Apply topically as needed for mild pain, Disp: 1 g, Rfl: 1    lisinopril (ZESTRIL) 5 mg tablet, Take 5 mg by mouth daily, Disp: , Rfl:     metFORMIN (GLUCOPHAGE) 1000 MG tablet, Take 1,000 mg by mouth 2 (two) times a day, Disp: , Rfl:     ondansetron (ZOFRAN) 8 mg tablet, Take one tablet by mouth every 8 hours an needed for nausea (Patient not taking: Reported on 12/13/2023), Disp: 20 tablet, Rfl: 1    rosuvastatin (CRESTOR) 20 MG tablet, Take 20 mg by mouth daily, Disp: , Rfl:     traMADol (Ultram) 50 mg tablet, Take 1 tablet (50 mg total) by mouth every 4 (four) hours as needed for moderate pain, Disp: 120 tablet, Rfl: 0  Allergies   Allergen Reactions    Atorvastatin Myalgia         Review of Systems  Constitutional:  Positive for fatigue and unexpected weight change. HENT:  Positive for mouth sores and sore throat. Eyes: Negative. Respiratory: Negative. Cardiovascular: Negative. Gastrointestinal:  Positive for nausea and vomiting. Endocrine: Positive for heat intolerance. Musculoskeletal: Negative. Allergic/Immunologic: Positive for environmental allergies. Neurological:  Positive for weakness and numbness. Psychiatric/Behavioral:  The patient is nervous/anxious.         OBJECTIVE:   /85   Pulse 101   Temp 97.5 °F (36.4 °C)   Resp 18   Wt 92.4 kg (203 lb 9.6 oz)   SpO2 99%   BMI 27.61 kg/m²   Pain Assessment:  0  Performance Status: Karnofsky: 90 - Able to carry on normal activity; minor signs or symptoms of disease     Physical Exam  HENT:      Head: Normocephalic and atraumatic. Eyes:      General: No scleral icterus. Extraocular Movements: Extraocular movements intact. Cardiovascular:      Rate and Rhythm: Normal rate. Pulmonary:      Effort: Pulmonary effort is normal.   Abdominal:      General: Abdomen is flat. There is no distension. Genitourinary:     Comments: Deferred  Musculoskeletal:         General: Normal range of motion. Cervical back: Normal range of motion. Skin:     General: Skin is warm and dry. Neurological:      General: No focal deficit present. Mental Status: He is alert. Psychiatric:         Mood and Affect: Mood normal.      RESULTS  Lab Results    Chemistry        Component Value Date/Time    K 4.3 11/30/2023 0638     11/30/2023 0638    CO2 20 11/30/2023 0638    BUN 15 11/30/2023 0638    CREATININE 0.78 11/30/2023 0638        Component Value Date/Time    AST 29 11/30/2023 0638    ALT 60 (H) 11/30/2023 0638            Lab Results   Component Value Date    WBC 6.1 11/30/2023    HGB 14.4 11/30/2023    HCT 41.9 11/30/2023    MCV 99 (H) 11/30/2023     11/30/2023         Imaging Studies  No results found. Pathology:See above. ASSESSMENT  1. Rectal adenocarcinoma [facility])  Ambulatory referral to Radiation Oncology        Cancer Staging   Rectal adenocarcinoma [facility])  Staging form: Colon and Rectum, AJCC 8th Edition  - Clinical stage from 9/14/2023: Stage IIIC (cT4b, cN1, cM0) - Signed by Brisa Parson MD on 9/14/2023  Stage prefix: Initial diagnosis  Histologic grade (G): GX  Histologic grading system: 4 grade system        PLAN/DISCUSSION  No orders of the defined types were placed in this encounter.          Judy Sheppard is a 66y.o. year old male with stage IIIC [id number] distal rectal adenocarcinoma and prostate involvement who presents for consideration of radiotherapy as a component of total neoadjuvant therapy. He is completing initial FOLFOX and has 2 cycles remaining. For patients with clinical stage III rectal cancer, there is strong evidence to recommend neoadjuvant RT. Multiple prospective trials have demonstrated that neoadjuvant RT decreases the risk of local recurrence, even in the era of total mesorectal excision (TME). These results were confirmed by several meta-analyses, which consistently found that the hazard ratio for local recurrence with RT was approximately 0.5 compared with surgery alone. Furthermore, three prospective trials randomizing patients between preoperative and postoperative chemoradiation demonstrated improvements in disease-free survival and/or local recurrence-free survival with a preoperative RT approach. One of these trials, the [place] rectal trial, also reported fewer acute and long-term toxicities in the patients treated with preoperative chemoradiation. A meta-analysis based on 5 RCTs revealed that while there is no improvement in survival, the addition of concurrent chemotherapy to conventionally fractionated RT increases the 5-year local control and pathologic complete response (pCR) rates compared with preoperative RT alone. For patients receiving conventionally fractionated treatment, after an initial 4500 cGy in 25 fractions delivered to the pelvis (given T4 status, external iliac jagdish regions will be included), a boost of 540 cGy in 3 fractions to the primary tumor plus margin is delivered to achieve 5040 cGy composite dose. I discussed the potential toxicities of radiotherapy to the pelvis.   These include but are not limited to fatigue, skin irritation/peeling, hair loss, nausea/vomiting, diarrhea, abdominal cramping or pain, cytopenias, bladder irritation, infertility, dysuria, sexual dysfunction, anejaculation, lower extremity swelling, radiation proctitis and secondary malignancy. He agreed to be proceed with preoperative chemoradiation. Informed consent was obtained today. CT simulation was scheduled for 1/8/24. Both simulation and all treatments will be delivered at the [address] [place] [address]. He will continue follow up with colorectal surgery and medical oncology. He was encouraged to call with questions or concerns in the interim. Ileana Monroy MD  12/13/2023,1:59 PM      Portions of the record may have been created with voice recognition software. Occasional wrong word or "sound a like" substitutions may have occurred due to the inherent limitations of voice recognition software. Read the chart carefully and recognize, using context, where substitutions have occurred.

## 2023-12-14 ENCOUNTER — TELEPHONE (OUTPATIENT)
Dept: NUTRITION | Facility: CLINIC | Age: 58
End: 2023-12-14

## 2023-12-14 LAB
BASOPHILS # BLD AUTO: 0 X10E3/UL (ref 0–0.2)
BASOPHILS NFR BLD AUTO: 1 %
EOSINOPHIL # BLD AUTO: 0.1 X10E3/UL (ref 0–0.4)
EOSINOPHIL NFR BLD AUTO: 1 %
ERYTHROCYTE [DISTWIDTH] IN BLOOD BY AUTOMATED COUNT: 16.6 % (ref 11.6–15.4)
HCT VFR BLD AUTO: 41.5 % (ref 37.5–51)
HGB BLD-MCNC: 14.2 G/DL (ref 13–17.7)
IMM GRANULOCYTES # BLD: 0.1 X10E3/UL (ref 0–0.1)
IMM GRANULOCYTES NFR BLD: 2 %
LYMPHOCYTES # BLD AUTO: 2.9 X10E3/UL (ref 0.7–3.1)
LYMPHOCYTES NFR BLD AUTO: 47 %
MCH RBC QN AUTO: 33.8 PG (ref 26.6–33)
MCHC RBC AUTO-ENTMCNC: 34.2 G/DL (ref 31.5–35.7)
MCV RBC AUTO: 99 FL (ref 79–97)
MONOCYTES # BLD AUTO: 0.7 X10E3/UL (ref 0.1–0.9)
MONOCYTES NFR BLD AUTO: 11 %
NEUTROPHILS # BLD AUTO: 2.3 X10E3/UL (ref 1.4–7)
NEUTROPHILS NFR BLD AUTO: 38 %
NRBC BLD AUTO-RTO: 1 % (ref 0–0)
PLATELET # BLD AUTO: 170 X10E3/UL (ref 150–450)
RBC # BLD AUTO: 4.2 X10E6/UL (ref 4.14–5.8)
WBC # BLD AUTO: 6.1 X10E3/UL (ref 3.4–10.8)

## 2023-12-14 NOTE — TELEPHONE ENCOUNTER
Received notification by Davina JOSE Morgan on 12/13/23 that pt has triggered for oncology nutrition care (reason for referral: Malnutrition Screening Tool (MST) Triggers: scored a 3 indicating 14-23# (6.4-10.5 kg) recent wt loss and is eating poorly due to a decreased appetite. (Date of MST: 12/13/23)). Pari Benton. Spoke with wife, and Aaron Terry was not available. Introduced self and explained the reason for today's call. Wife states that Remi's initial weight loss was prior to starting treatment. Since starting treatment she feels like his weight has stabilized. He is eating well at this time. Discussed oncology nutrition services available (options for in-person and phone consultation) and the benefits of meeting for a consultation. She did not think Aaron Terry would want or need to schedule an appointment with RD at this time, but they were willing to take RD's contact information down and will reach out if they want to schedule an appt in the near future. Provided this RD’s contact information asking that Aaron Terry or wife reach out prn. All questions/concerns addressed at this time.

## 2023-12-15 LAB
ALBUMIN SERPL-MCNC: 4.2 G/DL (ref 3.8–4.9)
ALBUMIN/GLOB SERPL: 2 {RATIO} (ref 1.2–2.2)
ALP SERPL-CCNC: 89 IU/L (ref 44–121)
ALT SERPL-CCNC: 81 IU/L (ref 0–44)
AST SERPL-CCNC: 53 IU/L (ref 0–40)
BILIRUB SERPL-MCNC: 0.7 MG/DL (ref 0–1.2)
BUN SERPL-MCNC: 13 MG/DL (ref 6–24)
BUN/CREAT SERPL: 16 (ref 9–20)
CALCIUM SERPL-MCNC: 9.1 MG/DL (ref 8.7–10.2)
CHLORIDE SERPL-SCNC: 103 MMOL/L (ref 96–106)
CO2 SERPL-SCNC: 23 MMOL/L (ref 20–29)
CREAT SERPL-MCNC: 0.79 MG/DL (ref 0.76–1.27)
EGFR: 104 ML/MIN/1.73
GLOBULIN SER-MCNC: 2.1 G/DL (ref 1.5–4.5)
GLUCOSE SERPL-MCNC: 206 MG/DL (ref 70–99)
POTASSIUM SERPL-SCNC: 4.6 MMOL/L (ref 3.5–5.2)
PROT SERPL-MCNC: 6.3 G/DL (ref 6–8.5)
SODIUM SERPL-SCNC: 140 MMOL/L (ref 134–144)

## 2023-12-18 ENCOUNTER — TELEPHONE (OUTPATIENT)
Dept: HEMATOLOGY ONCOLOGY | Facility: CLINIC | Age: 58
End: 2023-12-18

## 2023-12-18 ENCOUNTER — HOSPITAL ENCOUNTER (OUTPATIENT)
Dept: INFUSION CENTER | Facility: HOSPITAL | Age: 58
Discharge: HOME | End: 2023-12-18
Attending: INTERNAL MEDICINE
Payer: COMMERCIAL

## 2023-12-18 VITALS
RESPIRATION RATE: 16 BRPM | BODY MASS INDEX: 27.86 KG/M2 | HEIGHT: 72 IN | OXYGEN SATURATION: 95 % | TEMPERATURE: 97.1 F | HEART RATE: 55 BPM | SYSTOLIC BLOOD PRESSURE: 118 MMHG | WEIGHT: 205.69 LBS | DIASTOLIC BLOOD PRESSURE: 86 MMHG

## 2023-12-18 DIAGNOSIS — C20 RECTAL ADENOCARCINOMA (HCC): Primary | ICD-10-CM

## 2023-12-18 RX ORDER — SODIUM CHLORIDE 9 MG/ML
20 INJECTION, SOLUTION INTRAVENOUS ONCE AS NEEDED
Status: DISCONTINUED | OUTPATIENT
Start: 2023-12-18 | End: 2023-12-21 | Stop reason: HOSPADM

## 2023-12-18 RX ORDER — FLUOROURACIL 50 MG/ML
400 INJECTION, SOLUTION INTRAVENOUS ONCE
Status: COMPLETED | OUTPATIENT
Start: 2023-12-18 | End: 2023-12-18

## 2023-12-18 RX ORDER — DEXTROSE MONOHYDRATE 50 MG/ML
20 INJECTION, SOLUTION INTRAVENOUS ONCE
Status: COMPLETED | OUTPATIENT
Start: 2023-12-18 | End: 2023-12-18

## 2023-12-18 RX ADMIN — LEUCOVORIN CALCIUM 900 MG: 500 INJECTION, POWDER, LYOPHILIZED, FOR SOLUTION INTRAMUSCULAR; INTRAVENOUS at 09:35

## 2023-12-18 RX ADMIN — FLUOROURACIL 875 MG: 50 INJECTION, SOLUTION INTRAVENOUS at 11:41

## 2023-12-18 RX ADMIN — DEXTROSE 20 ML/HR: 5 SOLUTION INTRAVENOUS at 08:59

## 2023-12-18 RX ADMIN — DEXAMETHASONE SODIUM PHOSPHATE: 10 INJECTION, SOLUTION INTRAMUSCULAR; INTRAVENOUS at 08:35

## 2023-12-18 RX ADMIN — OXALIPLATIN 186.15 MG: 100 INJECTION, SOLUTION, CONCENTRATE INTRAVENOUS at 09:37

## 2023-12-18 RX ADMIN — SODIUM CHLORIDE 20 ML/HR: 0.9 INJECTION, SOLUTION INTRAVENOUS at 08:34

## 2023-12-18 NOTE — TELEPHONE ENCOUNTER
I called Remi regarding an appointment that they have scheduled with Dr. Carrillo scheduled on  01/12/24      I left a voicemail explaining to patient that this appointment will need to be rescheduled due to a change in the providers schedule. Patient was advised to call [facility] to reschedule.  Another attempt will be made to reschedule

## 2023-12-18 NOTE — PROGRESS NOTES
..Remi Olivares  tolerated treatment well with no complications.      Remi Olivares is aware of future appt on 12/20 at 1000.     AVS printed and given to Remi Olivares: No (Declined by Remi Olivares)

## 2023-12-18 NOTE — TELEPHONE ENCOUNTER
Appointment Change  Cancel, Reschedule, Change to Virtual      Who are you speaking with? Spouse   If it is not the patient, is the caller listed on the communication consent form? Yes   Which provider is the appointment scheduled with? Dr. Carrillo   When was the original appointment scheduled?    Please list date and time 1/12   At which location is the appointment scheduled to take place? [place]   Was the appointment rescheduled?     Was the appointment changed from an in person visit to a virtual visit?    If so, please list the details of the change. 2/12 4pm   What is the reason for the appointment change? Provider away       Was [facility] transport scheduled? No   Does [facility] transport need to be scheduled for the new visit (if applicable) No   Does the patient need an infusion appointment rescheduled? No   Does the patient have an upcoming infusion appointment scheduled? If so, when? 12/20   Is the patient undergoing chemotherapy? Yes   For appointments cancelled with less than 24 hours:  Was the no-show policy reviewed? Yes

## 2023-12-20 ENCOUNTER — HOSPITAL ENCOUNTER (OUTPATIENT)
Dept: INFUSION CENTER | Facility: HOSPITAL | Age: 58
Discharge: HOME | End: 2023-12-20
Attending: INTERNAL MEDICINE
Payer: COMMERCIAL

## 2023-12-20 DIAGNOSIS — C20 RECTAL ADENOCARCINOMA (HCC): Primary | ICD-10-CM

## 2023-12-20 PROCEDURE — 96365 THER/PROPH/DIAG IV INF INIT: CPT

## 2023-12-20 RX ADMIN — ONDANSETRON 8 MG: 2 INJECTION INTRAMUSCULAR; INTRAVENOUS at 10:36

## 2023-12-20 NOTE — PROGRESS NOTES
Pt here for pump d/c; pt vomiting and unable to keep prn antinausea meds down; IV Zofran ordered by Lauren Brennan RN/ Dr. Carrillo; IV Zofran currently infusion; will cont to monitor.

## 2023-12-21 ENCOUNTER — PATIENT OUTREACH (OUTPATIENT)
Dept: HEMATOLOGY ONCOLOGY | Facility: CLINIC | Age: 58
End: 2023-12-21

## 2023-12-21 NOTE — PROGRESS NOTES
Called to check in and see how the pts doing, and to discuss end of treatment image scheduling. No answer, left a message with my contact information for him to call me back

## 2023-12-22 DIAGNOSIS — R11.0 CHEMOTHERAPY-INDUCED NAUSEA: ICD-10-CM

## 2023-12-22 DIAGNOSIS — T45.1X5A CHEMOTHERAPY-INDUCED NAUSEA: ICD-10-CM

## 2023-12-22 RX ORDER — PROCHLORPERAZINE MALEATE 10 MG
10 TABLET ORAL EVERY 6 HOURS PRN
Qty: 30 TABLET | Refills: 0 | Status: SHIPPED | OUTPATIENT
Start: 2023-12-22

## 2023-12-26 DIAGNOSIS — C20 RECTAL ADENOCARCINOMA (HCC): Primary | ICD-10-CM

## 2023-12-26 RX ORDER — DEXTROSE MONOHYDRATE 50 MG/ML
20 INJECTION, SOLUTION INTRAVENOUS ONCE
Status: CANCELLED | OUTPATIENT
Start: 2024-01-02

## 2023-12-26 RX ORDER — FLUOROURACIL 50 MG/ML
400 INJECTION, SOLUTION INTRAVENOUS ONCE
Status: CANCELLED | OUTPATIENT
Start: 2024-01-02

## 2023-12-26 RX ORDER — SODIUM CHLORIDE 9 MG/ML
20 INJECTION, SOLUTION INTRAVENOUS ONCE AS NEEDED
Status: CANCELLED | OUTPATIENT
Start: 2024-01-02

## 2024-01-02 ENCOUNTER — HOSPITAL ENCOUNTER (OUTPATIENT)
Dept: INFUSION CENTER | Facility: HOSPITAL | Age: 59
Discharge: HOME | End: 2024-01-02
Attending: INTERNAL MEDICINE
Payer: COMMERCIAL

## 2024-01-02 VITALS
DIASTOLIC BLOOD PRESSURE: 86 MMHG | HEART RATE: 95 BPM | OXYGEN SATURATION: 97 % | TEMPERATURE: 97.3 F | WEIGHT: 202.82 LBS | SYSTOLIC BLOOD PRESSURE: 129 MMHG | RESPIRATION RATE: 16 BRPM | HEIGHT: 72 IN | BODY MASS INDEX: 27.47 KG/M2

## 2024-01-02 DIAGNOSIS — C20 RECTAL ADENOCARCINOMA (HCC): Primary | ICD-10-CM

## 2024-01-02 RX ORDER — FLUOROURACIL 50 MG/ML
400 INJECTION, SOLUTION INTRAVENOUS ONCE
Status: COMPLETED | OUTPATIENT
Start: 2024-01-02 | End: 2024-01-02

## 2024-01-02 RX ORDER — SODIUM CHLORIDE 9 MG/ML
20 INJECTION, SOLUTION INTRAVENOUS ONCE AS NEEDED
Status: DISCONTINUED | OUTPATIENT
Start: 2024-01-02 | End: 2024-01-05 | Stop reason: HOSPADM

## 2024-01-02 RX ORDER — DEXTROSE MONOHYDRATE 50 MG/ML
20 INJECTION, SOLUTION INTRAVENOUS ONCE
Status: COMPLETED | OUTPATIENT
Start: 2024-01-02 | End: 2024-01-02

## 2024-01-02 RX ADMIN — DEXAMETHASONE SODIUM PHOSPHATE: 10 INJECTION, SOLUTION INTRAMUSCULAR; INTRAVENOUS at 08:23

## 2024-01-02 RX ADMIN — DEXTROSE 20 ML/HR: 5 SOLUTION INTRAVENOUS at 08:51

## 2024-01-02 RX ADMIN — FLUOROURACIL 875 MG: 50 INJECTION, SOLUTION INTRAVENOUS at 11:21

## 2024-01-02 RX ADMIN — SODIUM CHLORIDE 20 ML/HR: 9 INJECTION, SOLUTION INTRAVENOUS at 08:23

## 2024-01-02 RX ADMIN — OXALIPLATIN 186.15 MG: 100 INJECTION, SOLUTION, CONCENTRATE INTRAVENOUS at 09:10

## 2024-01-02 RX ADMIN — LEUCOVORIN CALCIUM 900 MG: 200 INJECTION, POWDER, LYOPHILIZED, FOR SUSPENSION INTRAMUSCULAR; INTRAVENOUS at 09:09

## 2024-01-02 NOTE — PROGRESS NOTES
Remi Olivares  tolerated treatment well with no complications.      Remi Olivares is aware of future appt on 1/4/24 at 0930.     AVS printed and given to Remi Olivares:  No (Declined by Remi Olivares)

## 2024-01-03 PROCEDURE — 77263 THER RADIOLOGY TX PLNG CPLX: CPT | Performed by: RADIOLOGY

## 2024-01-04 ENCOUNTER — TELEPHONE (OUTPATIENT)
Facility: HOSPITAL | Age: 59
End: 2024-01-04

## 2024-01-04 ENCOUNTER — HOSPITAL ENCOUNTER (OUTPATIENT)
Dept: INFUSION CENTER | Facility: HOSPITAL | Age: 59
Discharge: HOME | End: 2024-01-04
Attending: INTERNAL MEDICINE

## 2024-01-04 DIAGNOSIS — C20 RECTAL ADENOCARCINOMA (HCC): Primary | ICD-10-CM

## 2024-01-04 NOTE — PROGRESS NOTES
Rec'd pt for elastomeric pump removal. Port also deaccessed per protocol. Pt offered no new complaints. AVS declined, next 2 appts were reviewed and confirmed. Discharged  with steady gait.

## 2024-01-04 NOTE — TELEPHONE ENCOUNTER
Called patient to review contrast screening form for simulation planning on Monday 1/8/24 for radiation therapy. Reviewed contrast screening form with wife Nikia as she is an approved contact for the patient. She denies having any questions at this time. Was appreciative of call.

## 2024-01-05 ENCOUNTER — PATIENT OUTREACH (OUTPATIENT)
Dept: HEMATOLOGY ONCOLOGY | Facility: CLINIC | Age: 59
End: 2024-01-05

## 2024-01-05 NOTE — PROGRESS NOTES
Pts wife Nikia called stating the radiation simulation scheduled for Monday 1/8  has been pushed back to the afternoon, because the insurance approval has not been obtained yet.  I reassured her that the radiation team will do their best to obtain authorization, and once all is complete a schedule will be finalized.  I will also be in touch with her once we have an end of treatment date to discuss scheduling end of treatment imaging

## 2024-01-08 ENCOUNTER — RADIATION THERAPY TREATMENT (OUTPATIENT)
Facility: HOSPITAL | Age: 59
End: 2024-01-08
Payer: COMMERCIAL

## 2024-01-08 ENCOUNTER — APPOINTMENT (OUTPATIENT)
Facility: HOSPITAL | Age: 59
End: 2024-01-08
Payer: COMMERCIAL

## 2024-01-08 ENCOUNTER — TELEPHONE (OUTPATIENT)
Dept: HEMATOLOGY ONCOLOGY | Facility: CLINIC | Age: 59
End: 2024-01-08

## 2024-01-08 DIAGNOSIS — C20 RECTAL ADENOCARCINOMA (HCC): Primary | ICD-10-CM

## 2024-01-08 PROCEDURE — 77470 SPECIAL RADIATION TREATMENT: CPT | Performed by: RADIOLOGY

## 2024-01-08 PROCEDURE — 77334 RADIATION TREATMENT AID(S): CPT | Performed by: RADIOLOGY

## 2024-01-08 NOTE — TELEPHONE ENCOUNTER
Patient is starting radiation on 1/17  Please schedule patient's treatment to start next week    Thanks!

## 2024-01-09 ENCOUNTER — PATIENT OUTREACH (OUTPATIENT)
Dept: HEMATOLOGY ONCOLOGY | Facility: CLINIC | Age: 59
End: 2024-01-09

## 2024-01-09 DIAGNOSIS — C20 RECTAL ADENOCARCINOMA (HCC): Primary | ICD-10-CM

## 2024-01-09 NOTE — PROGRESS NOTES
Spoke with pts wife Nikia, she stated all went well with the radiation simulation yesterday. She stated all the infusion appointments are scheduled. She is aware the radiation appointments will be scheduled after his first treatment 1/17/2024. We discussed the time frame of the post treatment imaging, she stated Remi prefers to have all imaging done at [facility].  Once the end of treatment date is available, I will schedule the imaging and discuss with Nikia. She understood, and was thankful for the call

## 2024-01-10 DIAGNOSIS — G89.3 CANCER RELATED PAIN: ICD-10-CM

## 2024-01-10 DIAGNOSIS — R11.0 CHEMOTHERAPY-INDUCED NAUSEA: ICD-10-CM

## 2024-01-10 DIAGNOSIS — T45.1X5A CHEMOTHERAPY-INDUCED NAUSEA: ICD-10-CM

## 2024-01-10 RX ORDER — ONDANSETRON HYDROCHLORIDE 8 MG/1
TABLET, FILM COATED ORAL
Qty: 20 TABLET | Refills: 0 | Status: SHIPPED | OUTPATIENT
Start: 2024-01-10

## 2024-01-10 RX ORDER — PROCHLORPERAZINE MALEATE 10 MG
10 TABLET ORAL EVERY 6 HOURS PRN
Qty: 30 TABLET | Refills: 0 | Status: SHIPPED | OUTPATIENT
Start: 2024-01-10

## 2024-01-10 RX ORDER — TRAMADOL HYDROCHLORIDE 50 MG/1
50 TABLET ORAL EVERY 4 HOURS PRN
Qty: 120 TABLET | Refills: 0 | Status: SHIPPED | OUTPATIENT
Start: 2024-01-10

## 2024-01-13 LAB
ALBUMIN SERPL-MCNC: 4 G/DL (ref 3.8–4.9)
ALBUMIN/GLOB SERPL: 2.4 {RATIO} (ref 1.2–2.2)
ALP SERPL-CCNC: 104 IU/L (ref 44–121)
ALT SERPL-CCNC: 64 IU/L (ref 0–44)
AST SERPL-CCNC: 32 IU/L (ref 0–40)
BASOPHILS # BLD AUTO: 0 X10E3/UL (ref 0–0.2)
BASOPHILS NFR BLD AUTO: 1 %
BILIRUB SERPL-MCNC: 0.5 MG/DL (ref 0–1.2)
BUN SERPL-MCNC: 13 MG/DL (ref 6–24)
BUN/CREAT SERPL: 18 (ref 9–20)
CALCIUM SERPL-MCNC: 9.1 MG/DL (ref 8.7–10.2)
CHLORIDE SERPL-SCNC: 104 MMOL/L (ref 96–106)
CO2 SERPL-SCNC: 22 MMOL/L (ref 20–29)
CREAT SERPL-MCNC: 0.71 MG/DL (ref 0.76–1.27)
EGFR: 108 ML/MIN/1.73
EOSINOPHIL # BLD AUTO: 0.1 X10E3/UL (ref 0–0.4)
EOSINOPHIL NFR BLD AUTO: 2 %
ERYTHROCYTE [DISTWIDTH] IN BLOOD BY AUTOMATED COUNT: 16.4 % (ref 11.6–15.4)
GLOBULIN SER-MCNC: 1.7 G/DL (ref 1.5–4.5)
GLUCOSE SERPL-MCNC: 242 MG/DL (ref 70–99)
HCT VFR BLD AUTO: 38 % (ref 37.5–51)
HGB BLD-MCNC: 13.2 G/DL (ref 13–17.7)
IMM GRANULOCYTES # BLD: 0 X10E3/UL (ref 0–0.1)
IMM GRANULOCYTES NFR BLD: 1 %
LYMPHOCYTES # BLD AUTO: 2 X10E3/UL (ref 0.7–3.1)
LYMPHOCYTES NFR BLD AUTO: 51 %
MCH RBC QN AUTO: 35.1 PG (ref 26.6–33)
MCHC RBC AUTO-ENTMCNC: 34.7 G/DL (ref 31.5–35.7)
MCV RBC AUTO: 101 FL (ref 79–97)
MONOCYTES # BLD AUTO: 0.4 X10E3/UL (ref 0.1–0.9)
MONOCYTES NFR BLD AUTO: 9 %
NEUTROPHILS # BLD AUTO: 1.4 X10E3/UL (ref 1.4–7)
NEUTROPHILS NFR BLD AUTO: 36 %
NRBC BLD AUTO-RTO: 1 % (ref 0–0)
PLATELET # BLD AUTO: 113 X10E3/UL (ref 150–450)
POTASSIUM SERPL-SCNC: 4 MMOL/L (ref 3.5–5.2)
PROT SERPL-MCNC: 5.7 G/DL (ref 6–8.5)
RBC # BLD AUTO: 3.76 X10E6/UL (ref 4.14–5.8)
SODIUM SERPL-SCNC: 141 MMOL/L (ref 134–144)
WBC # BLD AUTO: 3.8 X10E3/UL (ref 3.4–10.8)

## 2024-01-15 ENCOUNTER — HOSPITAL ENCOUNTER (OUTPATIENT)
Dept: INFUSION CENTER | Facility: HOSPITAL | Age: 59
Discharge: HOME | End: 2024-01-15
Attending: INTERNAL MEDICINE
Payer: COMMERCIAL

## 2024-01-15 VITALS
BODY MASS INDEX: 26.76 KG/M2 | RESPIRATION RATE: 16 BRPM | HEART RATE: 97 BPM | HEIGHT: 72 IN | DIASTOLIC BLOOD PRESSURE: 76 MMHG | WEIGHT: 197.6 LBS | TEMPERATURE: 97.5 F | SYSTOLIC BLOOD PRESSURE: 123 MMHG

## 2024-01-15 DIAGNOSIS — C20 RECTAL ADENOCARCINOMA (HCC): Primary | ICD-10-CM

## 2024-01-15 PROCEDURE — G0498 CHEMO EXTEND IV INFUS W/PUMP: HCPCS

## 2024-01-15 NOTE — PROGRESS NOTES
Patient in infusion center today for 5FU CADD connection.  VSS.  Pt without concerns at this time.  Patient educated on screen of CADD PEÑALOZA and extra batteries available in bag.  [facility] home infusion notified and will send additional education materials via email to patient.  Email verified.  Patient gave verbal understanding of all information provided.  Patient then d/c'd home ambulatory with steady gait.        Remirashid Olivares  tolerated treatment well with no complications.      Remi Olivares is aware of future appt on 1/19/24 at 1500.

## 2024-01-16 ENCOUNTER — APPOINTMENT (OUTPATIENT)
Facility: HOSPITAL | Age: 59
End: 2024-01-16
Attending: RADIOLOGY
Payer: COMMERCIAL

## 2024-01-16 PROCEDURE — 77338 DESIGN MLC DEVICE FOR IMRT: CPT | Performed by: RADIOLOGY

## 2024-01-16 PROCEDURE — 77300 RADIATION THERAPY DOSE PLAN: CPT | Performed by: RADIOLOGY

## 2024-01-16 PROCEDURE — 77386 HB NTSTY MODUL RAD TX DLVR CPLX: CPT | Performed by: RADIOLOGY

## 2024-01-16 PROCEDURE — 77301 RADIOTHERAPY DOSE PLAN IMRT: CPT | Performed by: RADIOLOGY

## 2024-01-16 PROCEDURE — 77427 RADIATION TX MANAGEMENT X5: CPT | Performed by: RADIOLOGY

## 2024-01-17 ENCOUNTER — PATIENT OUTREACH (OUTPATIENT)
Dept: HEMATOLOGY ONCOLOGY | Facility: CLINIC | Age: 59
End: 2024-01-17

## 2024-01-17 ENCOUNTER — APPOINTMENT (OUTPATIENT)
Facility: HOSPITAL | Age: 59
End: 2024-01-17
Payer: COMMERCIAL

## 2024-01-17 DIAGNOSIS — C20 RECTAL ADENOCARCINOMA (HCC): Primary | ICD-10-CM

## 2024-01-17 PROCEDURE — 77014 CHG CT GUIDANCE RADIATION THERAPY FLDS PLACEMENT: CPT | Performed by: RADIOLOGY

## 2024-01-17 PROCEDURE — 77386 HB NTSTY MODUL RAD TX DLVR CPLX: CPT | Performed by: RADIOLOGY

## 2024-01-17 NOTE — PROGRESS NOTES
NN phone outreach to the primary contact, no answer, LVM, stated my name title and reason for call- discuss upcoming scans that will be sched. Left my contact number and waiting for call back.

## 2024-01-18 PROBLEM — Z95.828 PORT-A-CATH IN PLACE: Status: ACTIVE | Noted: 2024-01-18

## 2024-01-18 PROCEDURE — 77386 HB NTSTY MODUL RAD TX DLVR CPLX: CPT | Performed by: RADIOLOGY

## 2024-01-18 PROCEDURE — 77014 CHG CT GUIDANCE RADIATION THERAPY FLDS PLACEMENT: CPT | Performed by: RADIOLOGY

## 2024-01-19 ENCOUNTER — HOSPITAL ENCOUNTER (OUTPATIENT)
Dept: INFUSION CENTER | Facility: HOSPITAL | Age: 59
End: 2024-01-19
Attending: INTERNAL MEDICINE
Payer: COMMERCIAL

## 2024-01-19 ENCOUNTER — DOCUMENTATION (OUTPATIENT)
Dept: HEMATOLOGY ONCOLOGY | Facility: CLINIC | Age: 59
End: 2024-01-19

## 2024-01-19 ENCOUNTER — PATIENT OUTREACH (OUTPATIENT)
Dept: HEMATOLOGY ONCOLOGY | Facility: CLINIC | Age: 59
End: 2024-01-19

## 2024-01-19 DIAGNOSIS — C20 RECTAL ADENOCARCINOMA (HCC): Primary | ICD-10-CM

## 2024-01-19 DIAGNOSIS — T45.1X5A CHEMOTHERAPY INDUCED NEUTROPENIA: ICD-10-CM

## 2024-01-19 DIAGNOSIS — D70.1 CHEMOTHERAPY INDUCED NEUTROPENIA: ICD-10-CM

## 2024-01-19 LAB
ALBUMIN SERPL BCP-MCNC: 4 G/DL (ref 3.5–5)
ALP SERPL-CCNC: 80 U/L (ref 34–104)
ALT SERPL W P-5'-P-CCNC: 57 U/L (ref 7–52)
ANION GAP SERPL CALCULATED.3IONS-SCNC: 8 MMOL/L
ANISOCYTOSIS BLD QL SMEAR: PRESENT
AST SERPL W P-5'-P-CCNC: 27 U/L (ref 13–39)
BASOPHILS # BLD MANUAL: 0 THOUSAND/UL (ref 0–0.1)
BASOPHILS NFR MAR MANUAL: 0 % (ref 0–1)
BILIRUB SERPL-MCNC: 1.05 MG/DL (ref 0.2–1)
BUN SERPL-MCNC: 26 MG/DL (ref 5–25)
CALCIUM SERPL-MCNC: 9.4 MG/DL (ref 8.4–10.2)
CEA SERPL-MCNC: 4.1 NG/ML (ref 0–3)
CHLORIDE SERPL-SCNC: 99 MMOL/L (ref 96–108)
CO2 SERPL-SCNC: 25 MMOL/L (ref 21–32)
CREAT SERPL-MCNC: 0.8 MG/DL (ref 0.6–1.3)
EOSINOPHIL # BLD MANUAL: 0.09 THOUSAND/UL (ref 0–0.4)
EOSINOPHIL NFR BLD MANUAL: 2 % (ref 0–6)
ERYTHROCYTE [DISTWIDTH] IN BLOOD BY AUTOMATED COUNT: 15.3 % (ref 11.6–15.1)
GFR SERPL CREATININE-BSD FRML MDRD: 99 ML/MIN/1.73SQ M
GLUCOSE SERPL-MCNC: 228 MG/DL (ref 65–140)
HCT VFR BLD AUTO: 37.8 % (ref 36.5–49.3)
HGB BLD-MCNC: 12.7 G/DL (ref 12–17)
LYMPHOCYTES # BLD AUTO: 2.47 THOUSAND/UL (ref 0.6–4.47)
LYMPHOCYTES # BLD AUTO: 55 % (ref 14–44)
MACROCYTES BLD QL AUTO: PRESENT
MCH RBC QN AUTO: 34.6 PG (ref 26.8–34.3)
MCHC RBC AUTO-ENTMCNC: 33.6 G/DL (ref 31.4–37.4)
MCV RBC AUTO: 103 FL (ref 82–98)
MONOCYTES # BLD AUTO: 0.64 THOUSAND/UL (ref 0–1.22)
MONOCYTES NFR BLD: 15 % (ref 4–12)
NEUTROPHILS # BLD MANUAL: 1.07 THOUSAND/UL (ref 1.85–7.62)
NEUTS SEG NFR BLD AUTO: 25 % (ref 43–75)
NRBC BLD AUTO-RTO: 1 /100 WBC (ref 0–2)
PLATELET # BLD AUTO: 260 THOUSANDS/UL (ref 149–390)
PLATELET BLD QL SMEAR: ADEQUATE
PMV BLD AUTO: 8.9 FL (ref 8.9–12.7)
POLYCHROMASIA BLD QL SMEAR: PRESENT
POTASSIUM SERPL-SCNC: 4.5 MMOL/L (ref 3.5–5.3)
PROT SERPL-MCNC: 6.7 G/DL (ref 6.4–8.4)
RBC # BLD AUTO: 3.67 MILLION/UL (ref 3.88–5.62)
RBC MORPH BLD: PRESENT
SODIUM SERPL-SCNC: 132 MMOL/L (ref 135–147)
VARIANT LYMPHS # BLD AUTO: 3 %
WBC # BLD AUTO: 4.26 THOUSAND/UL (ref 4.31–10.16)

## 2024-01-19 PROCEDURE — 80053 COMPREHEN METABOLIC PANEL: CPT | Performed by: INTERNAL MEDICINE

## 2024-01-19 PROCEDURE — 77014 CHG CT GUIDANCE RADIATION THERAPY FLDS PLACEMENT: CPT | Performed by: RADIOLOGY

## 2024-01-19 PROCEDURE — 85027 COMPLETE CBC AUTOMATED: CPT | Performed by: INTERNAL MEDICINE

## 2024-01-19 PROCEDURE — 77386 HB NTSTY MODUL RAD TX DLVR CPLX: CPT | Performed by: RADIOLOGY

## 2024-01-19 PROCEDURE — 85007 BL SMEAR W/DIFF WBC COUNT: CPT | Performed by: INTERNAL MEDICINE

## 2024-01-19 PROCEDURE — 82378 CARCINOEMBRYONIC ANTIGEN: CPT

## 2024-01-19 NOTE — PROGRESS NOTES
Rqst sent to the GI onc TB pool to add this pt on for 4/11/24 lower meeting as PRE OP rectal to be discussed by Dr. Aguilar.

## 2024-01-19 NOTE — PROGRESS NOTES
Scheduled end of treatment CT,MRI and Dr Aguilar follow up , COLIN Friend will give the pt details via phone call

## 2024-01-19 NOTE — PROGRESS NOTES
Incoming call rcvd from the pt's wife, we rvwd the scheduled scans and PRE OP appt the pt has set up w Dr. Aguilar. I explained to the pt's wife that we had sched another CT and MRI following his EOT at the 3rd and 6th wk, explained to her the reason for the imaging was to make sure there remains no evidence of spread of the CA and the MRI to help us see if there was a complete vs incomplete response to the therapy. I told her despite the response to the tx and results on MRI the standard of care in the [place] is still that pt's have sx and would be recommended to him regardless, this is why the MRI is sched after the PRE OP visit, and that it is not of concern. The visit w the surgeon is sched at the 4 wk bruno for scheduling purposes and OR time. She understood. I provided her w the dates for the pt's scans:    CT CAP w contrast sched on 3/14/24 @ [facility] 0730   Appt w Dr. Aguilar on 3/29/24 @ [facility] 240 PM   MRI pelvis rectal cancer staging on 4/4/24 @[facility] 8AM    Explained the pt should be there about 15 min prior to the sched time. All prep instructions were rvwd and she was told the pt will need the contrast bottles again to pick those up beforehand. She said after rvw she is remembering when they went the first time.     She asked about the surgery and if pt would need a colostomy, I told her that ultimately what type of surgery that is done will be decided at the pre-op appt w the pt but if there is a complete response this would be more manageable to the surgeon as opposed to if there is still tumor present, that is the reason for the chemo/RT beforehand. Explained to her as well that if there is a need for a colostomy if there is a way that the surgeon is able to reverse they will do all they can to make that happen for the pt and this too would be discussed at the appt. She understood. She complimented the cancer care the pt has rcvd at  and she has been impressed by how well we have helped the pt through this.  She has my contact number if she needs anything additional.

## 2024-01-19 NOTE — PROGRESS NOTES
Remi Olivares  tolerated treatment well with no complications.      Remi Olivares is aware of future appt on 1/22/2024 at 08:00 AM.     AVS printed and given to Remi Olivares:  No (Declined by Remi Olivares)

## 2024-01-22 ENCOUNTER — APPOINTMENT (OUTPATIENT)
Facility: HOSPITAL | Age: 59
End: 2024-01-22
Payer: COMMERCIAL

## 2024-01-22 ENCOUNTER — HOSPITAL ENCOUNTER (OUTPATIENT)
Dept: INFUSION CENTER | Facility: HOSPITAL | Age: 59
Discharge: HOME | End: 2024-01-22
Attending: INTERNAL MEDICINE

## 2024-01-22 VITALS
OXYGEN SATURATION: 96 % | TEMPERATURE: 97 F | DIASTOLIC BLOOD PRESSURE: 69 MMHG | BODY MASS INDEX: 26.73 KG/M2 | SYSTOLIC BLOOD PRESSURE: 133 MMHG | HEIGHT: 72 IN | WEIGHT: 197.31 LBS | RESPIRATION RATE: 16 BRPM | HEART RATE: 97 BPM

## 2024-01-22 DIAGNOSIS — C20 RECTAL ADENOCARCINOMA (HCC): Primary | ICD-10-CM

## 2024-01-22 DIAGNOSIS — C20 RECTAL ADENOCARCINOMA (HCC): ICD-10-CM

## 2024-01-22 DIAGNOSIS — T45.1X5A CHEMOTHERAPY INDUCED NEUTROPENIA: Primary | ICD-10-CM

## 2024-01-22 DIAGNOSIS — D70.1 CHEMOTHERAPY INDUCED NEUTROPENIA: Primary | ICD-10-CM

## 2024-01-22 PROCEDURE — 77336 RADIATION PHYSICS CONSULT: CPT | Performed by: RADIOLOGY

## 2024-01-22 PROCEDURE — 77386 HB NTSTY MODUL RAD TX DLVR CPLX: CPT | Performed by: RADIOLOGY

## 2024-01-22 NOTE — PROGRESS NOTES
Patient arrived for CADD connect. ANC was 1.07, bili jumped to 1.05. Additionally his sodium is mildly low at 132. Gerardo's office contacted. Per JOSE Aguilar decided to defer CADD connect, redraw labs on 1/26, and okay to continue radiation. Patient made aware. Patient confirmed next appointment Friday and discharged to radiation in stable condition via ambulation.

## 2024-01-23 PROCEDURE — 77386 HB NTSTY MODUL RAD TX DLVR CPLX: CPT | Performed by: RADIOLOGY

## 2024-01-23 PROCEDURE — 77014 CHG CT GUIDANCE RADIATION THERAPY FLDS PLACEMENT: CPT | Performed by: RADIOLOGY

## 2024-01-23 PROCEDURE — 77427 RADIATION TX MANAGEMENT X5: CPT | Performed by: RADIOLOGY

## 2024-01-24 PROCEDURE — 77386 HB NTSTY MODUL RAD TX DLVR CPLX: CPT | Performed by: RADIOLOGY

## 2024-01-24 PROCEDURE — 77014 CHG CT GUIDANCE RADIATION THERAPY FLDS PLACEMENT: CPT | Performed by: RADIOLOGY

## 2024-01-25 PROCEDURE — 77014 CHG CT GUIDANCE RADIATION THERAPY FLDS PLACEMENT: CPT | Performed by: RADIOLOGY

## 2024-01-25 PROCEDURE — 77386 HB NTSTY MODUL RAD TX DLVR CPLX: CPT | Performed by: RADIOLOGY

## 2024-01-26 ENCOUNTER — HOSPITAL ENCOUNTER (OUTPATIENT)
Dept: INFUSION CENTER | Facility: HOSPITAL | Age: 59
Discharge: HOME | End: 2024-01-26
Attending: INTERNAL MEDICINE

## 2024-01-26 ENCOUNTER — HOSPITAL ENCOUNTER (OUTPATIENT)
Dept: INFUSION CENTER | Facility: HOSPITAL | Age: 59
End: 2024-01-26
Payer: COMMERCIAL

## 2024-01-26 DIAGNOSIS — T45.1X5A CHEMOTHERAPY INDUCED NEUTROPENIA: ICD-10-CM

## 2024-01-26 DIAGNOSIS — D70.1 CHEMOTHERAPY INDUCED NEUTROPENIA: ICD-10-CM

## 2024-01-26 DIAGNOSIS — Z95.828 PORT-A-CATH IN PLACE: Primary | ICD-10-CM

## 2024-01-26 DIAGNOSIS — C20 RECTAL ADENOCARCINOMA (HCC): ICD-10-CM

## 2024-01-26 LAB
ALBUMIN SERPL BCP-MCNC: 3.8 G/DL (ref 3.5–5)
ALP SERPL-CCNC: 80 U/L (ref 34–104)
ALT SERPL W P-5'-P-CCNC: 36 U/L (ref 7–52)
ANION GAP SERPL CALCULATED.3IONS-SCNC: 7 MMOL/L
AST SERPL W P-5'-P-CCNC: 24 U/L (ref 13–39)
BASOPHILS # BLD AUTO: 0.03 THOUSANDS/ÂΜL (ref 0–0.1)
BASOPHILS NFR BLD AUTO: 1 % (ref 0–1)
BILIRUB SERPL-MCNC: 0.7 MG/DL (ref 0.2–1)
BUN SERPL-MCNC: 17 MG/DL (ref 5–25)
CALCIUM SERPL-MCNC: 9.3 MG/DL (ref 8.4–10.2)
CHLORIDE SERPL-SCNC: 101 MMOL/L (ref 96–108)
CO2 SERPL-SCNC: 25 MMOL/L (ref 21–32)
CREAT SERPL-MCNC: 0.65 MG/DL (ref 0.6–1.3)
EOSINOPHIL # BLD AUTO: 0.05 THOUSAND/ÂΜL (ref 0–0.61)
EOSINOPHIL NFR BLD AUTO: 1 % (ref 0–6)
ERYTHROCYTE [DISTWIDTH] IN BLOOD BY AUTOMATED COUNT: 16.4 % (ref 11.6–15.1)
GFR SERPL CREATININE-BSD FRML MDRD: 108 ML/MIN/1.73SQ M
GLUCOSE SERPL-MCNC: 163 MG/DL (ref 65–140)
HCT VFR BLD AUTO: 35.2 % (ref 36.5–49.3)
HGB BLD-MCNC: 11.8 G/DL (ref 12–17)
IMM GRANULOCYTES # BLD AUTO: 0.08 THOUSAND/UL (ref 0–0.2)
IMM GRANULOCYTES NFR BLD AUTO: 2 % (ref 0–2)
LYMPHOCYTES # BLD AUTO: 1.28 THOUSANDS/ÂΜL (ref 0.6–4.47)
LYMPHOCYTES NFR BLD AUTO: 27 % (ref 14–44)
MCH RBC QN AUTO: 35.4 PG (ref 26.8–34.3)
MCHC RBC AUTO-ENTMCNC: 33.5 G/DL (ref 31.4–37.4)
MCV RBC AUTO: 106 FL (ref 82–98)
MONOCYTES # BLD AUTO: 0.67 THOUSAND/ÂΜL (ref 0.17–1.22)
MONOCYTES NFR BLD AUTO: 14 % (ref 4–12)
NEUTROPHILS # BLD AUTO: 2.69 THOUSANDS/ÂΜL (ref 1.85–7.62)
NEUTS SEG NFR BLD AUTO: 55 % (ref 43–75)
NRBC BLD AUTO-RTO: 0 /100 WBCS
PLATELET # BLD AUTO: 216 THOUSANDS/UL (ref 149–390)
PMV BLD AUTO: 8.3 FL (ref 8.9–12.7)
POTASSIUM SERPL-SCNC: 3.8 MMOL/L (ref 3.5–5.3)
PROT SERPL-MCNC: 6.7 G/DL (ref 6.4–8.4)
RBC # BLD AUTO: 3.33 MILLION/UL (ref 3.88–5.62)
SODIUM SERPL-SCNC: 133 MMOL/L (ref 135–147)
WBC # BLD AUTO: 4.8 THOUSAND/UL (ref 4.31–10.16)

## 2024-01-26 PROCEDURE — 85025 COMPLETE CBC W/AUTO DIFF WBC: CPT | Performed by: INTERNAL MEDICINE

## 2024-01-26 PROCEDURE — 77014 CHG CT GUIDANCE RADIATION THERAPY FLDS PLACEMENT: CPT | Performed by: RADIOLOGY

## 2024-01-26 PROCEDURE — 77386 HB NTSTY MODUL RAD TX DLVR CPLX: CPT | Performed by: RADIOLOGY

## 2024-01-26 PROCEDURE — 80053 COMPREHEN METABOLIC PANEL: CPT | Performed by: INTERNAL MEDICINE

## 2024-01-26 NOTE — PROGRESS NOTES
Remi Olivares  tolerated lab draw via port well with no complications.      Remi Olivares is aware of future appt on 1/29 at 8am.     AVS printed and given to Remi Olivares:  No (Declined by Remi Olivares)     Left unit in stable condition.

## 2024-01-29 ENCOUNTER — HOSPITAL ENCOUNTER (OUTPATIENT)
Dept: INFUSION CENTER | Facility: HOSPITAL | Age: 59
Discharge: HOME | End: 2024-01-29
Attending: INTERNAL MEDICINE
Payer: COMMERCIAL

## 2024-01-29 VITALS
WEIGHT: 199.08 LBS | SYSTOLIC BLOOD PRESSURE: 107 MMHG | HEIGHT: 72 IN | TEMPERATURE: 97.1 F | RESPIRATION RATE: 16 BRPM | HEART RATE: 68 BPM | DIASTOLIC BLOOD PRESSURE: 84 MMHG | OXYGEN SATURATION: 97 % | BODY MASS INDEX: 26.96 KG/M2

## 2024-01-29 DIAGNOSIS — C20 RECTAL ADENOCARCINOMA (HCC): Primary | ICD-10-CM

## 2024-01-29 PROCEDURE — 77336 RADIATION PHYSICS CONSULT: CPT | Performed by: RADIOLOGY

## 2024-01-29 PROCEDURE — 77386 HB NTSTY MODUL RAD TX DLVR CPLX: CPT | Performed by: RADIOLOGY

## 2024-01-29 PROCEDURE — 77014 CHG CT GUIDANCE RADIATION THERAPY FLDS PLACEMENT: CPT | Performed by: RADIOLOGY

## 2024-01-29 PROCEDURE — G0498 CHEMO EXTEND IV INFUS W/PUMP: HCPCS

## 2024-01-29 NOTE — PROGRESS NOTES
Remi Olivares  tolerated treatment well with no complications. CADD connected & double checked by second RN. Green light flashing & pump running.     Remi Olivares is aware of future appt on 2/2 at 3pm.     AVS printed and given to Remi Olivares:  No (Declined by Remi Olivares)

## 2024-01-30 PROCEDURE — 77427 RADIATION TX MANAGEMENT X5: CPT | Performed by: RADIOLOGY

## 2024-01-30 PROCEDURE — 77386 HB NTSTY MODUL RAD TX DLVR CPLX: CPT | Performed by: RADIOLOGY

## 2024-01-31 DIAGNOSIS — C20 RECTAL ADENOCARCINOMA (HCC): Primary | ICD-10-CM

## 2024-01-31 PROCEDURE — 77386 HB NTSTY MODUL RAD TX DLVR CPLX: CPT | Performed by: RADIOLOGY

## 2024-01-31 PROCEDURE — 77014 CHG CT GUIDANCE RADIATION THERAPY FLDS PLACEMENT: CPT | Performed by: RADIOLOGY

## 2024-02-01 ENCOUNTER — APPOINTMENT (OUTPATIENT)
Facility: HOSPITAL | Age: 59
End: 2024-02-01
Payer: COMMERCIAL

## 2024-02-01 PROCEDURE — 77014 CHG CT GUIDANCE RADIATION THERAPY FLDS PLACEMENT: CPT | Performed by: RADIOLOGY

## 2024-02-01 PROCEDURE — 77386 HB NTSTY MODUL RAD TX DLVR CPLX: CPT | Performed by: RADIOLOGY

## 2024-02-02 ENCOUNTER — APPOINTMENT (OUTPATIENT)
Facility: HOSPITAL | Age: 59
End: 2024-02-02
Payer: COMMERCIAL

## 2024-02-02 ENCOUNTER — HOSPITAL ENCOUNTER (OUTPATIENT)
Dept: INFUSION CENTER | Facility: HOSPITAL | Age: 59
End: 2024-02-02
Attending: INTERNAL MEDICINE
Payer: COMMERCIAL

## 2024-02-02 VITALS — TEMPERATURE: 98.8 F

## 2024-02-02 DIAGNOSIS — C20 RECTAL ADENOCARCINOMA (HCC): Primary | ICD-10-CM

## 2024-02-02 DIAGNOSIS — T45.1X5A CHEMOTHERAPY INDUCED NEUTROPENIA: ICD-10-CM

## 2024-02-02 DIAGNOSIS — D70.1 CHEMOTHERAPY INDUCED NEUTROPENIA: ICD-10-CM

## 2024-02-02 DIAGNOSIS — Z95.828 PORT-A-CATH IN PLACE: ICD-10-CM

## 2024-02-02 LAB
ALBUMIN SERPL BCP-MCNC: 4 G/DL (ref 3.5–5)
ALP SERPL-CCNC: 76 U/L (ref 34–104)
ALT SERPL W P-5'-P-CCNC: 22 U/L (ref 7–52)
ANION GAP SERPL CALCULATED.3IONS-SCNC: 8 MMOL/L
AST SERPL W P-5'-P-CCNC: 14 U/L (ref 13–39)
BASOPHILS # BLD AUTO: 0.01 THOUSANDS/ÂΜL (ref 0–0.1)
BASOPHILS NFR BLD AUTO: 0 % (ref 0–1)
BILIRUB SERPL-MCNC: 0.86 MG/DL (ref 0.2–1)
BUN SERPL-MCNC: 22 MG/DL (ref 5–25)
CALCIUM SERPL-MCNC: 9.1 MG/DL (ref 8.4–10.2)
CHLORIDE SERPL-SCNC: 102 MMOL/L (ref 96–108)
CO2 SERPL-SCNC: 25 MMOL/L (ref 21–32)
CREAT SERPL-MCNC: 0.66 MG/DL (ref 0.6–1.3)
EOSINOPHIL # BLD AUTO: 0.03 THOUSAND/ÂΜL (ref 0–0.61)
EOSINOPHIL NFR BLD AUTO: 1 % (ref 0–6)
ERYTHROCYTE [DISTWIDTH] IN BLOOD BY AUTOMATED COUNT: 14.9 % (ref 11.6–15.1)
GFR SERPL CREATININE-BSD FRML MDRD: 108 ML/MIN/1.73SQ M
GLUCOSE SERPL-MCNC: 245 MG/DL (ref 65–140)
HCT VFR BLD AUTO: 36.5 % (ref 36.5–49.3)
HGB BLD-MCNC: 12 G/DL (ref 12–17)
IMM GRANULOCYTES # BLD AUTO: 0.08 THOUSAND/UL (ref 0–0.2)
IMM GRANULOCYTES NFR BLD AUTO: 1 % (ref 0–2)
LYMPHOCYTES # BLD AUTO: 0.79 THOUSANDS/ÂΜL (ref 0.6–4.47)
LYMPHOCYTES NFR BLD AUTO: 14 % (ref 14–44)
MCH RBC QN AUTO: 35.1 PG (ref 26.8–34.3)
MCHC RBC AUTO-ENTMCNC: 32.9 G/DL (ref 31.4–37.4)
MCV RBC AUTO: 107 FL (ref 82–98)
MONOCYTES # BLD AUTO: 0.65 THOUSAND/ÂΜL (ref 0.17–1.22)
MONOCYTES NFR BLD AUTO: 11 % (ref 4–12)
NEUTROPHILS # BLD AUTO: 4.23 THOUSANDS/ÂΜL (ref 1.85–7.62)
NEUTS SEG NFR BLD AUTO: 73 % (ref 43–75)
NRBC BLD AUTO-RTO: 0 /100 WBCS
PLATELET # BLD AUTO: 152 THOUSANDS/UL (ref 149–390)
PMV BLD AUTO: 8.7 FL (ref 8.9–12.7)
POTASSIUM SERPL-SCNC: 3.9 MMOL/L (ref 3.5–5.3)
PROT SERPL-MCNC: 6.8 G/DL (ref 6.4–8.4)
RBC # BLD AUTO: 3.42 MILLION/UL (ref 3.88–5.62)
SODIUM SERPL-SCNC: 135 MMOL/L (ref 135–147)
WBC # BLD AUTO: 5.79 THOUSAND/UL (ref 4.31–10.16)

## 2024-02-02 PROCEDURE — 77386 HB NTSTY MODUL RAD TX DLVR CPLX: CPT | Performed by: RADIOLOGY

## 2024-02-02 PROCEDURE — 80053 COMPREHEN METABOLIC PANEL: CPT | Performed by: INTERNAL MEDICINE

## 2024-02-02 PROCEDURE — 85025 COMPLETE CBC W/AUTO DIFF WBC: CPT | Performed by: INTERNAL MEDICINE

## 2024-02-02 PROCEDURE — 77014 CHG CT GUIDANCE RADIATION THERAPY FLDS PLACEMENT: CPT | Performed by: RADIOLOGY

## 2024-02-02 NOTE — PROGRESS NOTES
Patient arrived for CADD pump d/c. CADD disconnected without complication. Port flushed and labs drawn, and de-accessed at this time. Patient declined AVS and verbalized next appointment. Partient discharged to home in stable condition via ambulation.

## 2024-02-05 ENCOUNTER — APPOINTMENT (OUTPATIENT)
Facility: HOSPITAL | Age: 59
End: 2024-02-05
Payer: COMMERCIAL

## 2024-02-05 ENCOUNTER — HOSPITAL ENCOUNTER (OUTPATIENT)
Dept: INFUSION CENTER | Facility: HOSPITAL | Age: 59
Discharge: HOME | End: 2024-02-05
Attending: INTERNAL MEDICINE
Payer: COMMERCIAL

## 2024-02-05 ENCOUNTER — TELEPHONE (OUTPATIENT)
Dept: HEMATOLOGY ONCOLOGY | Facility: CLINIC | Age: 59
End: 2024-02-05

## 2024-02-05 VITALS
TEMPERATURE: 98.4 F | OXYGEN SATURATION: 99 % | BODY MASS INDEX: 27.68 KG/M2 | SYSTOLIC BLOOD PRESSURE: 120 MMHG | WEIGHT: 204.37 LBS | RESPIRATION RATE: 18 BRPM | HEART RATE: 89 BPM | DIASTOLIC BLOOD PRESSURE: 75 MMHG | HEIGHT: 72 IN

## 2024-02-05 DIAGNOSIS — C20 RECTAL ADENOCARCINOMA (HCC): Primary | ICD-10-CM

## 2024-02-05 PROCEDURE — 77386 HB NTSTY MODUL RAD TX DLVR CPLX: CPT | Performed by: RADIOLOGY

## 2024-02-05 PROCEDURE — 77014 CHG CT GUIDANCE RADIATION THERAPY FLDS PLACEMENT: CPT | Performed by: RADIOLOGY

## 2024-02-05 PROCEDURE — 77336 RADIATION PHYSICS CONSULT: CPT | Performed by: RADIOLOGY

## 2024-02-05 PROCEDURE — G0498 CHEMO EXTEND IV INFUS W/PUMP: HCPCS

## 2024-02-05 NOTE — PROGRESS NOTES
Remi Olivares  tolerated treatment well with no complications. CADD pump connected, all clamps taped open, device infusing.    Remi Olivares is aware of future appt on 2/9/24 at 1500.     AVS printed and given to Remi Olivares:  No (Declined by Remi Olivares)

## 2024-02-06 ENCOUNTER — APPOINTMENT (OUTPATIENT)
Facility: HOSPITAL | Age: 59
End: 2024-02-06
Payer: COMMERCIAL

## 2024-02-06 PROCEDURE — 77014 CHG CT GUIDANCE RADIATION THERAPY FLDS PLACEMENT: CPT | Performed by: RADIOLOGY

## 2024-02-06 PROCEDURE — 77427 RADIATION TX MANAGEMENT X5: CPT | Performed by: RADIOLOGY

## 2024-02-06 PROCEDURE — 77386 HB NTSTY MODUL RAD TX DLVR CPLX: CPT | Performed by: RADIOLOGY

## 2024-02-07 ENCOUNTER — APPOINTMENT (OUTPATIENT)
Facility: HOSPITAL | Age: 59
End: 2024-02-07
Payer: COMMERCIAL

## 2024-02-07 PROCEDURE — 77386 HB NTSTY MODUL RAD TX DLVR CPLX: CPT | Performed by: RADIOLOGY

## 2024-02-07 PROCEDURE — 77014 CHG CT GUIDANCE RADIATION THERAPY FLDS PLACEMENT: CPT | Performed by: RADIOLOGY

## 2024-02-08 ENCOUNTER — APPOINTMENT (OUTPATIENT)
Facility: HOSPITAL | Age: 59
End: 2024-02-08
Payer: COMMERCIAL

## 2024-02-08 DIAGNOSIS — C20 RECTAL ADENOCARCINOMA (HCC): Primary | ICD-10-CM

## 2024-02-08 PROCEDURE — 77014 CHG CT GUIDANCE RADIATION THERAPY FLDS PLACEMENT: CPT | Performed by: RADIOLOGY

## 2024-02-08 PROCEDURE — 77386 HB NTSTY MODUL RAD TX DLVR CPLX: CPT | Performed by: RADIOLOGY

## 2024-02-09 ENCOUNTER — APPOINTMENT (OUTPATIENT)
Facility: HOSPITAL | Age: 59
End: 2024-02-09
Payer: COMMERCIAL

## 2024-02-09 ENCOUNTER — HOSPITAL ENCOUNTER (OUTPATIENT)
Dept: INFUSION CENTER | Facility: HOSPITAL | Age: 59
End: 2024-02-09
Attending: INTERNAL MEDICINE
Payer: COMMERCIAL

## 2024-02-09 ENCOUNTER — APPOINTMENT (OUTPATIENT)
Facility: HOSPITAL | Age: 59
End: 2024-02-09
Attending: RADIOLOGY
Payer: COMMERCIAL

## 2024-02-09 DIAGNOSIS — T45.1X5A CHEMOTHERAPY INDUCED NEUTROPENIA: ICD-10-CM

## 2024-02-09 DIAGNOSIS — C20 RECTAL ADENOCARCINOMA (HCC): Primary | ICD-10-CM

## 2024-02-09 DIAGNOSIS — D70.1 CHEMOTHERAPY INDUCED NEUTROPENIA: ICD-10-CM

## 2024-02-09 LAB
ALBUMIN SERPL BCP-MCNC: 4 G/DL (ref 3.5–5)
ALP SERPL-CCNC: 72 U/L (ref 34–104)
ALT SERPL W P-5'-P-CCNC: 25 U/L (ref 7–52)
ANION GAP SERPL CALCULATED.3IONS-SCNC: 8 MMOL/L
AST SERPL W P-5'-P-CCNC: 15 U/L (ref 13–39)
BASOPHILS # BLD AUTO: 0.01 THOUSANDS/ÂΜL (ref 0–0.1)
BASOPHILS NFR BLD AUTO: 0 % (ref 0–1)
BILIRUB SERPL-MCNC: 0.81 MG/DL (ref 0.2–1)
BUN SERPL-MCNC: 14 MG/DL (ref 5–25)
CALCIUM SERPL-MCNC: 9.4 MG/DL (ref 8.4–10.2)
CHLORIDE SERPL-SCNC: 102 MMOL/L (ref 96–108)
CO2 SERPL-SCNC: 26 MMOL/L (ref 21–32)
CREAT SERPL-MCNC: 0.67 MG/DL (ref 0.6–1.3)
EOSINOPHIL # BLD AUTO: 0.06 THOUSAND/ÂΜL (ref 0–0.61)
EOSINOPHIL NFR BLD AUTO: 1 % (ref 0–6)
ERYTHROCYTE [DISTWIDTH] IN BLOOD BY AUTOMATED COUNT: 15 % (ref 11.6–15.1)
GFR SERPL CREATININE-BSD FRML MDRD: 107 ML/MIN/1.73SQ M
GLUCOSE SERPL-MCNC: 241 MG/DL (ref 65–140)
HCT VFR BLD AUTO: 35.1 % (ref 36.5–49.3)
HGB BLD-MCNC: 11.8 G/DL (ref 12–17)
IMM GRANULOCYTES # BLD AUTO: 0.07 THOUSAND/UL (ref 0–0.2)
IMM GRANULOCYTES NFR BLD AUTO: 1 % (ref 0–2)
LYMPHOCYTES # BLD AUTO: 0.65 THOUSANDS/ÂΜL (ref 0.6–4.47)
LYMPHOCYTES NFR BLD AUTO: 12 % (ref 14–44)
MCH RBC QN AUTO: 35.9 PG (ref 26.8–34.3)
MCHC RBC AUTO-ENTMCNC: 33.6 G/DL (ref 31.4–37.4)
MCV RBC AUTO: 107 FL (ref 82–98)
MONOCYTES # BLD AUTO: 0.55 THOUSAND/ÂΜL (ref 0.17–1.22)
MONOCYTES NFR BLD AUTO: 11 % (ref 4–12)
NEUTROPHILS # BLD AUTO: 3.89 THOUSANDS/ÂΜL (ref 1.85–7.62)
NEUTS SEG NFR BLD AUTO: 75 % (ref 43–75)
NRBC BLD AUTO-RTO: 0 /100 WBCS
PLATELET # BLD AUTO: 177 THOUSANDS/UL (ref 149–390)
PMV BLD AUTO: 8.7 FL (ref 8.9–12.7)
POTASSIUM SERPL-SCNC: 3.6 MMOL/L (ref 3.5–5.3)
PROT SERPL-MCNC: 6.7 G/DL (ref 6.4–8.4)
RBC # BLD AUTO: 3.29 MILLION/UL (ref 3.88–5.62)
SODIUM SERPL-SCNC: 136 MMOL/L (ref 135–147)
WBC # BLD AUTO: 5.23 THOUSAND/UL (ref 4.31–10.16)

## 2024-02-09 PROCEDURE — 77014 CHG CT GUIDANCE RADIATION THERAPY FLDS PLACEMENT: CPT | Performed by: RADIOLOGY

## 2024-02-09 PROCEDURE — 85025 COMPLETE CBC W/AUTO DIFF WBC: CPT | Performed by: INTERNAL MEDICINE

## 2024-02-09 PROCEDURE — 77386 HB NTSTY MODUL RAD TX DLVR CPLX: CPT | Performed by: RADIOLOGY

## 2024-02-09 PROCEDURE — 80053 COMPREHEN METABOLIC PANEL: CPT | Performed by: INTERNAL MEDICINE

## 2024-02-12 ENCOUNTER — APPOINTMENT (OUTPATIENT)
Facility: HOSPITAL | Age: 59
End: 2024-02-12
Attending: RADIOLOGY
Payer: COMMERCIAL

## 2024-02-12 ENCOUNTER — OFFICE VISIT (OUTPATIENT)
Dept: HEMATOLOGY ONCOLOGY | Facility: CLINIC | Age: 59
End: 2024-02-12
Payer: COMMERCIAL

## 2024-02-12 ENCOUNTER — APPOINTMENT (OUTPATIENT)
Facility: HOSPITAL | Age: 59
End: 2024-02-12
Payer: COMMERCIAL

## 2024-02-12 ENCOUNTER — HOSPITAL ENCOUNTER (OUTPATIENT)
Dept: INFUSION CENTER | Facility: HOSPITAL | Age: 59
Discharge: HOME | End: 2024-02-12
Attending: INTERNAL MEDICINE
Payer: COMMERCIAL

## 2024-02-12 VITALS
HEIGHT: 72 IN | RESPIRATION RATE: 17 BRPM | TEMPERATURE: 97.8 F | DIASTOLIC BLOOD PRESSURE: 80 MMHG | OXYGEN SATURATION: 96 % | BODY MASS INDEX: 28.04 KG/M2 | HEART RATE: 99 BPM | SYSTOLIC BLOOD PRESSURE: 136 MMHG | WEIGHT: 207 LBS

## 2024-02-12 VITALS — BODY MASS INDEX: 27.95 KG/M2 | WEIGHT: 206.35 LBS | HEIGHT: 72 IN

## 2024-02-12 DIAGNOSIS — Z95.828 PORT-A-CATH IN PLACE: ICD-10-CM

## 2024-02-12 DIAGNOSIS — G47.00 INSOMNIA, UNSPECIFIED TYPE: ICD-10-CM

## 2024-02-12 DIAGNOSIS — Z86.39 HISTORY OF DIABETES MELLITUS: ICD-10-CM

## 2024-02-12 DIAGNOSIS — R97.8 ABNORMAL TUMOR MARKERS: ICD-10-CM

## 2024-02-12 DIAGNOSIS — T45.1X5A CHEMOTHERAPY-INDUCED NAUSEA: ICD-10-CM

## 2024-02-12 DIAGNOSIS — C20 RECTAL ADENOCARCINOMA (HCC): Primary | ICD-10-CM

## 2024-02-12 DIAGNOSIS — T45.1X5A CHEMOTHERAPY INDUCED DIARRHEA: ICD-10-CM

## 2024-02-12 DIAGNOSIS — R11.0 CHEMOTHERAPY-INDUCED NAUSEA: ICD-10-CM

## 2024-02-12 DIAGNOSIS — K52.1 CHEMOTHERAPY INDUCED DIARRHEA: ICD-10-CM

## 2024-02-12 DIAGNOSIS — C77.9 REGIONAL LYMPH NODE METASTASIS PRESENT (HCC): ICD-10-CM

## 2024-02-12 DIAGNOSIS — T45.1X5A CHEMOTHERAPY INDUCED NEUTROPENIA: ICD-10-CM

## 2024-02-12 DIAGNOSIS — D70.1 CHEMOTHERAPY INDUCED NEUTROPENIA: ICD-10-CM

## 2024-02-12 DIAGNOSIS — G89.3 CANCER RELATED PAIN: ICD-10-CM

## 2024-02-12 DIAGNOSIS — E78.5 DYSLIPIDEMIA: ICD-10-CM

## 2024-02-12 PROCEDURE — 99214 OFFICE O/P EST MOD 30 MIN: CPT | Performed by: INTERNAL MEDICINE

## 2024-02-12 PROCEDURE — G0498 CHEMO EXTEND IV INFUS W/PUMP: HCPCS

## 2024-02-12 PROCEDURE — 77336 RADIATION PHYSICS CONSULT: CPT | Performed by: RADIOLOGY

## 2024-02-12 PROCEDURE — 77386 HB NTSTY MODUL RAD TX DLVR CPLX: CPT | Performed by: RADIOLOGY

## 2024-02-12 PROCEDURE — 77014 CHG CT GUIDANCE RADIATION THERAPY FLDS PLACEMENT: CPT | Performed by: RADIOLOGY

## 2024-02-12 NOTE — PROGRESS NOTES
Remi Olivares  tolerated treatment well with no complications.      Remi Olivares is aware of future appt on 02/16/2024 at 03:00 PM.     AVS printed and given to Remi Olivares:  No (Declined by Remi Olivares)     CADD Pump infusing, green light flashing, all clamps open and taped.

## 2024-02-12 NOTE — PATIENT INSTRUCTIONS
Patient has standing orders for blood work.  Chemotherapy will finish when the radiation is completed.  He will continue to get port flushed once every 6 weeks after completion of chemotherapy.  Follow-up in 2 months.

## 2024-02-12 NOTE — PROGRESS NOTES
HPI: In August 2023 patient was diagnosed to have  lower rectal cancer, adenocarcinoma, at the dentate line.  Patient is receiving total neoadjuvant therapy.  He has completed 8 cycles of FOLFOX chemotherapy and is receiving infusion 5-FU concurrent with radiation and chemotherapy will finish when radiation is completed and after that patient will be evaluated by rectal surgeon for surgery.  Patient has been tolerating treatments without much problem.  He has some burning sensation in the perirectal area.  He has topical medication to apply prescribed by radiation oncologist.  No more rectal bleeding.  No significant peripheral neuropathy.  He has stopped losing weight.  He is eating better.  Bowel movements are getting better.  Much less rectal pain.  He is sleeping better.  Patient had a colonoscopy in August 2023 and this was his first colonoscopy and that showed anterior based fungating fixed rectal tumor at the dentate line.  Additional benign polyps.     A. Colon, random colon biopsy:  - Fragments of colonic mucosa with increased intraepithelial lymphocytes and focal surface epithelial damage.  - There is no increase in subepithelial collagen thickness. See note.     Note (A): The above morphologic features may be compatible with lymphocytic (microscopic) colitis in conjunction with appropriate clinical and endoscopic findings.       B. Large Intestine, Transverse Colon, hot snare transverse colon polyp:  - Tubular adenoma, fragments.  - Negative for high grade dysplasia/ carcinoma.     C. Large Intestine, Left/Descending Colon, hot snare descending colon polyp:  - Benign colonic mucosa without significant microscopic abnormality.     D. Large Intestine, Sigmoid Colon, hot snare mid sigmoid polyp:  - Tubular adenoma.  - Negative for high grade dysplasia/ carcinoma.     E. Large Intestine, Sigmoid Colon, distal sigmoid polyps- hot snare  x1 / cold snare  2:  - Tubular adenoma x 2, negative for high grade  dysplasia/ carcinoma.  - Hyperplastic polyp, negative for dysplasia/ carcinoma.      F. Rectum, biopsy rectal mass:  - Adenocarcinoma, superficial fragments. See note.  - MMR panel is pending.     Note (F): This case was reviewed at the intradepartmental  conference.   Dr. Quinones is notified of the diagnosis in [employer] via [employer] on 2023  at 1.30 pm.   Electronically signed by Griffin Stevenson MD on 2023 at  1:41 PM     .Antibody          Clone               Description                           Results  MLH1               M1                   Mismatch repair protein       Intact nuclear expression  MSH2              [id number]       Mismatch repair protein       Intact nuclear expression  MSH6              44                     Mismatch repair protein       Intact nuclear expression  PMS2              [id number]           Mismatch repair protein       Intact nuclear expression     Historical Information   Past Medical History:   Diagnosis Date   • Diabetes mellitus (HCC)    • Hyperlipidemia      Past Surgical History:   Procedure Laterality Date   • GALLBLADDER SURGERY     • IR PORT PLACEMENT  2023   • TONSILLECTOMY     • UMBILICAL HERNIA REPAIR       Social History   Social History     Substance and Sexual Activity   Alcohol Use Never     Social History     Substance and Sexual Activity   Drug Use Never     Social History     Tobacco Use   Smoking Status Former   • Current packs/day: 0.00   • Average packs/day: 2.0 packs/day for 30.0 years (60.0 ttl pk-yrs)   • Types: Cigarettes   • Start date:    • Quit date:    • Years since quittin.1   • Passive exposure: Past   Smokeless Tobacco Never     Family History:   Family History   Problem Relation Age of Onset   • No Known Problems Mother    • No Known Problems Father          Current Outpatient Medications:   •  buPROPion (WELLBUTRIN XL) 300 mg 24 hr tablet, Take 300 mg by mouth daily, Disp: , Rfl:   •   escitalopram (LEXAPRO) 20 mg tablet, Take 20 mg by mouth daily, Disp: , Rfl:   •  fluorouracil 2,440 mg in CADD/Elastomeric Infusion Device, Infuse 2,440 mg (225 mg/m2/day x 2.17 m2) into a catheter in a vein via infusion device over 120 hours for 5 days  Infusion planned for February 12, 2024., Disp: 1 each, Rfl: 0  •  ibuprofen (MOTRIN) 200 mg tablet, Take 600 mg by mouth every 6 (six) hours, Disp: , Rfl:   •  lidocaine-prilocaine (EMLA) cream, Apply topically as needed for mild pain, Disp: 1 g, Rfl: 1  •  lisinopril (ZESTRIL) 5 mg tablet, Take 5 mg by mouth daily, Disp: , Rfl:   •  metFORMIN (GLUCOPHAGE) 1000 MG tablet, Take 1,000 mg by mouth 2 (two) times a day, Disp: , Rfl:   •  ondansetron (ZOFRAN) 8 mg tablet, Take one tablet by mouth every 8 hours an needed for nausea, Disp: 20 tablet, Rfl: 0  •  prochlorperazine (COMPAZINE) 10 mg tablet, Take 1 tablet (10 mg total) by mouth every 6 (six) hours as needed for nausea or vomiting, Disp: 30 tablet, Rfl: 0  •  rosuvastatin (CRESTOR) 20 MG tablet, Take 20 mg by mouth daily, Disp: , Rfl:   •  traMADol (Ultram) 50 mg tablet, Take 1 tablet (50 mg total) by mouth every 4 (four) hours as needed for moderate pain, Disp: 120 tablet, Rfl: 0  •  [START ON 2/19/2024] fluorouracil 2,440 mg in CADD/Elastomeric Infusion Device, Infuse 2,440 mg (225 mg/m2/day x 2.17 m2) into a catheter in a vein via infusion device over 120 hours for 5 days  Infusion planned for February 19, 2024., Disp: 1 each, Rfl: 0  No current facility-administered medications for this visit.    Allergies   Allergen Reactions   • Atorvastatin Myalgia   ROS:  09/13/23 Reviewed 12 systems: See symptoms in HPI  Presently no other neurological, cardiac, pulmonary, GI and  symptoms other than listed in HPI.  Other symptoms are in HPI.  No  fever, chills, bleeding, bone pains, skin rash,  night sweats, arthritic symptoms,  tiredness , weakness, numbness, claudication and gait problem. No frequent infections.   Not unusually sensitive to heat or cold. No swelling of the ankles. No swollen glands.  Patient is much less anxious.     Physical Exam:  Vitals:    02/12/24 1555   BP: 136/80   BP Location: Left arm   Patient Position: Sitting   Cuff Size: Adult   Pulse: 99   Resp: 17   Temp: 97.8 °F (36.6 °C)   SpO2: 96%   Weight: 93.9 kg (207 lb)   Height: 6' (1.829 m)   Alert and oriented and not in distress.  Vitals are above.  No icterus.  No oral thrush.  No palpable neck mass.  Clear lung fields.  Regular heart rate.  Soft and nontender abdomen.  No palpable abdominal mass.  No ascites.  No edema of ankles.  No calf tenderness.  No focal neurological deficit.  No skin rash.  No palpable lymphadenopathy in the neck and axillary areas,  no clubbing.   Patient is anxious.  Performance status 0 .      Lab Results: I have reviewed all pertinent labs.  LABS:    Results for orders placed or performed during the hospital encounter of 02/09/24   CBC and differential   Result Value Ref Range    WBC 5.23 4.31 - 10.16 Thousand/uL    RBC 3.29 (L) 3.88 - 5.62 Million/uL    Hemoglobin 11.8 (L) 12.0 - 17.0 g/dL    Hematocrit 35.1 (L) 36.5 - 49.3 %     (H) 82 - 98 fL    MCH 35.9 (H) 26.8 - 34.3 pg    MCHC 33.6 31.4 - 37.4 g/dL    RDW 15.0 11.6 - 15.1 %    MPV 8.7 (L) 8.9 - 12.7 fL    Platelets 177 149 - 390 Thousands/uL    nRBC 0 /100 WBCs    Neutrophils Relative 75 43 - 75 %    Immat GRANS % 1 0 - 2 %    Lymphocytes Relative 12 (L) 14 - 44 %    Monocytes Relative 11 4 - 12 %    Eosinophils Relative 1 0 - 6 %    Basophils Relative 0 0 - 1 %    Neutrophils Absolute 3.89 1.85 - 7.62 Thousands/µL    Immature Grans Absolute 0.07 0.00 - 0.20 Thousand/uL    Lymphocytes Absolute 0.65 0.60 - 4.47 Thousands/µL    Monocytes Absolute 0.55 0.17 - 1.22 Thousand/µL    Eosinophils Absolute 0.06 0.00 - 0.61 Thousand/µL    Basophils Absolute 0.01 0.00 - 0.10 Thousands/µL   Comprehensive metabolic panel   Result Value Ref Range    Sodium 136 135 - 147  mmol/L    Potassium 3.6 3.5 - 5.3 mmol/L    Chloride 102 96 - 108 mmol/L    CO2 26 21 - 32 mmol/L    ANION GAP 8 mmol/L    BUN 14 5 - 25 mg/dL    Creatinine 0.67 0.60 - 1.30 mg/dL    Glucose 241 (H) 65 - 140 mg/dL    Calcium 9.4 8.4 - 10.2 mg/dL    AST 15 13 - 39 U/L    ALT 25 7 - 52 U/L    Alkaline Phosphatase 72 34 - 104 U/L    Total Protein 6.7 6.4 - 8.4 g/dL    Albumin 4.0 3.5 - 5.0 g/dL    Total Bilirubin 0.81 0.20 - 1.00 mg/dL    eGFR 107 ml/min/1.73sq m       0 Result Notes        Component  Ref Range & Units 11/16/23  6:41 AM 9/22/23  7:47 AM 9/5/23  6:57 AM   CEA  0.0 - 4.7 ng/mL 5.1 High  33.4 High  CM 36.2 High  CM   Comment:                              Nonsmokers          <3.9          Imaging Studies: I have personally reviewed pertinent reports.    IMPRESSION:  Unenhanced MRI of the Pelvis (Rectal Protocol)     Low rectal cancer, 3.5 cm from the anal verge, 6.3 cm long, centered along the anterior rectal wall. Anteriorly, the tumor invades the right apex of the prostate (series 5 images 26-27.)     Overall MRI stage: T4b, N1, Low rectal cancer  MRF: Not applicable for T4 tumor.  Sphincter involvement: Yes. There is internal sphincter invasion in the posterior half of the upper internal sphincter (series 8 images 14-15, and series 2 image 13.)  Suspicious extra mesorectal lymph nodes: There is a suspicious superior rectal chain 9 mm node (series 4 image 13 and series 7 image 13.)  EMVI: No     For the purpose of radiation therapy planning, series 5 and 7 would be most useful. (Series 5 is a small field of view axial T2 weighted sequence typically most useful for radiation therapy planning, however the superior rectal chain node was not   included on this sequence, and is better seen on series 7.)                 Workstation performed: [id number]        Imaging    MRI pelvis rectal cancer staging wo contrast (Order: [id number]) - 9/11/2023  IMPRESSION:     No evidence of metastatic disease in the  chest, abdomen, and pelvis.     Please see subsequent scheduled rectal cancer protocol pelvic MRI for evaluation of local tumor staging.                 Workstation performed: [id number]        Imaging    CT chest abdomen pelvis w contrast (Order: [id number]) - 9/7/2023    Pathology, and Other Studies: I have personally reviewed pertinent reports.    See above    Assessment and Plan:  See diagnoses, orders instructions below    In August 2023 patient was diagnosed to have  lower rectal cancer, adenocarcinoma, at the dentate line.  Patient is receiving total neoadjuvant therapy.  He has completed 8 cycles of FOLFOX chemotherapy and is receiving infusion 5-FU concurrent with radiation and chemotherapy will finish when radiation is completed and after that patient will be evaluated by rectal surgeon for surgery.  Patient has been tolerating treatments without much problem.  He has some burning sensation in the perirectal area.  He has topical medication to apply prescribed by radiation oncologist.  No more rectal bleeding.  No significant peripheral neuropathy.  He has stopped losing weight.  He is eating better.  Bowel movements are getting better.   Much less rectal pain.  He is sleeping better.   Patient had a colonoscopy in August 2023 and this was his first colonoscopy and that showed anterior based fungating fixed rectal tumor at the dentate line.  Additional benign polyps.     A. Colon, random colon biopsy:  - Fragments of colonic mucosa with increased intraepithelial lymphocytes and focal surface epithelial damage.  - There is no increase in subepithelial collagen thickness. See note.     Note (A): The above morphologic features may be compatible with lymphocytic (microscopic) colitis in conjunction with appropriate clinical and endoscopic findings.       B. Large Intestine, Transverse Colon, hot snare transverse colon polyp:  - Tubular adenoma, fragments.  - Negative for high grade dysplasia/ carcinoma.     C.  Large Intestine, Left/Descending Colon, hot snare descending colon polyp:  - Benign colonic mucosa without significant microscopic abnormality.     D. Large Intestine, Sigmoid Colon, hot snare mid sigmoid polyp:  - Tubular adenoma.  - Negative for high grade dysplasia/ carcinoma.     E. Large Intestine, Sigmoid Colon, distal sigmoid polyps- hot snare  x1 / cold snare  2:  - Tubular adenoma x 2, negative for high grade dysplasia/ carcinoma.  - Hyperplastic polyp, negative for dysplasia/ carcinoma.      F. Rectum, biopsy rectal mass:  - Adenocarcinoma, superficial fragments. See note.  - MMR panel is pending.     Note (F): This case was reviewed at the intradepartmental  conference.   Dr. Quinones is notified of the diagnosis in [employer] via [employer] on 08/25/2023  at 1.30 pm.   Electronically signed by Griffin Stevenson MD on 8/25/2023 at  1:41 PM     .Antibody          Clone               Description                           Results  MLH1               M1                   Mismatch repair protein       Intact nuclear expression  MSH2              [id number]       Mismatch repair protein       Intact nuclear expression  MSH6              44                     Mismatch repair protein       Intact nuclear expression  PMS2              [id number]           Mismatch repair protein       Intact nuclear expression   Physical examination and test results are as recorded and discussed.  Patient is responding to therapy.  Improvement in symptoms.  CEA has come down from 36-5.  Plan is as above.  Goal is cure from rectal cancer if possible.  Diet and activities as tolerated.  Later on health screening test.  Patient is capable of self-care.  Plan is as above, completion of total neoadjuvant chemotherapy and radiation followed by evaluation for surgery.  All discussed in very much detail.  Questions answered.  Port flush to continue after completion of chemotherapy.  See diagnoses, orders and instructions  below.  1. Rectal adenocarcinoma (HCC)      2. Regional lymph node metastasis present (HCC)      3. Abnormal tumor markers      4. Chemotherapy induced neutropenia       5. Chemotherapy-induced nausea      6. Cancer related pain      7. History of diabetes mellitus      8. Dyslipidemia      9. Port-A-Cath in place      10. Insomnia, unspecified type      11. Chemotherapy induced diarrhea      Patient has standing orders for blood work.  Chemotherapy will finish when the radiation is completed.  He will continue to get port flushed once every 6 weeks after completion of chemotherapy.  Follow-up in 2 months.            Disclaimer: This document was prepared using dictation device.  If a word or phrase is confusing, or does not make sense, this is likely due to recognition error which was not discovered during the providers review. If you believe an error has occurred, please Contact me through [facility] Line service for tiara?cation.    Counseling / Coordination of Care  ..  Provided counseling and support

## 2024-02-13 ENCOUNTER — APPOINTMENT (OUTPATIENT)
Facility: HOSPITAL | Age: 59
End: 2024-02-13
Payer: COMMERCIAL

## 2024-02-13 ENCOUNTER — APPOINTMENT (OUTPATIENT)
Facility: HOSPITAL | Age: 59
End: 2024-02-13
Attending: RADIOLOGY
Payer: COMMERCIAL

## 2024-02-14 ENCOUNTER — APPOINTMENT (OUTPATIENT)
Facility: HOSPITAL | Age: 59
End: 2024-02-14
Payer: COMMERCIAL

## 2024-02-14 PROCEDURE — 77427 RADIATION TX MANAGEMENT X5: CPT | Performed by: RADIOLOGY

## 2024-02-14 PROCEDURE — 77386 HB NTSTY MODUL RAD TX DLVR CPLX: CPT | Performed by: RADIOLOGY

## 2024-02-14 PROCEDURE — 77014 CHG CT GUIDANCE RADIATION THERAPY FLDS PLACEMENT: CPT | Performed by: RADIOLOGY

## 2024-02-15 ENCOUNTER — APPOINTMENT (OUTPATIENT)
Facility: HOSPITAL | Age: 59
End: 2024-02-15
Payer: COMMERCIAL

## 2024-02-15 ENCOUNTER — APPOINTMENT (OUTPATIENT)
Facility: HOSPITAL | Age: 59
End: 2024-02-15
Attending: RADIOLOGY
Payer: COMMERCIAL

## 2024-02-15 DIAGNOSIS — C20 RECTAL ADENOCARCINOMA (HCC): Primary | ICD-10-CM

## 2024-02-15 PROCEDURE — 77386 HB NTSTY MODUL RAD TX DLVR CPLX: CPT | Performed by: RADIOLOGY

## 2024-02-15 PROCEDURE — 77014 CHG CT GUIDANCE RADIATION THERAPY FLDS PLACEMENT: CPT | Performed by: RADIOLOGY

## 2024-02-16 ENCOUNTER — APPOINTMENT (OUTPATIENT)
Facility: HOSPITAL | Age: 59
End: 2024-02-16
Attending: RADIOLOGY
Payer: COMMERCIAL

## 2024-02-16 ENCOUNTER — APPOINTMENT (OUTPATIENT)
Facility: HOSPITAL | Age: 59
End: 2024-02-16
Payer: COMMERCIAL

## 2024-02-16 ENCOUNTER — HOSPITAL ENCOUNTER (OUTPATIENT)
Dept: INFUSION CENTER | Facility: HOSPITAL | Age: 59
End: 2024-02-16
Attending: INTERNAL MEDICINE
Payer: COMMERCIAL

## 2024-02-16 VITALS — TEMPERATURE: 98.7 F

## 2024-02-16 DIAGNOSIS — C20 RECTAL ADENOCARCINOMA (HCC): Primary | ICD-10-CM

## 2024-02-16 DIAGNOSIS — T45.1X5A CHEMOTHERAPY INDUCED NEUTROPENIA: ICD-10-CM

## 2024-02-16 DIAGNOSIS — D70.1 CHEMOTHERAPY INDUCED NEUTROPENIA: ICD-10-CM

## 2024-02-16 DIAGNOSIS — Z95.828 PORT-A-CATH IN PLACE: ICD-10-CM

## 2024-02-16 LAB
ALBUMIN SERPL BCP-MCNC: 4.2 G/DL (ref 3.5–5)
ALP SERPL-CCNC: 78 U/L (ref 34–104)
ALT SERPL W P-5'-P-CCNC: 26 U/L (ref 7–52)
ANION GAP SERPL CALCULATED.3IONS-SCNC: 9 MMOL/L
AST SERPL W P-5'-P-CCNC: 15 U/L (ref 13–39)
BASOPHILS # BLD AUTO: 0.02 THOUSANDS/ÂΜL (ref 0–0.1)
BASOPHILS NFR BLD AUTO: 0 % (ref 0–1)
BILIRUB SERPL-MCNC: 1.09 MG/DL (ref 0.2–1)
BUN SERPL-MCNC: 17 MG/DL (ref 5–25)
CALCIUM SERPL-MCNC: 9.8 MG/DL (ref 8.4–10.2)
CHLORIDE SERPL-SCNC: 102 MMOL/L (ref 96–108)
CO2 SERPL-SCNC: 24 MMOL/L (ref 21–32)
CREAT SERPL-MCNC: 0.79 MG/DL (ref 0.6–1.3)
EOSINOPHIL # BLD AUTO: 0.08 THOUSAND/ÂΜL (ref 0–0.61)
EOSINOPHIL NFR BLD AUTO: 1 % (ref 0–6)
ERYTHROCYTE [DISTWIDTH] IN BLOOD BY AUTOMATED COUNT: 15.1 % (ref 11.6–15.1)
GFR SERPL CREATININE-BSD FRML MDRD: 100 ML/MIN/1.73SQ M
GLUCOSE SERPL-MCNC: 153 MG/DL (ref 65–140)
HCT VFR BLD AUTO: 37.4 % (ref 36.5–49.3)
HGB BLD-MCNC: 12.5 G/DL (ref 12–17)
IMM GRANULOCYTES # BLD AUTO: 0.07 THOUSAND/UL (ref 0–0.2)
IMM GRANULOCYTES NFR BLD AUTO: 1 % (ref 0–2)
LYMPHOCYTES # BLD AUTO: 0.57 THOUSANDS/ÂΜL (ref 0.6–4.47)
LYMPHOCYTES NFR BLD AUTO: 7 % (ref 14–44)
MCH RBC QN AUTO: 36 PG (ref 26.8–34.3)
MCHC RBC AUTO-ENTMCNC: 33.4 G/DL (ref 31.4–37.4)
MCV RBC AUTO: 108 FL (ref 82–98)
MONOCYTES # BLD AUTO: 0.86 THOUSAND/ÂΜL (ref 0.17–1.22)
MONOCYTES NFR BLD AUTO: 11 % (ref 4–12)
NEUTROPHILS # BLD AUTO: 6.62 THOUSANDS/ÂΜL (ref 1.85–7.62)
NEUTS SEG NFR BLD AUTO: 80 % (ref 43–75)
NRBC BLD AUTO-RTO: 0 /100 WBCS
PLATELET # BLD AUTO: 221 THOUSANDS/UL (ref 149–390)
PMV BLD AUTO: 8.4 FL (ref 8.9–12.7)
POTASSIUM SERPL-SCNC: 3.7 MMOL/L (ref 3.5–5.3)
PROT SERPL-MCNC: 7 G/DL (ref 6.4–8.4)
RBC # BLD AUTO: 3.47 MILLION/UL (ref 3.88–5.62)
SODIUM SERPL-SCNC: 135 MMOL/L (ref 135–147)
WBC # BLD AUTO: 8.22 THOUSAND/UL (ref 4.31–10.16)

## 2024-02-16 PROCEDURE — 80053 COMPREHEN METABOLIC PANEL: CPT | Performed by: INTERNAL MEDICINE

## 2024-02-16 PROCEDURE — 85025 COMPLETE CBC W/AUTO DIFF WBC: CPT | Performed by: INTERNAL MEDICINE

## 2024-02-16 PROCEDURE — 77386 HB NTSTY MODUL RAD TX DLVR CPLX: CPT | Performed by: RADIOLOGY

## 2024-02-16 PROCEDURE — 77014 CHG CT GUIDANCE RADIATION THERAPY FLDS PLACEMENT: CPT | Performed by: RADIOLOGY

## 2024-02-16 NOTE — PROGRESS NOTES
Remi Olivares  tolerated treatment well with no complications.      Remi Olivares is aware of future appt on 2/19 at 0830.     AVS printed and given to Remi Olivares:  No (Declined by Remi Olivares)

## 2024-02-19 ENCOUNTER — APPOINTMENT (OUTPATIENT)
Facility: HOSPITAL | Age: 59
End: 2024-02-19
Payer: COMMERCIAL

## 2024-02-19 ENCOUNTER — HOSPITAL ENCOUNTER (OUTPATIENT)
Dept: INFUSION CENTER | Facility: HOSPITAL | Age: 59
Discharge: HOME | End: 2024-02-19
Attending: INTERNAL MEDICINE
Payer: COMMERCIAL

## 2024-02-19 VITALS
RESPIRATION RATE: 16 BRPM | SYSTOLIC BLOOD PRESSURE: 117 MMHG | DIASTOLIC BLOOD PRESSURE: 84 MMHG | HEART RATE: 97 BPM | HEIGHT: 72 IN | BODY MASS INDEX: 27.59 KG/M2 | TEMPERATURE: 96.9 F | OXYGEN SATURATION: 98 % | WEIGHT: 203.71 LBS

## 2024-02-19 DIAGNOSIS — C20 RECTAL ADENOCARCINOMA (HCC): Primary | ICD-10-CM

## 2024-02-19 PROCEDURE — 77014 CHG CT GUIDANCE RADIATION THERAPY FLDS PLACEMENT: CPT | Performed by: RADIOLOGY

## 2024-02-19 PROCEDURE — G0498 CHEMO EXTEND IV INFUS W/PUMP: HCPCS

## 2024-02-19 PROCEDURE — 77386 HB NTSTY MODUL RAD TX DLVR CPLX: CPT | Performed by: RADIOLOGY

## 2024-02-19 NOTE — PROGRESS NOTES
Remi Olivares  tolerated treatment well with no complications.      Remi Olivares is aware of future appt on 2/23/234 at 1500.     AVS printed and given to Remi Olivares:  No (Declined by Remi Olivares)

## 2024-02-20 ENCOUNTER — APPOINTMENT (OUTPATIENT)
Facility: HOSPITAL | Age: 59
End: 2024-02-20
Payer: COMMERCIAL

## 2024-02-20 PROCEDURE — 77386 HB NTSTY MODUL RAD TX DLVR CPLX: CPT | Performed by: RADIOLOGY

## 2024-02-20 PROCEDURE — 77014 CHG CT GUIDANCE RADIATION THERAPY FLDS PLACEMENT: CPT | Performed by: RADIOLOGY

## 2024-02-20 PROCEDURE — 77336 RADIATION PHYSICS CONSULT: CPT | Performed by: RADIOLOGY

## 2024-02-21 ENCOUNTER — APPOINTMENT (OUTPATIENT)
Facility: HOSPITAL | Age: 59
End: 2024-02-21
Payer: COMMERCIAL

## 2024-02-21 PROBLEM — K52.1 CHEMOTHERAPY INDUCED DIARRHEA: Status: RESOLVED | Noted: 2024-02-12 | Resolved: 2024-02-21

## 2024-02-21 PROBLEM — T45.1X5A CHEMOTHERAPY INDUCED DIARRHEA: Status: RESOLVED | Noted: 2024-02-12 | Resolved: 2024-02-21

## 2024-02-22 ENCOUNTER — APPOINTMENT (OUTPATIENT)
Facility: HOSPITAL | Age: 59
End: 2024-02-22
Payer: COMMERCIAL

## 2024-02-22 PROCEDURE — 77386 HB NTSTY MODUL RAD TX DLVR CPLX: CPT | Performed by: RADIOLOGY

## 2024-02-22 PROCEDURE — 77014 CHG CT GUIDANCE RADIATION THERAPY FLDS PLACEMENT: CPT | Performed by: RADIOLOGY

## 2024-02-23 ENCOUNTER — HOSPITAL ENCOUNTER (OUTPATIENT)
Dept: INFUSION CENTER | Facility: HOSPITAL | Age: 59
End: 2024-02-23
Attending: INTERNAL MEDICINE
Payer: COMMERCIAL

## 2024-02-23 ENCOUNTER — APPOINTMENT (OUTPATIENT)
Facility: HOSPITAL | Age: 59
End: 2024-02-23
Payer: COMMERCIAL

## 2024-02-23 ENCOUNTER — APPOINTMENT (OUTPATIENT)
Facility: HOSPITAL | Age: 59
End: 2024-02-23
Attending: RADIOLOGY
Payer: COMMERCIAL

## 2024-02-23 DIAGNOSIS — G89.3 CANCER RELATED PAIN: ICD-10-CM

## 2024-02-23 DIAGNOSIS — C20 RECTAL ADENOCARCINOMA (HCC): Primary | ICD-10-CM

## 2024-02-23 DIAGNOSIS — D70.1 CHEMOTHERAPY INDUCED NEUTROPENIA: ICD-10-CM

## 2024-02-23 DIAGNOSIS — T45.1X5A CHEMOTHERAPY INDUCED NEUTROPENIA: ICD-10-CM

## 2024-02-23 LAB
ALBUMIN SERPL BCP-MCNC: 4.1 G/DL (ref 3.5–5)
ALP SERPL-CCNC: 67 U/L (ref 34–104)
ALT SERPL W P-5'-P-CCNC: 28 U/L (ref 7–52)
ANION GAP SERPL CALCULATED.3IONS-SCNC: 7 MMOL/L
AST SERPL W P-5'-P-CCNC: 15 U/L (ref 13–39)
BASOPHILS # BLD AUTO: 0.03 THOUSANDS/ÂΜL (ref 0–0.1)
BASOPHILS NFR BLD AUTO: 1 % (ref 0–1)
BILIRUB SERPL-MCNC: 0.95 MG/DL (ref 0.2–1)
BUN SERPL-MCNC: 15 MG/DL (ref 5–25)
CALCIUM SERPL-MCNC: 9.4 MG/DL (ref 8.4–10.2)
CHLORIDE SERPL-SCNC: 102 MMOL/L (ref 96–108)
CO2 SERPL-SCNC: 27 MMOL/L (ref 21–32)
CREAT SERPL-MCNC: 0.71 MG/DL (ref 0.6–1.3)
EOSINOPHIL # BLD AUTO: 0.12 THOUSAND/ÂΜL (ref 0–0.61)
EOSINOPHIL NFR BLD AUTO: 2 % (ref 0–6)
ERYTHROCYTE [DISTWIDTH] IN BLOOD BY AUTOMATED COUNT: 14.8 % (ref 11.6–15.1)
GFR SERPL CREATININE-BSD FRML MDRD: 104 ML/MIN/1.73SQ M
GLUCOSE SERPL-MCNC: 117 MG/DL (ref 65–140)
HCT VFR BLD AUTO: 37.3 % (ref 36.5–49.3)
HGB BLD-MCNC: 12.5 G/DL (ref 12–17)
IMM GRANULOCYTES # BLD AUTO: 0.06 THOUSAND/UL (ref 0–0.2)
IMM GRANULOCYTES NFR BLD AUTO: 1 % (ref 0–2)
LYMPHOCYTES # BLD AUTO: 0.61 THOUSANDS/ÂΜL (ref 0.6–4.47)
LYMPHOCYTES NFR BLD AUTO: 11 % (ref 14–44)
MCH RBC QN AUTO: 35.7 PG (ref 26.8–34.3)
MCHC RBC AUTO-ENTMCNC: 33.5 G/DL (ref 31.4–37.4)
MCV RBC AUTO: 107 FL (ref 82–98)
MONOCYTES # BLD AUTO: 0.59 THOUSAND/ÂΜL (ref 0.17–1.22)
MONOCYTES NFR BLD AUTO: 11 % (ref 4–12)
NEUTROPHILS # BLD AUTO: 4.04 THOUSANDS/ÂΜL (ref 1.85–7.62)
NEUTS SEG NFR BLD AUTO: 74 % (ref 43–75)
NRBC BLD AUTO-RTO: 0 /100 WBCS
PLATELET # BLD AUTO: 208 THOUSANDS/UL (ref 149–390)
PMV BLD AUTO: 8.3 FL (ref 8.9–12.7)
POTASSIUM SERPL-SCNC: 3.7 MMOL/L (ref 3.5–5.3)
PROT SERPL-MCNC: 6.7 G/DL (ref 6.4–8.4)
RBC # BLD AUTO: 3.5 MILLION/UL (ref 3.88–5.62)
SODIUM SERPL-SCNC: 136 MMOL/L (ref 135–147)
WBC # BLD AUTO: 5.45 THOUSAND/UL (ref 4.31–10.16)

## 2024-02-23 PROCEDURE — 77386 HB NTSTY MODUL RAD TX DLVR CPLX: CPT | Performed by: RADIOLOGY

## 2024-02-23 PROCEDURE — 80053 COMPREHEN METABOLIC PANEL: CPT | Performed by: INTERNAL MEDICINE

## 2024-02-23 PROCEDURE — 77014 CHG CT GUIDANCE RADIATION THERAPY FLDS PLACEMENT: CPT | Performed by: RADIOLOGY

## 2024-02-23 PROCEDURE — 85025 COMPLETE CBC W/AUTO DIFF WBC: CPT | Performed by: INTERNAL MEDICINE

## 2024-02-23 RX ORDER — TRAMADOL HYDROCHLORIDE 50 MG/1
50 TABLET ORAL EVERY 4 HOURS PRN
Qty: 120 TABLET | Refills: 0 | Status: SHIPPED | OUTPATIENT
Start: 2024-02-23

## 2024-02-23 NOTE — PROGRESS NOTES
Remi Olivares  tolerated treatment well with no complications.      Remi Olivares is aware of future appt on 04/08/2024 at 08:30 AM.     AVS printed and given to Remi Olivares:  No (Declined by Remi Olivares)

## 2024-02-26 ENCOUNTER — APPOINTMENT (OUTPATIENT)
Facility: HOSPITAL | Age: 59
End: 2024-02-26
Attending: RADIOLOGY
Payer: COMMERCIAL

## 2024-02-26 PROCEDURE — 77336 RADIATION PHYSICS CONSULT: CPT | Performed by: RADIOLOGY

## 2024-02-26 PROCEDURE — 77386 HB NTSTY MODUL RAD TX DLVR CPLX: CPT | Performed by: RADIOLOGY

## 2024-02-26 PROCEDURE — 77014 CHG CT GUIDANCE RADIATION THERAPY FLDS PLACEMENT: CPT | Performed by: RADIOLOGY

## 2024-03-11 ENCOUNTER — DOCUMENTATION (OUTPATIENT)
Dept: HEMATOLOGY ONCOLOGY | Facility: CLINIC | Age: 59
End: 2024-03-11

## 2024-03-11 ENCOUNTER — PATIENT OUTREACH (OUTPATIENT)
Dept: HEMATOLOGY ONCOLOGY | Facility: CLINIC | Age: 59
End: 2024-03-11

## 2024-03-11 NOTE — PROGRESS NOTES
Spoke with pts wife Nikia, confirmed scheduled CT, office visit and scheduled MRI. She stated she picked up the barium yesterday, and they are aware of the prep instructions.  She is aware of all scheduled appointments, and was thankful for the call

## 2024-03-14 ENCOUNTER — HOSPITAL ENCOUNTER (OUTPATIENT)
Dept: CT IMAGING | Facility: HOSPITAL | Age: 59
Discharge: HOME | End: 2024-03-14
Payer: COMMERCIAL

## 2024-03-14 DIAGNOSIS — C20 RECTAL ADENOCARCINOMA (HCC): ICD-10-CM

## 2024-03-14 PROCEDURE — 74177 CT ABD & PELVIS W/CONTRAST: CPT

## 2024-03-14 PROCEDURE — G1004 CDSM NDSC: HCPCS

## 2024-03-14 PROCEDURE — 71260 CT THORAX DX C+: CPT

## 2024-03-14 RX ADMIN — IOHEXOL 100 ML: 350 INJECTION, SOLUTION INTRAVENOUS at 07:21

## 2024-03-20 ENCOUNTER — TELEPHONE (OUTPATIENT)
Age: 59
End: 2024-03-20

## 2024-03-20 NOTE — TELEPHONE ENCOUNTER
Patients GI provider:  Dr. Aguilar    Number to return call: [phone]    Reason for call: Gokul from  Radiology called with significant findings on CT chest, AB, Pel from 03/14/24    Scheduled procedure/appointment date if applicable: Appt 03/29/24

## 2024-03-22 ENCOUNTER — TELEPHONE (OUTPATIENT)
Dept: GASTROENTEROLOGY | Facility: CLINIC | Age: 59
End: 2024-03-22

## 2024-03-22 NOTE — TELEPHONE ENCOUNTER
Received a message from his colorectal surgeon, had chemo/XRT for rectal mass, surgery is planned.    CT showed thickening in the stomach.  I spoke with wife.  Patient has never had an EGD.    Please arrange EGD with me ASAP at the [facility]. Okay to double book EGD with a combo if needed.  His surgery is planned in early May.

## 2024-03-25 ENCOUNTER — TELEPHONE (OUTPATIENT)
Dept: GASTROENTEROLOGY | Facility: CLINIC | Age: 59
End: 2024-03-25

## 2024-03-25 ENCOUNTER — PREP FOR PROCEDURE (OUTPATIENT)
Dept: GASTROENTEROLOGY | Facility: CLINIC | Age: 59
End: 2024-03-25

## 2024-03-25 DIAGNOSIS — K31.9 STOMACH PROBLEMS: Primary | ICD-10-CM

## 2024-03-25 NOTE — TELEPHONE ENCOUNTER
Scheduled date of EGD(as of today):4/15/2024  Physician performing EGD:dr newberry  Location of EGD:[facility]  Instructions reviewed with patient by:zeenat  Clearances: no    Egd instructions sent via [employer]

## 2024-03-29 ENCOUNTER — TELEMEDICINE (OUTPATIENT)
Facility: HOSPITAL | Age: 59
End: 2024-03-29
Attending: RADIOLOGY

## 2024-03-29 ENCOUNTER — TELEPHONE (OUTPATIENT)
Age: 59
End: 2024-03-29

## 2024-03-29 ENCOUNTER — OFFICE VISIT (OUTPATIENT)
Age: 59
End: 2024-03-29
Payer: COMMERCIAL

## 2024-03-29 ENCOUNTER — PREP FOR PROCEDURE (OUTPATIENT)
Age: 59
End: 2024-03-29

## 2024-03-29 VITALS — WEIGHT: 212 LBS | HEIGHT: 72 IN | BODY MASS INDEX: 28.71 KG/M2

## 2024-03-29 DIAGNOSIS — C20 RECTAL ADENOCARCINOMA (HCC): Primary | ICD-10-CM

## 2024-03-29 PROCEDURE — 99215 OFFICE O/P EST HI 40 MIN: CPT | Performed by: COLON & RECTAL SURGERY

## 2024-03-29 PROCEDURE — 99024 POSTOP FOLLOW-UP VISIT: CPT | Performed by: RADIOLOGY

## 2024-03-29 NOTE — H&P (VIEW-ONLY)
Colon and Rectal Surgery   Remi Olivares 59 y.o. male MRN: [id number]   Encounter: [id number]  03/29/24   4:21 PM          ASSESSMENT:    Remi returns today, he is approximately 1 month out from completion of chemoradiation therapy 2/26/24 neoadjuvant, for low/distal rectal cancer extending to the dentate line.  He is feeling quite well at this point, gaining some weight back, bowel habits are normalizing, the pain/tenesmus and spasm that he was having at diagnosis have resolved.  On examination today he has anterior scar palpated, I cannot palpate any true tumor, in comparison to the fungating lesion he had at the dentate line, this may represent a complete mucosal response.    We had prior discussed the location of his tumor and the likely need for abdominoperineal resection, permanent colostomy, we also discussed that standard recommendations would include surgical/radical resection even if an excellent response though watch/wait is becoming more and more utilized.    We had extended discussion on the surveillance with rectal examination/digital, and salvage surgery that can become more difficult and may not be as oncologically ideal as index operation if he opted for nonoperative treatment.  He would like to see what upcoming data including sigmoidoscopy with me and followup MRI show before he makes a final decision on operative resection.    We did review that operative resection is the only way that we can completely indicate extirpation of the disease and the lymph node burden, especially with regards to local recurrence, he does understand should he not engage with operative resection that he may have preventable recurrence that could lead to increased morbidity and even death from cancer.     PLAN:  -Follow-up MRI rectal cancer protocol is next week  -I will schedule him for sigmoidoscopy for better magnified view in the next couple weeks to investigate for complete mucosal response  -Following the studies  he will give me a final decision on operative resection, recommended operation would be robotic abdominoperineal resection most likely, in mid May.    -Dr. Quinones will be seeing him for EGD for abnormal findings on CT imaging at stomach  -He will be discussed in tumor conference  CC:  DO Dr. Gerardo Lantigua Dr., Dr., RN  Esme Fuentes  GI Tumor Conference      HPI  Remi Olivares is a 59 y.o. male is here today for a follow up to rectal cancer.     Completed chemoradiation on 2/26/24     Scheduled for MRI on 4/4/24    He had a CT scan on 3/14/24:  No metastatic lesions in the chest, abdomen and pelvis  Asymmetric wall thickening along the lateral aspect of the herniated stomach with apparent shouldering (2/92), which can represent prominent gastric wall rugae, adherent ingested material or gastric wall neoplasm. Potential nodular soft tissue component   versus adherent ingested material in the superior aspect of the hernia as well. Endoscopic assessment is recommended for further differentiation  Mild thickening of the underdistended urinary bladder wall, likely representing postradiation changes. Clinical correlation is recommended to exclude possibility of acute cystitis.    Lab Results   Component Value Date    CEA 4.1 (H) 01/19/2024      Lab Results   Component Value Date    WBC 5.45 02/23/2024    HGB 12.5 02/23/2024    HCT 37.3 02/23/2024     (H) 02/23/2024     02/23/2024      Lab Results   Component Value Date    SODIUM 136 02/23/2024    K 3.7 02/23/2024     02/23/2024    CO2 27 02/23/2024    AGAP 7 02/23/2024    BUN 15 02/23/2024    CREATININE 0.71 02/23/2024    GLUC 117 02/23/2024    CALCIUM 9.4 02/23/2024    AST 15 02/23/2024    ALT 28 02/23/2024    ALKPHOS 67 02/23/2024    TP 6.7 02/23/2024    TBILI 0.95 02/23/2024    EGFR 104 02/23/2024        Historical Information   Past Medical History:   Diagnosis Date    Diabetes mellitus (HCC)      Hyperlipidemia      Past Surgical History:   Procedure Laterality Date    GALLBLADDER SURGERY      IR PORT PLACEMENT  2023    TONSILLECTOMY      UMBILICAL HERNIA REPAIR         Meds/Allergies       Current Outpatient Medications:     buPROPion (WELLBUTRIN XL) 300 mg 24 hr tablet, Take 300 mg by mouth daily, Disp: , Rfl:     escitalopram (LEXAPRO) 20 mg tablet, Take 20 mg by mouth daily, Disp: , Rfl:     ibuprofen (MOTRIN) 200 mg tablet, Take 600 mg by mouth every 6 (six) hours, Disp: , Rfl:     lidocaine-prilocaine (EMLA) cream, Apply topically as needed for mild pain, Disp: 1 g, Rfl: 1    lisinopril (ZESTRIL) 5 mg tablet, Take 5 mg by mouth daily, Disp: , Rfl:     metFORMIN (GLUCOPHAGE) 1000 MG tablet, Take 1,000 mg by mouth 2 (two) times a day, Disp: , Rfl:     ondansetron (ZOFRAN) 8 mg tablet, Take one tablet by mouth every 8 hours an needed for nausea, Disp: 20 tablet, Rfl: 0    prochlorperazine (COMPAZINE) 10 mg tablet, Take 1 tablet (10 mg total) by mouth every 6 (six) hours as needed for nausea or vomiting, Disp: 30 tablet, Rfl: 0    rosuvastatin (CRESTOR) 20 MG tablet, Take 20 mg by mouth daily, Disp: , Rfl:     traMADol (Ultram) 50 mg tablet, Take 1 tablet (50 mg total) by mouth every 4 (four) hours as needed for moderate pain, Disp: 120 tablet, Rfl: 0      Allergies   Allergen Reactions    Atorvastatin Myalgia         Social History   Social History     Substance and Sexual Activity   Alcohol Use Never     Social History     Substance and Sexual Activity   Drug Use Never     Social History     Tobacco Use   Smoking Status Former    Current packs/day: 0.00    Average packs/day: 2.0 packs/day for 30.0 years (60.0 ttl pk-yrs)    Types: Cigarettes    Start date:     Quit date:     Years since quittin.2    Passive exposure: Past   Smokeless Tobacco Never         Family History:   Family History   Problem Relation Age of Onset    No Known Problems Mother     No Known Problems Father         Review of Systems   Constitutional: Negative.    HENT: Negative.     Eyes: Negative.    Respiratory: Negative.     Cardiovascular: Negative.    Gastrointestinal: Negative.    Endocrine: Negative.    Genitourinary: Negative.    Musculoskeletal: Negative.    Skin: Negative.    Allergic/Immunologic: Negative.    Neurological: Negative.    Hematological: Negative.    Psychiatric/Behavioral: Negative.         Objective     Current Vitals:   Vitals:    03/29/24 1448   Weight: 96.2 kg (212 lb)   Height: 6' (1.829 m)     Physical Exam:  General:no distress  Neck:supple  Pulm:no increased work of breathing, clear bilateral  CV:sinus  Abdomen:soft,nontender  Rectal:normal perianal skin/sphincter tone, minimal anterior scar however no masses palpated  Extremities:no edema

## 2024-03-29 NOTE — PROGRESS NOTES
Colon and Rectal Surgery   Remi Olivares 59 y.o. male MRN: [id number]   Encounter: [id number]  03/29/24   4:21 PM          ASSESSMENT:    Remi returns today, he is approximately 1 month out from completion of chemoradiation therapy 2/26/24 neoadjuvant, for low/distal rectal cancer extending to the dentate line.  He is feeling quite well at this point, gaining some weight back, bowel habits are normalizing, the pain/tenesmus and spasm that he was having at diagnosis have resolved.  On examination today he has anterior scar palpated, I cannot palpate any true tumor, in comparison to the fungating lesion he had at the dentate line, this may represent a complete mucosal response.    We had prior discussed the location of his tumor and the likely need for abdominoperineal resection, permanent colostomy, we also discussed that standard recommendations would include surgical/radical resection even if an excellent response though watch/wait is becoming more and more utilized.    We had extended discussion on the surveillance with rectal examination/digital, and salvage surgery that can become more difficult and may not be as oncologically ideal as index operation if he opted for nonoperative treatment.  He would like to see what upcoming data including sigmoidoscopy with me and followup MRI show before he makes a final decision on operative resection.    We did review that operative resection is the only way that we can completely indicate extirpation of the disease and the lymph node burden, especially with regards to local recurrence, he does understand should he not engage with operative resection that he may have preventable recurrence that could lead to increased morbidity and even death from cancer.     PLAN:  -Follow-up MRI rectal cancer protocol is next week  -I will schedule him for sigmoidoscopy for better magnified view in the next couple weeks to investigate for complete mucosal response  -Following the studies  he will give me a final decision on operative resection, recommended operation would be robotic abdominoperineal resection most likely, in mid May.    -Dr. Quinones will be seeing him for EGD for abnormal findings on CT imaging at stomach  -He will be discussed in tumor conference  CC:  DO Dr. Gerardo Lantigua Dr., Dr., RN  Esme Fuentes  GI Tumor Conference      HPI  Remi Olivares is a 59 y.o. male is here today for a follow up to rectal cancer.     Completed chemoradiation on 2/26/24     Scheduled for MRI on 4/4/24    He had a CT scan on 3/14/24:  No metastatic lesions in the chest, abdomen and pelvis  Asymmetric wall thickening along the lateral aspect of the herniated stomach with apparent shouldering (2/92), which can represent prominent gastric wall rugae, adherent ingested material or gastric wall neoplasm. Potential nodular soft tissue component   versus adherent ingested material in the superior aspect of the hernia as well. Endoscopic assessment is recommended for further differentiation  Mild thickening of the underdistended urinary bladder wall, likely representing postradiation changes. Clinical correlation is recommended to exclude possibility of acute cystitis.    Lab Results   Component Value Date    CEA 4.1 (H) 01/19/2024      Lab Results   Component Value Date    WBC 5.45 02/23/2024    HGB 12.5 02/23/2024    HCT 37.3 02/23/2024     (H) 02/23/2024     02/23/2024      Lab Results   Component Value Date    SODIUM 136 02/23/2024    K 3.7 02/23/2024     02/23/2024    CO2 27 02/23/2024    AGAP 7 02/23/2024    BUN 15 02/23/2024    CREATININE 0.71 02/23/2024    GLUC 117 02/23/2024    CALCIUM 9.4 02/23/2024    AST 15 02/23/2024    ALT 28 02/23/2024    ALKPHOS 67 02/23/2024    TP 6.7 02/23/2024    TBILI 0.95 02/23/2024    EGFR 104 02/23/2024        Historical Information   Past Medical History:   Diagnosis Date    Diabetes mellitus (HCC)      Hyperlipidemia      Past Surgical History:   Procedure Laterality Date    GALLBLADDER SURGERY      IR PORT PLACEMENT  2023    TONSILLECTOMY      UMBILICAL HERNIA REPAIR         Meds/Allergies       Current Outpatient Medications:     buPROPion (WELLBUTRIN XL) 300 mg 24 hr tablet, Take 300 mg by mouth daily, Disp: , Rfl:     escitalopram (LEXAPRO) 20 mg tablet, Take 20 mg by mouth daily, Disp: , Rfl:     ibuprofen (MOTRIN) 200 mg tablet, Take 600 mg by mouth every 6 (six) hours, Disp: , Rfl:     lidocaine-prilocaine (EMLA) cream, Apply topically as needed for mild pain, Disp: 1 g, Rfl: 1    lisinopril (ZESTRIL) 5 mg tablet, Take 5 mg by mouth daily, Disp: , Rfl:     metFORMIN (GLUCOPHAGE) 1000 MG tablet, Take 1,000 mg by mouth 2 (two) times a day, Disp: , Rfl:     ondansetron (ZOFRAN) 8 mg tablet, Take one tablet by mouth every 8 hours an needed for nausea, Disp: 20 tablet, Rfl: 0    prochlorperazine (COMPAZINE) 10 mg tablet, Take 1 tablet (10 mg total) by mouth every 6 (six) hours as needed for nausea or vomiting, Disp: 30 tablet, Rfl: 0    rosuvastatin (CRESTOR) 20 MG tablet, Take 20 mg by mouth daily, Disp: , Rfl:     traMADol (Ultram) 50 mg tablet, Take 1 tablet (50 mg total) by mouth every 4 (four) hours as needed for moderate pain, Disp: 120 tablet, Rfl: 0      Allergies   Allergen Reactions    Atorvastatin Myalgia         Social History   Social History     Substance and Sexual Activity   Alcohol Use Never     Social History     Substance and Sexual Activity   Drug Use Never     Social History     Tobacco Use   Smoking Status Former    Current packs/day: 0.00    Average packs/day: 2.0 packs/day for 30.0 years (60.0 ttl pk-yrs)    Types: Cigarettes    Start date:     Quit date:     Years since quittin.2    Passive exposure: Past   Smokeless Tobacco Never         Family History:   Family History   Problem Relation Age of Onset    No Known Problems Mother     No Known Problems Father         Review of Systems   Constitutional: Negative.    HENT: Negative.     Eyes: Negative.    Respiratory: Negative.     Cardiovascular: Negative.    Gastrointestinal: Negative.    Endocrine: Negative.    Genitourinary: Negative.    Musculoskeletal: Negative.    Skin: Negative.    Allergic/Immunologic: Negative.    Neurological: Negative.    Hematological: Negative.    Psychiatric/Behavioral: Negative.         Objective     Current Vitals:   Vitals:    03/29/24 1448   Weight: 96.2 kg (212 lb)   Height: 6' (1.829 m)     Physical Exam:  General:no distress  Neck:supple  Pulm:no increased work of breathing, clear bilateral  CV:sinus  Abdomen:soft,nontender  Rectal:normal perianal skin/sphincter tone, minimal anterior scar however no masses palpated  Extremities:no edema

## 2024-03-29 NOTE — PATIENT INSTRUCTIONS
ASSESSMENT:    Remi returns today, he is approximately 1 month out from completion of chemoradiation therapy 2/26/24 neoadjuvant, for low/distal rectal cancer extending to the dentate line.  He is feeling quite well at this point, gaining some weight back, bowel habits are normalizing, the pain/tenesmus and spasm that he was having at diagnosis have resolved.  On examination today he has anterior scar palpated, I cannot palpate any true tumor, in comparison to the fungating lesion he had at the dentate line, this may represent a complete mucosal response.    We had prior discussed the location of his tumor and the likely need for abdominoperineal resection, permanent colostomy, we also discussed that standard recommendations would include surgical/radical resection even if an excellent response though watch/wait is becoming more and more utilized.    We had extended discussion on the surveillance with rectal examination/digital, and salvage surgery that can become more difficult and may not be as oncologically ideal as index operation if he opted for nonoperative treatment.  He would like to see what upcoming data including sigmoidoscopy with me and followup MRI show before he makes a final decision on operative resection.    We did review that operative resection is the only way that we can completely indicate extirpation of the disease and the lymph node burden, especially with regards to local recurrence, he does understand should he not engage with operative resection that he may have preventable recurrence that could lead to increased morbidity and even death from cancer.     PLAN:  -Follow-up MRI rectal cancer protocol is next week  -I will schedule him for sigmoidoscopy for better magnified view in the next couple weeks to investigate for complete mucosal response  -Following the studies he will give me a final decision on operative resection, recommended operation would be robotic abdominoperineal  resection most likely, in mid May.    -Dr. Quinones will be seeing him for EGD for abnormal findings on CT imaging at stomach  -He will be discussed in tumor conference  CC:  Mildred Batista, DO Dr. Gerardo Kyle, JOSE Fuentes  GI Tumor Conference

## 2024-03-29 NOTE — LETTER
Remi Olivares  [address]  [facility] [address]        FLEXIBLE SIGMOIDOSCOPY WITH SEDATION INSTRUCTIONS  PLEASE NOTE…  AS OF JUNE 1, 2014, OUR OFFICE REQUIRES 48 HOURS NOTICE OF CANCELLATION/RESCHEDULE OF A PROCEDURE TO AVOID INCURRING A MISSED APPOINTMENT FEE.  Your Sigmoidoscopy Procedure has been scheduled at:  [facility].    The Date and Time of your Procedure is: April 10, 2024   Patient is to have nothing to eat or drink after midnight.    Also in preparation for this procedure, take 2 Fleet Enemas 2 hours prior to the appointment.  These enemas can be purchased over the counter in most pharmacies.  Follow the directions on the box.       You are to report to the GI Lab ([facility] Floor) ONE (1) HOUR PRIOR to your procedure.    Special instructions may be needed if you are taking aspirin or any aspirin-containing medication.  Check with your family physician.      Check with your family doctor if you are taking Coumadin (a blood thinner), Plavix, Gingko biloba, Ginseng, Feverfew, and/or Panda's Wort.  We suggest stopping these for 3 days.      If you are on DIABETIC MEDICATION (tablets or insulin) your doctor may make changes in your preparation.    Take all medications usual unless otherwise instructed.    As always, if you have any questions or concerns, feel free to call the office.  Our  staff will be happy to connect you with one of the nurses to answer any questions or address any concerns you have regarding your procedure.      Arrangements must be made for a responsible party to drive you home from the procedure.  If you do not have a responsible family member or friend to drive you home, the procedure will not be done.  Vast or a taxi is not acceptable.  It is important you notify our office of any insurance changes prior to your procedure and, if necessary, supply us with referrals from your primary care physician.

## 2024-03-29 NOTE — TELEPHONE ENCOUNTER
DE OV 3/29 rectal can f/u, s/p chemoradiation 2/26/24, BMI 28     Scheduled DE 4/10 [facility] GI   Handed PW to patient

## 2024-03-29 NOTE — PROGRESS NOTES
Virtual Brief Visit    Assessment/Plan:  Mr. Olivares is a 59 year old with the following history:  Oncology History Overview Note   with stage IIIC [id number] distal rectal adenocarcinoma and prostate involvement. He presented for consideration of radiotherapy as a component of total neoadjuvant therapy.  He completed initial FOLFOX and was referred for concurrent long course chemoradiation as a component of total neoadjuvant treatment. He completed a course of radiation therapy to the whole pelvis on 2/26/2024. He tolerated radiotherapy well without significant treatment-related toxicity. He is seen today for EOT radiation phone follow up.    3/14/24 CT CAP  IMPRESSION:   No metastatic lesions in the chest, abdomen and pelvis.     Asymmetric wall thickening along the lateral aspect of the herniated stomach with apparent shouldering (2/92), which can represent prominent gastric wall rugae, adherent ingested material or gastric wall neoplasm. Potential nodular soft tissue component   versus adherent ingested material in the superior aspect of the hernia as well. Endoscopic assessment is recommended for further differentiation     Mild thickening of the underdistended urinary bladder wall, likely representing postradiation changes. Clinical correlation is recommended to exclude possibility of acute cystitis.      Upcoming:  3/29/24 Dr Aguilar  4/4/24 MRI pelvis rectal cancer staging wo contrast  4/11/24 I [facility]  4/15/24 EGD w/Dr newberry at [facility]  4/22/24 Dr Carrillo     Rectal adenocarcinoma (HCC)   8/22/2023 Initial Diagnosis    Rectal adenocarcinoma (HCC)     8/22/2023 Biopsy    Final Diagnosis  F. Rectum, biopsy rectal mass:  - Adenocarcinoma, superficial fragments. See note.  - MMR panel is pending.     9/14/2023 -  Cancer Staged    Staging form: Colon and Rectum, AJCC 8th Edition  - Clinical stage from 9/14/2023: Stage IIIC (cT4b, cN1, cM0) - Signed by Aristides Carrillo MD on  9/14/2023  Stage prefix: Initial diagnosis  Histologic grade (G): GX  Histologic grading system: 4 grade system       9/25/2023 - 1/8/2024 Chemotherapy    alteplase (CATHFLO), 2 mg, Intracatheter, Every 1 Minute as needed, 8 of 8 cycles  fluorouracil (ADRUCIL), 400 mg/m2 = 875 mg, Intravenous, Once, 8 of 8 cycles  Administration: 875 mg (9/25/2023), 875 mg (10/9/2023), 875 mg (10/23/2023), 875 mg (11/6/2023), 875 mg (11/20/2023), 875 mg (12/4/2023), 875 mg (12/18/2023), 875 mg (1/2/2024)  leucovorin calcium IVPB, 876 mg, Intravenous, Once, 8 of 8 cycles  Administration: 900 mg (9/25/2023), 900 mg (10/9/2023), 900 mg (10/23/2023), 900 mg (11/6/2023), 900 mg (11/20/2023), 900 mg (12/4/2023), 900 mg (12/18/2023), 900 mg (1/2/2024)  oxaliplatin (ELOXATIN) chemo infusion, 85 mg/m2 = 186.15 mg, Intravenous, Once, 8 of 8 cycles  Administration: 186.15 mg (9/25/2023), 186.15 mg (10/9/2023), 186.15 mg (10/23/2023), 186.15 mg (11/6/2023), 186.15 mg (11/20/2023), 186.15 mg (12/4/2023), 186.15 mg (12/18/2023), 186.15 mg (1/2/2024)  fluorouracil (ADRUCIL) ambulatory infusion Soln, 1,200 mg/m2/day = 5,255 mg, Intravenous, Over 46 hours, 8 of 8 cycles     1/15/2024 -  Chemotherapy    alteplase (CATHFLO), 2 mg, Intracatheter, Every 1 Minute as needed, 5 of 5 cycles  fluorouracil (ADRUCIL) ambulatory infusion Soln, 225 mg/m2/day = 2,440 mg, Intravenous, Over 120 hours, 5 of 5 cycles      Radiation    Plan ID Energy Fractions Dose per Fraction (cGy) Dose Correction (cGy) Total Dose Delivered (cGy) Elapsed Days   CD Pelvis RA 6X 3 / 3 180 0 540 4   Wh Pelvis RA 6X 25 / 25 180 0 4,500 35      Treatment Dates:  1/16/2024 - 2/26/2024.        Upon interview, he endorses the above history.  He denies changes in bowel habits.  He has regular loose stools without blood.  He denies pain. He denies dysuria or irritative voiding.  He is without additional acute concerns.    Going forward, he has follow up with his surgeon, medical oncology and  GI.  CT chest/abdomen and pelvis did not demonstrate evidence of metastatic disease.  EGD is arranged to evaluate gastric thickening.  Post-treatment MRI has also been scheduled.  He will return in 3 months after TME.  He was encouraged to call with questions/concerns in the interim.    Problem List Items Addressed This Visit       Rectal adenocarcinoma (HCC) - Primary       Recent Visits  No visits were found meeting these conditions.  Showing recent visits within past 7 days and meeting all other requirements  Today's Visits  Date Type Provider Dept   03/29/24 Telemedicine Neo Tsang MD [facility]   Showing today's visits and meeting all other requirements  Future Appointments  No visits were found meeting these conditions.  Showing future appointments within next 150 days and meeting all other requirements         Visit Time  Total Visit Duration: 5 minutes.

## 2024-04-01 ENCOUNTER — DOCUMENTATION (OUTPATIENT)
Dept: HEMATOLOGY ONCOLOGY | Facility: CLINIC | Age: 59
End: 2024-04-01

## 2024-04-01 ENCOUNTER — ANESTHESIA EVENT (OUTPATIENT)
Dept: ANESTHESIOLOGY | Facility: AMBULATORY SURGERY CENTER | Age: 59
End: 2024-04-01

## 2024-04-01 ENCOUNTER — ANESTHESIA (OUTPATIENT)
Dept: ANESTHESIOLOGY | Facility: AMBULATORY SURGERY CENTER | Age: 59
End: 2024-04-01

## 2024-04-01 NOTE — PROGRESS NOTES
In-basket message received from Ronna Kyle RN to move patient to the [facility] on 4/25/2024. Chart reviewed and prep completed.

## 2024-04-04 ENCOUNTER — HOSPITAL ENCOUNTER (OUTPATIENT)
Dept: MRI IMAGING | Facility: HOSPITAL | Age: 59
End: 2024-04-04
Payer: COMMERCIAL

## 2024-04-04 DIAGNOSIS — C20 RECTAL ADENOCARCINOMA (HCC): ICD-10-CM

## 2024-04-04 PROCEDURE — G1004 CDSM NDSC: HCPCS

## 2024-04-04 PROCEDURE — 72195 MRI PELVIS W/O DYE: CPT

## 2024-04-08 ENCOUNTER — HOSPITAL ENCOUNTER (OUTPATIENT)
Dept: INFUSION CENTER | Facility: HOSPITAL | Age: 59
Discharge: HOME | End: 2024-04-08

## 2024-04-08 DIAGNOSIS — Z95.828 PORT-A-CATH IN PLACE: Primary | ICD-10-CM

## 2024-04-08 NOTE — PROGRESS NOTES
Remi Olivares  tolerated treatment well with no complications.      Remi Olivares is aware of future appt on 6/8 at 0800.     AVS printed and given to Remi Olivares:    No (Declined by Remi Olivares)

## 2024-04-10 ENCOUNTER — TELEPHONE (OUTPATIENT)
Age: 59
End: 2024-04-10

## 2024-04-10 ENCOUNTER — ANESTHESIA EVENT (OUTPATIENT)
Dept: GASTROENTEROLOGY | Facility: HOSPITAL | Age: 59
End: 2024-04-10

## 2024-04-10 ENCOUNTER — ANESTHESIA (OUTPATIENT)
Dept: GASTROENTEROLOGY | Facility: HOSPITAL | Age: 59
End: 2024-04-10

## 2024-04-10 ENCOUNTER — HOSPITAL ENCOUNTER (OUTPATIENT)
Dept: GASTROENTEROLOGY | Facility: HOSPITAL | Age: 59
Setting detail: OUTPATIENT SURGERY
Discharge: HOME | End: 2024-04-10
Attending: COLON & RECTAL SURGERY
Payer: COMMERCIAL

## 2024-04-10 VITALS
OXYGEN SATURATION: 94 % | RESPIRATION RATE: 20 BRPM | BODY MASS INDEX: 28.73 KG/M2 | SYSTOLIC BLOOD PRESSURE: 132 MMHG | WEIGHT: 212.08 LBS | TEMPERATURE: 97.2 F | HEART RATE: 83 BPM | DIASTOLIC BLOOD PRESSURE: 78 MMHG | HEIGHT: 72 IN

## 2024-04-10 DIAGNOSIS — C20 RECTAL ADENOCARCINOMA (HCC): ICD-10-CM

## 2024-04-10 LAB — GLUCOSE SERPL-MCNC: 214 MG/DL (ref 65–140)

## 2024-04-10 PROCEDURE — 82948 REAGENT STRIP/BLOOD GLUCOSE: CPT

## 2024-04-10 PROCEDURE — 45330 DIAGNOSTIC SIGMOIDOSCOPY: CPT | Performed by: COLON & RECTAL SURGERY

## 2024-04-10 RX ORDER — SODIUM CHLORIDE 9 MG/ML
INJECTION, SOLUTION INTRAVENOUS CONTINUOUS PRN
Status: DISCONTINUED | OUTPATIENT
Start: 2024-04-10 | End: 2024-04-10

## 2024-04-10 RX ORDER — SODIUM CHLORIDE, SODIUM LACTATE, POTASSIUM CHLORIDE, CALCIUM CHLORIDE 600; 310; 30; 20 MG/100ML; MG/100ML; MG/100ML; MG/100ML
50 INJECTION, SOLUTION INTRAVENOUS CONTINUOUS
Status: CANCELLED | OUTPATIENT
Start: 2024-04-10

## 2024-04-10 RX ORDER — LIDOCAINE HYDROCHLORIDE 10 MG/ML
INJECTION, SOLUTION EPIDURAL; INFILTRATION; INTRACAUDAL; PERINEURAL AS NEEDED
Status: DISCONTINUED | OUTPATIENT
Start: 2024-04-10 | End: 2024-04-10

## 2024-04-10 RX ORDER — SODIUM CHLORIDE, SODIUM LACTATE, POTASSIUM CHLORIDE, CALCIUM CHLORIDE 600; 310; 30; 20 MG/100ML; MG/100ML; MG/100ML; MG/100ML
50 INJECTION, SOLUTION INTRAVENOUS CONTINUOUS
Status: DISCONTINUED | OUTPATIENT
Start: 2024-04-10 | End: 2024-04-14 | Stop reason: HOSPADM

## 2024-04-10 RX ORDER — LIDOCAINE HYDROCHLORIDE 10 MG/ML
0.5 INJECTION, SOLUTION EPIDURAL; INFILTRATION; INTRACAUDAL; PERINEURAL ONCE AS NEEDED
Status: DISCONTINUED | OUTPATIENT
Start: 2024-04-10 | End: 2024-04-14 | Stop reason: HOSPADM

## 2024-04-10 RX ORDER — LIDOCAINE HYDROCHLORIDE 10 MG/ML
0.5 INJECTION, SOLUTION EPIDURAL; INFILTRATION; INTRACAUDAL; PERINEURAL ONCE AS NEEDED
Status: CANCELLED | OUTPATIENT
Start: 2024-04-10

## 2024-04-10 RX ORDER — PROPOFOL 10 MG/ML
INJECTION, EMULSION INTRAVENOUS AS NEEDED
Status: DISCONTINUED | OUTPATIENT
Start: 2024-04-10 | End: 2024-04-10

## 2024-04-10 RX ADMIN — PROPOFOL 150 MG: 10 INJECTION, EMULSION INTRAVENOUS at 10:42

## 2024-04-10 RX ADMIN — LIDOCAINE HYDROCHLORIDE 50 MG: 10 INJECTION, SOLUTION EPIDURAL; INFILTRATION; INTRACAUDAL; PERINEURAL at 10:42

## 2024-04-10 RX ADMIN — SODIUM CHLORIDE: 0.9 INJECTION, SOLUTION INTRAVENOUS at 10:37

## 2024-04-10 RX ADMIN — PROPOFOL 100 MG: 10 INJECTION, EMULSION INTRAVENOUS at 10:46

## 2024-04-10 NOTE — ANESTHESIA PREPROCEDURE EVALUATION
Procedure:  FLEXIBLE SIGMOIDOSCOPY    Relevant Problems   GI/HEPATIC   (+) Rectal adenocarcinoma (HCC)      Endocrine   (+) Diabetes mellitus (HCC)      Other   (+) Dyslipidemia     Denies recent fever, cough or other symptom of upper respiratory tract infection.    Confirmed NPO appropriate     Physical Exam    Airway    Mallampati score: II  TM Distance: >3 FB  Neck ROM: full     Dental   Comment: Eroded dentition     Cardiovascular      Pulmonary      Other Findings        Anesthesia Plan  ASA Score- 2     Anesthesia Type- IV sedation with anesthesia with ASA Monitors.         Additional Monitors:     Airway Plan:     Comment: I discussed the risks and benefits of IV sedation anesthesia including the possibility of the need to convert to general anesthesia and the potential risk of awareness.       Plan Factors-Exercise tolerance (METS): >4 METS.Exercise comment: Able to climb two flights of stairs without cardiopulmonary limitation.    Chart reviewed.   Existing labs reviewed.     Patient is not a current smoker.  Patient did not smoke on day of surgery.            Induction- intravenous.    Postoperative Plan-     Informed Consent- Anesthetic plan and risks discussed with patient.

## 2024-04-10 NOTE — ANESTHESIA POSTPROCEDURE EVALUATION
Post-Op Assessment Note    CV Status:  Stable  Pain Score: 0    Pain management: adequate       Mental Status:  Alert and awake   Hydration Status:  Euvolemic   PONV Controlled:  Controlled   Airway Patency:  Patent     Post Op Vitals Reviewed: Yes    No anethesia notable event occurred.    Staff: Anesthesiologist, CRNA               /65 (04/10/24 1052)    Temp (!) 97.2 °F (36.2 °C) (04/10/24 1052)    Pulse 88 (04/10/24 1052)   Resp 20 (04/10/24 1052)    SpO2 93 % (04/10/24 1052)

## 2024-04-10 NOTE — TELEPHONE ENCOUNTER
Patients GI provider:  Dr. Aguilar    Number to return call: [phone]    Reason for call: Pt's wife calling to sched 6wk f/u flexible sigmoidoscopy. Spoke w/Victorina, advised that pod to sched. Double-checked w/Rochelle advised office clerical to sched. Please call wife back to help sched 6wk f/u sigmoidoscopy.    Scheduled procedure/appointment date if applicable: Apt 06/21/2024

## 2024-04-10 NOTE — INTERVAL H&P NOTE
Sigmoidoscopy as discussed at office visit recheck post treatment rectal cancer/tumor status.  H&P reviewed. After examining the patient I find no changes in the patients condition since the H&P had been written.    Vitals:    04/10/24 1001   BP: 122/70   Pulse: 86   Resp: 18   Temp: 97.7 °F (36.5 °C)   SpO2: 96%

## 2024-04-11 ENCOUNTER — PREP FOR PROCEDURE (OUTPATIENT)
Age: 59
End: 2024-04-11

## 2024-04-11 ENCOUNTER — TELEPHONE (OUTPATIENT)
Age: 59
End: 2024-04-11

## 2024-04-11 DIAGNOSIS — C20 RECTAL ADENOCARCINOMA (HCC): Primary | ICD-10-CM

## 2024-04-11 NOTE — LETTER
Remi Olivares  [address]  [facility] [address]        FLEXIBLE SIGMOIDOSCOPY WITH SEDATION INSTRUCTIONS  PLEASE NOTE…  AS OF JUNE 1, 2014, OUR OFFICE REQUIRES 48 HOURS NOTICE OF CANCELLATION/RESCHEDULE OF A PROCEDURE TO AVOID INCURRING A MISSED APPOINTMENT FEE.  Your Sigmoidoscopy Procedure has been scheduled at:  [facility].    The Date of your Procedure is:May 28, 2024   Patient is to have nothing to eat or drink after midnight.    Also in preparation for this procedure, take 2 Fleet Enemas 2 hours prior to the appointment.  These enemas can be purchased over the counter in most pharmacies.  Follow the directions on the box.       You are to report to the GI Lab ([facility]) ONE (1) HOUR PRIOR to your procedure.    Special instructions may be needed if you are taking aspirin or any aspirin-containing medication.  Check with your family physician.      Check with your family doctor if you are taking Coumadin (a blood thinner), Plavix, Gingko biloba, Ginseng, Feverfew, and/or Panda's Wort.  We suggest stopping these for 3 days.      If you are on DIABETIC MEDICATION (tablets or insulin) your doctor may make changes in your preparation.    Take all medications usual unless otherwise instructed.    As always, if you have any questions or concerns, feel free to call the office.  Our  staff will be happy to connect you with one of the nurses to answer any questions or address any concerns you have regarding your procedure.      Arrangements must be made for a responsible party to drive you home from the procedure.  If you do not have a responsible family member or friend to drive you home, the procedure will not be done.  Vast or a taxi is not acceptable.  It is important you notify our office of any insurance changes prior to your procedure and, if necessary, supply us with referrals from your primary care physician.

## 2024-04-11 NOTE — TELEPHONE ENCOUNTER
Scheduled DE 5/28 [facility]   Mailed PW to patient     ----- Message from Eleazar Aguilar MD sent at 4/10/2024 10:56 AM EDT -----  6-8 weeks same repeat FS pls

## 2024-04-15 ENCOUNTER — ANESTHESIA (OUTPATIENT)
Dept: GASTROENTEROLOGY | Facility: AMBULATORY SURGERY CENTER | Age: 59
End: 2024-04-15

## 2024-04-15 ENCOUNTER — ANESTHESIA EVENT (OUTPATIENT)
Dept: GASTROENTEROLOGY | Facility: AMBULATORY SURGERY CENTER | Age: 59
End: 2024-04-15

## 2024-04-15 ENCOUNTER — HOSPITAL ENCOUNTER (OUTPATIENT)
Dept: GASTROENTEROLOGY | Facility: AMBULATORY SURGERY CENTER | Age: 59
Discharge: HOME | End: 2024-04-15
Payer: COMMERCIAL

## 2024-04-15 VITALS
OXYGEN SATURATION: 97 % | SYSTOLIC BLOOD PRESSURE: 113 MMHG | TEMPERATURE: 97.4 F | HEIGHT: 72 IN | RESPIRATION RATE: 16 BRPM | DIASTOLIC BLOOD PRESSURE: 72 MMHG | BODY MASS INDEX: 28.71 KG/M2 | WEIGHT: 212 LBS | HEART RATE: 75 BPM

## 2024-04-15 DIAGNOSIS — K22.2 ESOPHAGEAL STRICTURE: Primary | ICD-10-CM

## 2024-04-15 DIAGNOSIS — K31.9 STOMACH PROBLEMS: ICD-10-CM

## 2024-04-15 PROCEDURE — 43249 ESOPH EGD DILATION <30 MM: CPT | Performed by: INTERNAL MEDICINE

## 2024-04-15 PROCEDURE — 43239 EGD BIOPSY SINGLE/MULTIPLE: CPT | Performed by: INTERNAL MEDICINE

## 2024-04-15 PROCEDURE — 88305 TISSUE EXAM BY PATHOLOGIST: CPT | Performed by: STUDENT IN AN ORGANIZED HEALTH CARE EDUCATION/TRAINING PROGRAM

## 2024-04-15 RX ORDER — PROPOFOL 10 MG/ML
INJECTION, EMULSION INTRAVENOUS AS NEEDED
Status: DISCONTINUED | OUTPATIENT
Start: 2024-04-15 | End: 2024-04-15

## 2024-04-15 RX ORDER — OMEPRAZOLE 40 MG/1
40 CAPSULE, DELAYED RELEASE ORAL DAILY
Qty: 30 CAPSULE | Refills: 2 | Status: SHIPPED | OUTPATIENT
Start: 2024-04-15

## 2024-04-15 RX ORDER — LIDOCAINE HYDROCHLORIDE 20 MG/ML
INJECTION, SOLUTION EPIDURAL; INFILTRATION; INTRACAUDAL; PERINEURAL AS NEEDED
Status: DISCONTINUED | OUTPATIENT
Start: 2024-04-15 | End: 2024-04-15

## 2024-04-15 RX ORDER — SODIUM CHLORIDE, SODIUM LACTATE, POTASSIUM CHLORIDE, CALCIUM CHLORIDE 600; 310; 30; 20 MG/100ML; MG/100ML; MG/100ML; MG/100ML
50 INJECTION, SOLUTION INTRAVENOUS CONTINUOUS
Status: DISCONTINUED | OUTPATIENT
Start: 2024-04-15 | End: 2024-04-19 | Stop reason: HOSPADM

## 2024-04-15 RX ORDER — SODIUM CHLORIDE, SODIUM LACTATE, POTASSIUM CHLORIDE, CALCIUM CHLORIDE 600; 310; 30; 20 MG/100ML; MG/100ML; MG/100ML; MG/100ML
50 INJECTION, SOLUTION INTRAVENOUS CONTINUOUS
Status: CANCELLED | OUTPATIENT
Start: 2024-04-15

## 2024-04-15 RX ADMIN — SODIUM CHLORIDE, SODIUM LACTATE, POTASSIUM CHLORIDE, CALCIUM CHLORIDE: 600; 310; 30; 20 INJECTION, SOLUTION INTRAVENOUS at 09:38

## 2024-04-15 RX ADMIN — SODIUM CHLORIDE, SODIUM LACTATE, POTASSIUM CHLORIDE, CALCIUM CHLORIDE 50 ML/HR: 600; 310; 30; 20 INJECTION, SOLUTION INTRAVENOUS at 09:41

## 2024-04-15 RX ADMIN — LIDOCAINE HYDROCHLORIDE 100 MG: 20 INJECTION, SOLUTION EPIDURAL; INFILTRATION; INTRACAUDAL; PERINEURAL at 09:54

## 2024-04-15 RX ADMIN — PROPOFOL 50 MG: 10 INJECTION, EMULSION INTRAVENOUS at 09:57

## 2024-04-15 RX ADMIN — PROPOFOL 150 MG: 10 INJECTION, EMULSION INTRAVENOUS at 09:54

## 2024-04-15 RX ADMIN — PROPOFOL 50 MG: 10 INJECTION, EMULSION INTRAVENOUS at 09:59

## 2024-04-15 RX ADMIN — PROPOFOL 50 MG: 10 INJECTION, EMULSION INTRAVENOUS at 10:01

## 2024-04-15 NOTE — H&P
History and Physical - [facility]  Remi Olivares 59 y.o. male MRN: [id number]    HPI: Remi Olivares is a 59 y.o. year old male who presents for abnormal CT of the esophagus    REVIEW OF SYSTEMS: Per the HPI, and otherwise unremarkable.    Historical Information   Past Medical History:   Diagnosis Date    Cancer (HCC)     rectal gone with treatment    Diabetes mellitus (HCC)     Hyperlipidemia      Past Surgical History:   Procedure Laterality Date    COLONOSCOPY      GALLBLADDER SURGERY      IR PORT PLACEMENT  2023    TONSILLECTOMY      UMBILICAL HERNIA REPAIR       Social History   Social History     Substance and Sexual Activity   Alcohol Use Never     Social History     Substance and Sexual Activity   Drug Use Never     Social History     Tobacco Use   Smoking Status Former    Current packs/day: 0.00    Average packs/day: 2.0 packs/day for 30.0 years (60.0 ttl pk-yrs)    Types: Cigarettes    Start date:     Quit date:     Years since quittin.2    Passive exposure: Past   Smokeless Tobacco Never     Family History   Problem Relation Age of Onset    No Known Problems Mother     No Known Problems Father        Meds/Allergies       Current Outpatient Medications:     buPROPion (WELLBUTRIN XL) 300 mg 24 hr tablet    escitalopram (LEXAPRO) 20 mg tablet    lisinopril (ZESTRIL) 5 mg tablet    metFORMIN (GLUCOPHAGE) 1000 MG tablet    rosuvastatin (CRESTOR) 20 MG tablet    ibuprofen (MOTRIN) 200 mg tablet    lidocaine-prilocaine (EMLA) cream    ondansetron (ZOFRAN) 8 mg tablet    prochlorperazine (COMPAZINE) 10 mg tablet    traMADol (Ultram) 50 mg tablet    Current Facility-Administered Medications:     lactated ringers infusion, 50 mL/hr, Intravenous, Continuous, 50 mL/hr at 04/15/24 0941    Allergies   Allergen Reactions    Atorvastatin Myalgia       Objective     /77   Pulse 82   Temp (!) 97.4 °F (36.3 °C) (Temporal)   Resp 20   Ht 6' (1.829 m)   Wt 96.2 kg (212  lb)   SpO2 99%   BMI 28.75 kg/m²     PHYSICAL EXAM    Gen: NAD AAOx3  Head: Normocephalic, Atraumatic  CV: S1S2 RRR no m/r/g  CHEST: Clear b/l no c/r/w  ABD: soft, +BS NT/ND  EXT: no edema    ASSESSMENT/PLAN:  This is a 59 y.o. year old male here for EGD, and he is stable and optimized for his procedure.

## 2024-04-15 NOTE — ANESTHESIA POSTPROCEDURE EVALUATION
Post-Op Assessment Note    CV Status:  Stable  Pain Score: 0    Pain management: adequate       Mental Status:  Alert and awake   Hydration Status:  Euvolemic   PONV Controlled:  Controlled   Airway Patency:  Patent     Post Op Vitals Reviewed: Yes    No anethesia notable event occurred.    Staff: CRNA               BP   107/71   Temp      Pulse  86   Resp   22   SpO2   95

## 2024-04-15 NOTE — ANESTHESIA PREPROCEDURE EVALUATION
Procedure:  EGD    Relevant Problems   GI/HEPATIC   (+) Rectal adenocarcinoma (HCC)     Denies recent fever, cough or other symptom of upper respiratory tract infection.    Confirmed NPO appropriate     Physical Exam    Airway    Mallampati score: II  TM Distance: >3 FB  Neck ROM: full     Dental   Comment: Eroded dentition     Cardiovascular      Pulmonary      Other Findings        Anesthesia Plan  ASA Score- 2     Anesthesia Type- IV sedation with anesthesia with ASA Monitors.         Additional Monitors:     Airway Plan:            Plan Factors-Exercise tolerance (METS): >4 METS.Exercise comment: Able to climb two flights of stairs without cardiopulmonary limitation.    Chart reviewed.   Existing labs reviewed.     Patient is not a current smoker.  Patient did not smoke on day of surgery.            Induction- intravenous.    Postoperative Plan-     Informed Consent- Anesthetic plan and risks discussed with patient.

## 2024-04-18 LAB
BASOPHILS # BLD AUTO: 0 X10E3/UL (ref 0–0.2)
BASOPHILS NFR BLD AUTO: 0 %
EOSINOPHIL # BLD AUTO: 0.1 X10E3/UL (ref 0–0.4)
EOSINOPHIL NFR BLD AUTO: 2 %
ERYTHROCYTE [DISTWIDTH] IN BLOOD BY AUTOMATED COUNT: 13.2 % (ref 11.6–15.4)
HCT VFR BLD AUTO: 42.5 % (ref 37.5–51)
HGB BLD-MCNC: 14 G/DL (ref 13–17.7)
IMM GRANULOCYTES # BLD: 0 X10E3/UL (ref 0–0.1)
IMM GRANULOCYTES NFR BLD: 1 %
LYMPHOCYTES # BLD AUTO: 0.6 X10E3/UL (ref 0.7–3.1)
LYMPHOCYTES NFR BLD AUTO: 14 %
MCH RBC QN AUTO: 33.5 PG (ref 26.6–33)
MCHC RBC AUTO-ENTMCNC: 32.9 G/DL (ref 31.5–35.7)
MCV RBC AUTO: 102 FL (ref 79–97)
MONOCYTES # BLD AUTO: 0.5 X10E3/UL (ref 0.1–0.9)
MONOCYTES NFR BLD AUTO: 11 %
NEUTROPHILS # BLD AUTO: 3.3 X10E3/UL (ref 1.4–7)
NEUTROPHILS NFR BLD AUTO: 72 %
PLATELET # BLD AUTO: 241 X10E3/UL (ref 150–450)
RBC # BLD AUTO: 4.18 X10E6/UL (ref 4.14–5.8)
WBC # BLD AUTO: 4.6 X10E3/UL (ref 3.4–10.8)

## 2024-04-18 PROCEDURE — 88305 TISSUE EXAM BY PATHOLOGIST: CPT | Performed by: STUDENT IN AN ORGANIZED HEALTH CARE EDUCATION/TRAINING PROGRAM

## 2024-04-19 ENCOUNTER — TELEPHONE (OUTPATIENT)
Dept: GASTROENTEROLOGY | Facility: CLINIC | Age: 59
End: 2024-04-19

## 2024-04-19 NOTE — RESULT ENCOUNTER NOTE
Left voicemail, no EGD recall.  Recommended PPI for 1 month.  Requested call back for any new GI symptoms.

## 2024-04-22 ENCOUNTER — OFFICE VISIT (OUTPATIENT)
Dept: HEMATOLOGY ONCOLOGY | Facility: CLINIC | Age: 59
End: 2024-04-22
Payer: COMMERCIAL

## 2024-04-22 VITALS
OXYGEN SATURATION: 97 % | TEMPERATURE: 98 F | HEIGHT: 72 IN | SYSTOLIC BLOOD PRESSURE: 128 MMHG | RESPIRATION RATE: 17 BRPM | WEIGHT: 217 LBS | DIASTOLIC BLOOD PRESSURE: 74 MMHG | BODY MASS INDEX: 29.39 KG/M2 | HEART RATE: 99 BPM

## 2024-04-22 DIAGNOSIS — Z95.828 PORT-A-CATH IN PLACE: ICD-10-CM

## 2024-04-22 DIAGNOSIS — R97.8 ABNORMAL TUMOR MARKERS: ICD-10-CM

## 2024-04-22 DIAGNOSIS — E78.5 DYSLIPIDEMIA: ICD-10-CM

## 2024-04-22 DIAGNOSIS — G47.00 INSOMNIA, UNSPECIFIED TYPE: ICD-10-CM

## 2024-04-22 DIAGNOSIS — C20 RECTAL ADENOCARCINOMA (HCC): Primary | ICD-10-CM

## 2024-04-22 DIAGNOSIS — C77.9 REGIONAL LYMPH NODE METASTASIS PRESENT (HCC): ICD-10-CM

## 2024-04-22 PROCEDURE — 99214 OFFICE O/P EST MOD 30 MIN: CPT | Performed by: INTERNAL MEDICINE

## 2024-04-22 NOTE — PROGRESS NOTES
HPI: Continuation of care for low rectal adenocarcinoma diagnosed in August 2023 and patient received total neoadjuvant therapy, FOLFOX followed by 5-FU infusion and concurrent radiation and recently patient had CT scans, MRI pelvis, sigmoidoscopy and EGD and there was no documented malignancy.  Patient states he was given option of no surgery versus surgery and he opted not to have surgery.  His sigmoidoscopy will be repeated in 6 weeks.  He will have blood counts, chemistry and CEA once every 12 weeks and visit in 4 months.  Port flush every 6 weeks  Patient is feeling well.  Occasionally mild diarrhea.  No other GI symptoms.  No rectal pain.  No rectal bleeding.  No weight loss.  Good appetite.      A. Colon, random colon biopsy:  - Fragments of colonic mucosa with increased intraepithelial lymphocytes and focal surface epithelial damage.  - There is no increase in subepithelial collagen thickness. See note.     Note (A): The above morphologic features may be compatible with lymphocytic (microscopic) colitis in conjunction with appropriate clinical and endoscopic findings.       B. Large Intestine, Transverse Colon, hot snare transverse colon polyp:  - Tubular adenoma, fragments.  - Negative for high grade dysplasia/ carcinoma.     C. Large Intestine, Left/Descending Colon, hot snare descending colon polyp:  - Benign colonic mucosa without significant microscopic abnormality.     D. Large Intestine, Sigmoid Colon, hot snare mid sigmoid polyp:  - Tubular adenoma.  - Negative for high grade dysplasia/ carcinoma.     E. Large Intestine, Sigmoid Colon, distal sigmoid polyps- hot snare  x1 / cold snare  2:  - Tubular adenoma x 2, negative for high grade dysplasia/ carcinoma.  - Hyperplastic polyp, negative for dysplasia/ carcinoma.      F. Rectum, biopsy rectal mass:  - Adenocarcinoma, superficial fragments. See note.  - MMR panel is pending.     Note (F): This case was reviewed at the intradepartmental quality  assurance conference.   Dr. Quinones is notified of the diagnosis in [facility] via [employer] on 2023  at 1.30 pm.   Electronically signed by Griffin Stevenson MD on 2023 at  1:41 PM     .Antibody          Clone               Description                           Results  MLH1               M1                   Mismatch repair protein       Intact nuclear expression  MSH2              [id number]       Mismatch repair protein       Intact nuclear expression  MSH6              44                     Mismatch repair protein       Intact nuclear expression  PMS2              [id number]           Mismatch repair protein       Intact nuclear expression     Historical Information   Past Medical History:   Diagnosis Date   • Cancer (HCC)     rectal gone with treatment   • Diabetes mellitus (HCC)    • Hyperlipidemia      Past Surgical History:   Procedure Laterality Date   • COLONOSCOPY     • GALLBLADDER SURGERY     • IR PORT PLACEMENT  2023   • TONSILLECTOMY     • UMBILICAL HERNIA REPAIR       Social History   Social History     Substance and Sexual Activity   Alcohol Use Never     Social History     Substance and Sexual Activity   Drug Use Never     Social History     Tobacco Use   Smoking Status Former   • Current packs/day: 0.00   • Average packs/day: 2.0 packs/day for 30.0 years (60.0 ttl pk-yrs)   • Types: Cigarettes   • Start date:    • Quit date:    • Years since quittin.3   • Passive exposure: Past   Smokeless Tobacco Never     Family History:   Family History   Problem Relation Age of Onset   • No Known Problems Mother    • No Known Problems Father          Current Outpatient Medications:   •  buPROPion (WELLBUTRIN XL) 300 mg 24 hr tablet, Take 300 mg by mouth daily, Disp: , Rfl:   •  escitalopram (LEXAPRO) 20 mg tablet, Take 20 mg by mouth daily, Disp: , Rfl:   •  ibuprofen (MOTRIN) 200 mg tablet, Take 600 mg by mouth every 6 (six) hours, Disp: , Rfl:   •  lidocaine-prilocaine  (EMLA) cream, Apply topically as needed for mild pain, Disp: 1 g, Rfl: 1  •  lisinopril (ZESTRIL) 5 mg tablet, Take 5 mg by mouth daily, Disp: , Rfl:   •  metFORMIN (GLUCOPHAGE) 1000 MG tablet, Take 1,000 mg by mouth 2 (two) times a day, Disp: , Rfl:   •  omeprazole (PriLOSEC) 40 MG capsule, Take 1 capsule (40 mg total) by mouth daily, Disp: 30 capsule, Rfl: 2  •  ondansetron (ZOFRAN) 8 mg tablet, Take one tablet by mouth every 8 hours an needed for nausea, Disp: 20 tablet, Rfl: 0  •  prochlorperazine (COMPAZINE) 10 mg tablet, Take 1 tablet (10 mg total) by mouth every 6 (six) hours as needed for nausea or vomiting, Disp: 30 tablet, Rfl: 0  •  rosuvastatin (CRESTOR) 20 MG tablet, Take 20 mg by mouth daily, Disp: , Rfl:   •  traMADol (Ultram) 50 mg tablet, Take 1 tablet (50 mg total) by mouth every 4 (four) hours as needed for moderate pain, Disp: 120 tablet, Rfl: 0    Allergies   Allergen Reactions   • Atorvastatin Myalgia   ROS:  09/13/23 Reviewed 12 systems: See symptoms in HPI  Presently no other neurological, cardiac, pulmonary, GI and  symptoms other than listed in HPI.  Other symptoms are in HPI.  No symptoms like fever, chills, bleeding, bone pains, skin rash, weight loss, night sweats, arthritic symptoms,  tiredness , weakness, numbness, claudication and gait problem. No frequent infections.  Not unusually sensitive to heat or cold. No swelling of the ankles. No swollen glands.  Patient is much less anxious.     Physical Exam:  Vitals:    04/22/24 1357   BP: 128/74   BP Location: Left arm   Patient Position: Sitting   Cuff Size: Adult   Pulse: 99   Resp: 17   Temp: 98 °F (36.7 °C)   SpO2: 97%   Weight: 98.4 kg (217 lb)   Height: 6' (1.829 m)   Patient is alert and oriented.  Patient is not in distress.  Vital signs are above.  There is no icterus.  There is no oral thrush.  There is no palpable neck mass.  Lung fields are clear to percussion auscultation.  Heart rate is regular.  No palpable abdominal  mass.  Soft and nontender abdomen.  There is no ascites.  There is no edema of the ankles.  There is no calf tenderness.  There is no focal neurological deficit.  No skin rash.  No palpable lymphadenopathy in the neck and axillary areas,  no clubbing.   Patient is anxious.  Performance status 0 .      Lab Results: I have reviewed all pertinent labs.  LABS:    Results for orders placed or performed during the hospital encounter of 04/15/24   Tissue Exam   Result Value Ref Range    Case Report       Surgical Pathology Report                         Case: [id number]                                  Authorizing Provider:  Yannick Quinones DO          Collected:           04/15/2024 1006              Ordering Location:     [facility] Received:            04/16/2024 1814              Pathologist:           Daniele Lorenz DO                                                          Specimen:    Esophagus, biopsy stricture ge junction                                                    Final Diagnosis       A. Esophagus, stricture GE junction, biopsy:  - Squamocolumnar junctional mucosa with reactive changes, focal chronic active gastritis, and rare squamous intraepithelial eosinophils, consistent with reflux esophagitis.  - Negative for intestinal metaplasia, dysplasia, and malignancy.         Additional Information       All reported additional testing was performed with appropriately reactive controls.  These tests were developed and their performance characteristics determined by [facility] or appropriate performing facility, though some tests may be performed on tissues which have not been validated for performance characteristics (such as staining performed on alcohol exposed cell blocks and decalcified tissues).  Results should be interpreted with caution and in the context of the patients’ clinical condition. These tests may not be cleared or approved by the U.S. Food and Drug  "Administration, though the FDA has determined that such clearance or approval is not necessary. These tests are used for clinical purposes and they should not be regarded as investigational or for research. This laboratory has been approved by CLIA 88, designated as a high-complexity laboratory and is qualified to perform these tests.    Interpretation performed at [facility] [address] [place] [address] Way, [place] [address].      Gross Description       A. The specimen is received in formalin, labeled with the patient's name and hospital number, and is designated \" biopsy stricture GE junction\".  It consists of a 1.2 x 0.6 x 0.1 cm aggregate of tan-pink tissue fragments.  Entirely submitted in a screen cassette.    Note: The estimated total formalin fixation time based upon information provided by the submitting clinician and the standard processing schedule is under 72 hours.  ESorrentino       CBC and differential  Status: Final result      Contains abnormal data CBC and differential  Order: [id number]   Status: Final result       Visible to patient: Yes (seen)       Next appt: 05/28/2024 at 02:45 PM in Gastroenterology (Eleazar Aguilar MD)    0 Result Notes            Component  Ref Range & Units 4/17/24  6:53 AM 1/12/24  7:39 AM 12/28/23  6:45 AM 12/14/23  6:33 AM 11/30/23  6:38 AM 11/16/23  6:41 AM 11/2/23  6:46 AM   White Blood Cell Count  3.4 - 10.8 x10E3/uL 4.6 3.8 7.4 6.1 6.1 6.4 6.0   Red Blood Cell Count  4.14 - 5.80 x10E6/uL 4.18 3.76 Low  4.21 4.20 4.23 4.46 4.32   Hemoglobin  13.0 - 17.7 g/dL 14.0 13.2 14.7 14.2 14.4 14.9 14.5   HCT  37.5 - 51.0 % 42.5 38.0 42.0 41.5 41.9 42.6 42.2   MCV  79 - 97 fL 102 High  101 High  100 High  99 High  99 High  96 98 High    MCH  26.6 - 33.0 pg 33.5 High  35.1 High  34.9 High  33.8 High  34.0 High  33.4 High  33.6 High    MCHC  31.5 - 35.7 g/dL 32.9 34.7 35.0 34.2 34.4 35.0 34.4   RDW  11.6 - 15.4 % 13.2 16.4 High  16.7 High  16.6 High  16.2 High  14.9 14.4 "   Platelet Count  150 - 450 x10E3/uL 241 113 Low  181 170 168 184 185   Neutrophils  Not Estab. % 72 36 36 38 39 34 35   Lymphocytes  Not Estab. % 14 51 50 47 45 52 50   Monocytes  Not Estab. % 11 9 10 11 13 11 11   Eosinophils  Not Estab. % 2 2 1 1 1 1 2   Basophils PCT  Not Estab. % 0 1 1 1 1 1 1   Neutrophils (Absolute)  1.4 - 7.0 x10E3/uL 3.3 1.4 2.6 2.3 2.4 2.2 2.1   Lymphocytes (Absolute)  0.7 - 3.1 x10E3/uL 0.6 Low  2.0 3.8 High  2.9 2.8 3.3 High  3.0   Monocytes (Absolute)  0.1 - 0.9 x10E3/uL 0.5 0.4 0.7 0.7 0.8 0.7 0.7   Eosinophils (Absolute)  0.0 - 0.4 x10E3/uL 0.1 0.1 0.1 0.1 0.0 0.1 0.1   Basophils ABS  0.0 - 0.2 x10E3/uL 0.0 0.0 0.1 0.0 0.0 0.0 0.0   Immature Granulocytes  Not Estab. % 1 1 2 2 1 1 1   Immature Granulocytes (Absolute)  0.0 - 0.1 x10E3/uL 0.0 0.0 0.1 0.1 0.1 0.1 0.1   Nucleated Red Blood Cells  1 High  R 1 High  R 1 High  R 1 High  R        Comprehensive metabolic panel  Status: Final result     Comprehensive metabolic panel  Order: [id number]   Status: Final result       Visible to patient: Yes (seen)       Next appt: 05/28/2024 at 02:45 PM in Gastroenterology (Eleazar Aguilar MD)       Dx: Rectal adenocarcinoma (HCC); Chemothe...    0 Result Notes       1 HM Topic            Component  Ref Range & Units 2/23/24  2:55 PM 2/16/24  3:06 PM 2/9/24  3:09 PM 2/2/24  3:17 PM 1/26/24  3:40 PM 1/19/24  3:00 PM 1/12/24  7:39 AM   Sodium  135 - 147 mmol/L 136 135 136 135 133 Low  132 Low  141 R   Potassium  3.5 - 5.3 mmol/L 3.7 3.7 3.6 3.9 3.8 4.5 4.0 R   Chloride  96 - 108 mmol/L 102 102 102 102 101 99 104 R   CO2  21 - 32 mmol/L 27 24 26 25 25 25 22 R   ANION GAP  mmol/L 7 9 8 8 7 8    BUN  5 - 25 mg/dL 15 17 14 22 17 26 High  13 R   Creatinine  0.60 - 1.30 mg/dL 0.71 0.79 CM 0.67 CM 0.66 CM 0.65 CM 0.80 CM 0.71 Low  R   Comment: Standardized to IDMS reference method   Glucose  65 - 140 mg/dL 117 153 High   High   High   High   High   High  R   Comment: If the  patient is fasting, the ADA then defines impaired fasting glucose as > 100 mg/dL and diabetes as > or equal to 123 mg/dL.   Calcium  8.4 - 10.2 mg/dL 9.4 9.8 9.4 9.1 9.3 9.4    AST  13 - 39 U/L 15 15 15 14 24 27 32 R   ALT  7 - 52 U/L 28 26 CM 25 CM 22 CM 36 CM 57 High  CM 64 High  R   Comment: Specimen collection should occur prior to Sulfasalazine administration due to the potential for falsely depressed results.   Alkaline Phosphatase  34 - 104 U/L 67 78 72 76 80 80    Total Protein  6.4 - 8.4 g/dL 6.7 7.0 6.7 6.8 6.7 6.7 5.7 Low  R   Albumin  3.5 - 5.0 g/dL 4.1 4.2 4.0 4.0 3.8 4.0 4.0 R   Total Bilirubin  0.20 - 1.00 mg/dL 0.95 1.09 High  CM 0.81 CM 0.86 CM 0.70 CM 1.05 High  CM 0.5 R   Comment: Use of this assay is not recommended for patients undergoing treatment with eltrombopag due to the potential for falsely elevated results.  N-acetyl-p-benzoquinone imine (metabolite of Acetaminophen) will generate erroneously low results in samples for patients that have taken an overdose of Acetaminophen.   eGFR  ml/min/1.73sq m 104 100 107 108 108 99 108 R                            Component  Ref Range & Units 1/19/24  3:00 PM 11/16/23  6:41 AM 9/22/23  7:47 AM 9/5/23  6:57 AM   CEA  0.0 - 3.0 ng/mL 4.1 High  5.1 High  R, CM 33.4 High  R, CM 36.2 High  R, CM   Comment: Normal/Non-Smoker: 0.0-3.0 ng/mL                             Imaging Studies: I have personally reviewed pertinent reports.    IMPRESSION:  Unenhanced MRI of the Pelvis (Rectal Protocol)     Low rectal cancer, 3.5 cm from the anal verge, 6.3 cm long, centered along the anterior rectal wall. Anteriorly, the tumor invades the right apex of the prostate (series 5 images 26-27.)     Overall MRI stage: T4b, N1, Low rectal cancer  MRF: Not applicable for T4 tumor.  Sphincter involvement: Yes. There is internal sphincter invasion in the posterior half of the upper internal sphincter (series 8 images 14-15, and series 2 image 13.)  Suspicious extra mesorectal  lymph nodes: There is a suspicious superior rectal chain 9 mm node (series 4 image 13 and series 7 image 13.)  EMVI: No     For the purpose of radiation therapy planning, series 5 and 7 would be most useful. (Series 5 is a small field of view axial T2 weighted sequence typically most useful for radiation therapy planning, however the superior rectal chain node was not   included on this sequence, and is better seen on series 7.)                 Workstation performed: [id number]        Imaging    MRI pelvis rectal cancer staging wo contrast (Order: [id number]) - 9/11/2023  IMPRESSION:     No evidence of metastatic disease in the chest, abdomen, and pelvis.     Please see subsequent scheduled rectal cancer protocol pelvic MRI for evaluation of local tumor staging.                 Workstation performed: [id number]        Imaging    CT chest abdomen pelvis w contrast (Order: [id number]) - 9/7/2023    Pathology, and Other Studies: I have personally reviewed pertinent reports.    See above    Assessment and Plan:  See diagnoses, orders instructions below    Continuation of care for low rectal adenocarcinoma diagnosed in August 2023 and patient received total neoadjuvant therapy, FOLFOX followed by 5-FU infusion and concurrent radiation and recently patient had CT scans, MRI pelvis, sigmoidoscopy and EGD and there was no documented malignancy.  Patient states he was given option of no surgery versus surgery and he opted not to have surgery.  His sigmoidoscopy will be repeated in 6 weeks.  He will have blood counts, chemistry and CEA once every 12 weeks and visit in 4 months.  Port flush every 6 weeks  Patient is feeling well.  Occasionally mild diarrhea.  No other GI symptoms.  No rectal pain.  No rectal bleeding.  No weight loss.  Good appetite.      A. Colon, random colon biopsy:  - Fragments of colonic mucosa with increased intraepithelial lymphocytes and focal surface epithelial damage.  - There is no increase in  subepithelial collagen thickness. See note.     Note (A): The above morphologic features may be compatible with lymphocytic (microscopic) colitis in conjunction with appropriate clinical and endoscopic findings.       B. Large Intestine, Transverse Colon, hot snare transverse colon polyp:  - Tubular adenoma, fragments.  - Negative for high grade dysplasia/ carcinoma.     C. Large Intestine, Left/Descending Colon, hot snare descending colon polyp:  - Benign colonic mucosa without significant microscopic abnormality.     D. Large Intestine, Sigmoid Colon, hot snare mid sigmoid polyp:  - Tubular adenoma.  - Negative for high grade dysplasia/ carcinoma.     E. Large Intestine, Sigmoid Colon, distal sigmoid polyps- hot snare  x1 / cold snare  2:  - Tubular adenoma x 2, negative for high grade dysplasia/ carcinoma.  - Hyperplastic polyp, negative for dysplasia/ carcinoma.      F. Rectum, biopsy rectal mass:  - Adenocarcinoma, superficial fragments. See note.  - MMR panel is pending.     Note (F): This case was reviewed at the intradepartmental  conference.   Dr. Quinones is notified of the diagnosis in [employer] via [employer] on 08/25/2023  at 1.30 pm.   Electronically signed by Griffin Stevenson MD on 8/25/2023 at  1:41 PM     .Antibody          Clone               Description                           Results  MLH1               M1                   Mismatch repair protein       Intact nuclear expression  MSH2              [id number]       Mismatch repair protein       Intact nuclear expression  MSH6              44                     Mismatch repair protein       Intact nuclear expression  PMS2              [id number]           Mismatch repair protein       Intact nuclear expression       Physical examination and test results are as recorded and discussed.  Patient has completed treatments for now for rectal cancer.  As above he is not going for surgery.  He will have follow-up sigmoidoscopy in 6 weeks.   To monitor CEA.   Goal is cure from rectal cancer if possible.  Diet and activities as tolerated. Health screening tests.  Patient is capable of self-care.  Plan is as above, surveillance.  Original plan was surgery but he wan't be going for surgery.  All discussed in very much detail.  Questions answered.  Port flush to continue .  See diagnoses, orders and instructions below.    1. Rectal adenocarcinoma (HCC)    - CBC and differential; Future  - Comprehensive metabolic panel; Future  - CEA; Future  - CBC and differential  - Comprehensive metabolic panel  - CEA  - CBC and differential; Standing  - Comprehensive metabolic panel; Standing  - CEA; Standing  - CBC and differential  - Comprehensive metabolic panel  - CEA    2. Regional lymph node metastasis present (HCC)    - CBC and differential; Future  - Comprehensive metabolic panel; Future  - CEA; Future  - CBC and differential  - Comprehensive metabolic panel  - CEA  - CBC and differential; Standing  - Comprehensive metabolic panel; Standing  - CEA; Standing  - CBC and differential  - Comprehensive metabolic panel  - CEA    3. Abnormal tumor markers    - CEA; Future  - CEA  - CEA; Standing  - CEA    4. Port-A-Cath in place      5. Dyslipidemia      6. Insomnia, unspecified type      Blood work on 6/3/2024 at the infusion center at the time of port flush and every other time after that.  Follow-up in 4 months.  Port flush every 6 weeks          Disclaimer: This document was prepared using dictation device.  If a word or phrase is confusing, or does not make sense, this is likely due to recognition error which was not discovered during the providers review. If you believe an error has occurred, please Contact me through [facility]OPE Line service for tiara?cation.    Counseling / Coordination of Care  ..  Provided counseling and support

## 2024-04-22 NOTE — PATIENT INSTRUCTIONS
Blood work on 6/3/2024 at the infusion center at the time of port flush and every other time after that.  Follow-up in 4 months.  Port flush every 6 weeks

## 2024-04-25 ENCOUNTER — DOCUMENTATION (OUTPATIENT)
Dept: HEMATOLOGY ONCOLOGY | Facility: CLINIC | Age: 59
End: 2024-04-25

## 2024-04-25 NOTE — PROGRESS NOTES
RECTAL/GI MULTIDISCIPLINARY CASE REVIEW  PRE OP RECTAL CANCER PATIENTS    DATE: 4/25/2024      PRESENTING DOCTOR: Dr. Aguilar      DIAGNOSIS: Rectal cancer, stage IIIC      Remi Olivares was presented at the Rectal/GI Multidisciplinary Conference today.    IMAGING REVIEWED:  3/14/2024 CT CAP-  No metastatic lesions in the chest, abdomen and pelvis     Asymmetric wall thickening along the lateral aspect of the herniated stomach with apparent shouldering (2/92), which can represent prominent gastric wall rugae, adherent ingested material or gastric wall neoplasm. Potential nodular soft tissue component   versus adherent ingested material in the superior aspect of the hernia as well. Endoscopic assessment is recommended for further differentiation     Mild thickening of the underdistended urinary bladder wall, likely representing postradiation changes. Clinical correlation is recommended to exclude possibility of acute cystitis.      COMPLETED TOTAL NEOADJUVANT TREATMENT DATE:  Completed on 2/26/2024    TREATMENT RESPONSE PER MRI:  4/4/2024  Previously seen low rectal cancer has markedly shrunken with only markedly T2 hypointense tissue consistent with scar remaining (series 3 images 19-26 and series 2 images 26-36.)     Previously, there was tumor involvement of the posterior half of the upper internal sphincter. This has now resolved, with 0.8 cm distance from treated tumor to the top of the anal sphincter (series 9 image 28.)     Previously, there was tumor invasion of the prostate apex. Currently, only scarlike tissue abuts the prostate apex (series 2 images 20 and that 32.)     SINCE 4/4/2024, POST TREATMENT PRIMARY TUMOR ASSESSMENT: Complete/near complete response (please note this imaging appearance does not rule out small volume residual viable tumor).     SUSPICIOUS MESORECTAL LYMPH NODES: No.     SUSPICIOUS EXTRAMESORECTAL LYMPH NODES: No.    SURGERY DATE:  N/A    EXPECTED SURGERY:  N/A    PHYSICIAN  RECOMMENDED PLAN:    -Following results to post-treatment scans the patient would like to do expectant management instead of surgery. The recommended surgery is Abdomino-perineal resection (APR).   -Patient is scheduled with Dr. Aguilar on 5/28/2024 for a flexible sigmoidoscopy and will continue with close surveillance by colorectal surgeon.     Team agreed to plan.    The final treatment plan will be left to the discretion of the patient and the treating physician.     DISCLAIMERS:  TO THE TREATING PHYSICIAN:  This conference is a meeting of clinicians from various specialty areas who evaluate and discuss patients for whom a multidisciplinary treatment approach is being considered. Please note that the above opinion was a consensus of the conference attendees and is intended only to assist in quality care of the discussed patient.  The responsibility for follow up on the input given during the conference, along with any final decisions regarding plan of care, is that of the patient and the patient's provider. Accordingly, appointments have only been recommended based on this information and have NOT been scheduled unless otherwise noted.      TO THE PATIENT:  This summary is a brief record of major aspects of your cancer treatment. You may choose to share a copy with any of your doctors or nurses. However, this is not a detailed or comprehensive record of your care.      /NCCN guidelines were readily available for review at this discussio/n

## 2024-05-24 ENCOUNTER — NURSE TRIAGE (OUTPATIENT)
Age: 59
End: 2024-05-24

## 2024-05-24 NOTE — TELEPHONE ENCOUNTER
Myah called because patient is diabetic and scheduled for a PM Flexible sigmoidoscopy.  She wanted to know if patient can eat breakfast.  If patient is not being sedated he can eat.  If patient is being sedated then she can eat a very light breakfast prior to 7am.

## 2024-05-28 ENCOUNTER — ANESTHESIA EVENT (OUTPATIENT)
Dept: GASTROENTEROLOGY | Facility: HOSPITAL | Age: 59
End: 2024-05-28

## 2024-05-28 ENCOUNTER — ANESTHESIA (OUTPATIENT)
Dept: GASTROENTEROLOGY | Facility: HOSPITAL | Age: 59
End: 2024-05-28

## 2024-05-28 ENCOUNTER — HOSPITAL ENCOUNTER (OUTPATIENT)
Dept: GASTROENTEROLOGY | Facility: HOSPITAL | Age: 59
Setting detail: OUTPATIENT SURGERY
Discharge: HOME | End: 2024-05-28
Attending: COLON & RECTAL SURGERY
Payer: COMMERCIAL

## 2024-05-28 VITALS
DIASTOLIC BLOOD PRESSURE: 67 MMHG | RESPIRATION RATE: 16 BRPM | HEIGHT: 72 IN | WEIGHT: 212 LBS | BODY MASS INDEX: 28.71 KG/M2 | TEMPERATURE: 98.6 F | OXYGEN SATURATION: 94 % | SYSTOLIC BLOOD PRESSURE: 114 MMHG | HEART RATE: 76 BPM

## 2024-05-28 DIAGNOSIS — C20 RECTAL ADENOCARCINOMA (HCC): ICD-10-CM

## 2024-05-28 PROBLEM — I10 HTN (HYPERTENSION): Status: ACTIVE | Noted: 2024-05-28

## 2024-05-28 LAB — GLUCOSE SERPL-MCNC: 169 MG/DL (ref 65–140)

## 2024-05-28 PROCEDURE — 45330 DIAGNOSTIC SIGMOIDOSCOPY: CPT | Performed by: COLON & RECTAL SURGERY

## 2024-05-28 PROCEDURE — 82948 REAGENT STRIP/BLOOD GLUCOSE: CPT

## 2024-05-28 RX ORDER — PROPOFOL 10 MG/ML
INJECTION, EMULSION INTRAVENOUS AS NEEDED
Status: DISCONTINUED | OUTPATIENT
Start: 2024-05-28 | End: 2024-05-28

## 2024-05-28 RX ORDER — SODIUM CHLORIDE 9 MG/ML
INJECTION, SOLUTION INTRAVENOUS CONTINUOUS PRN
Status: DISCONTINUED | OUTPATIENT
Start: 2024-05-28 | End: 2024-05-28

## 2024-05-28 RX ORDER — PROPOFOL 10 MG/ML
INJECTION, EMULSION INTRAVENOUS CONTINUOUS PRN
Status: DISCONTINUED | OUTPATIENT
Start: 2024-05-28 | End: 2024-05-28

## 2024-05-28 RX ORDER — LIDOCAINE HYDROCHLORIDE 20 MG/ML
INJECTION, SOLUTION EPIDURAL; INFILTRATION; INTRACAUDAL; PERINEURAL AS NEEDED
Status: DISCONTINUED | OUTPATIENT
Start: 2024-05-28 | End: 2024-05-28

## 2024-05-28 RX ADMIN — PROPOFOL 100 MCG/KG/MIN: 10 INJECTION, EMULSION INTRAVENOUS at 15:42

## 2024-05-28 RX ADMIN — PROPOFOL 100 MG: 10 INJECTION, EMULSION INTRAVENOUS at 15:40

## 2024-05-28 RX ADMIN — LIDOCAINE HYDROCHLORIDE 50 MG: 20 INJECTION, SOLUTION EPIDURAL; INFILTRATION; INTRACAUDAL at 15:40

## 2024-05-28 RX ADMIN — SODIUM CHLORIDE: 0.9 INJECTION, SOLUTION INTRAVENOUS at 15:22

## 2024-05-28 NOTE — H&P
History and Physical   Colon and Rectal Surgery   Remi Olivares 59 y.o. male MRN: [id number]  Unit/Bed#:  Encounter: [id number]  05/28/24   3:28 PM              ASSESSMENT:  Remi Olivares is a 59 y.o. male who presents for sigmoidoscopy.  Completion of chemoradiation therapy 2/26/24 neoadjuvant, for low/distal rectal cancer extending to the dentate line, has requested expectant, nonoperative management.  Sigmoidoscopy colonoscopy,discussed in a face-to-face, personal, informed consent process, the benefits, alternatives, risks including not limited to bleeding, missed lesion, perforation requiring emergent surgery discussed/understood, signed consent.    PLAN:  Sigmoidoscopy    No chief complaint on file.        History of Present Illness   HPI:  Remi Olivares is a 59 y.o. male who presents for screening colonoscopy.  Denies nausea/vomiting/abdominal pain, change in bowel habits, rectal bleeding, or other constitutional symptoms.       Historical Information   Past Medical History:   Diagnosis Date    Cancer (HCC)     rectal gone with treatment    Diabetes mellitus (HCC)     Hyperlipidemia      Past Surgical History:   Procedure Laterality Date    COLONOSCOPY      GALLBLADDER SURGERY  2013    IR PORT PLACEMENT  09/18/2023    TONSILLECTOMY      UMBILICAL HERNIA REPAIR  2013       Meds/Allergies     Not in a hospital admission.      Current Outpatient Medications:     buPROPion (WELLBUTRIN XL) 300 mg 24 hr tablet, Take 300 mg by mouth daily, Disp: , Rfl:     escitalopram (LEXAPRO) 20 mg tablet, Take 20 mg by mouth daily, Disp: , Rfl:     lisinopril (ZESTRIL) 5 mg tablet, Take 5 mg by mouth daily, Disp: , Rfl:     metFORMIN (GLUCOPHAGE) 1000 MG tablet, Take 1,000 mg by mouth 2 (two) times a day, Disp: , Rfl:     omeprazole (PriLOSEC) 40 MG capsule, Take 1 capsule (40 mg total) by mouth daily, Disp: 30 capsule, Rfl: 2    rosuvastatin (CRESTOR) 20 MG tablet, Take 20 mg by mouth daily, Disp: , Rfl:     ibuprofen (MOTRIN) 200  mg tablet, Take 600 mg by mouth every 6 (six) hours, Disp: , Rfl:     lidocaine-prilocaine (EMLA) cream, Apply topically as needed for mild pain, Disp: 1 g, Rfl: 1    ondansetron (ZOFRAN) 8 mg tablet, Take one tablet by mouth every 8 hours an needed for nausea, Disp: 20 tablet, Rfl: 0    prochlorperazine (COMPAZINE) 10 mg tablet, Take 1 tablet (10 mg total) by mouth every 6 (six) hours as needed for nausea or vomiting, Disp: 30 tablet, Rfl: 0    traMADol (Ultram) 50 mg tablet, Take 1 tablet (50 mg total) by mouth every 4 (four) hours as needed for moderate pain, Disp: 120 tablet, Rfl: 0    Allergies   Allergen Reactions    Atorvastatin Myalgia         Social History   Social History     Substance and Sexual Activity   Alcohol Use Never     Social History     Substance and Sexual Activity   Drug Use Never     Social History     Tobacco Use   Smoking Status Former    Current packs/day: 0.00    Average packs/day: 2.0 packs/day for 30.0 years (60.0 ttl pk-yrs)    Types: Cigarettes    Start date:     Quit date:     Years since quittin.4    Passive exposure: Past   Smokeless Tobacco Never         Family History:   Family History   Problem Relation Age of Onset    No Known Problems Mother     No Known Problems Father          Objective     Current Vitals:   Blood Pressure: 128/75 (24 1450)  Pulse: 83 (24 1450)  Temperature: 98.4 °F (36.9 °C) (24 1450)  Temp Source: Tympanic (24 1450)  Respirations: 16 (24 1450)  Height: 6' (182.9 cm) (24 1450)  Weight - Scale: 96.2 kg (212 lb) (24 1450)  SpO2: 96 % (24 1450)  No intake or output data in the 24 hours ending 24 1528    Physical Exam:  General:no distress  Eyes:perrla/eomi  ENT:moist mucus membranes  Neck:supple  Pulm:no increased work of breathing  CV:sinus  Abdomen:soft,nontender  Extremities:no edema

## 2024-05-28 NOTE — ANESTHESIA POSTPROCEDURE EVALUATION
Post-Op Assessment Note    CV Status:  Stable  Pain Score: 0         Mental Status:  Alert and awake   Hydration Status:  Stable   PONV Controlled:  None   Airway Patency:  Patent     Post Op Vitals Reviewed: Yes    No anethesia notable event occurred.    Staff: JANINE               /78 (05/28/24 1557)    Temp 98.6 °F (37 °C) (05/28/24 1556)    Pulse 81 (05/28/24 1557)   Resp 18 (05/28/24 1557)    SpO2 96 % (05/28/24 1557)

## 2024-05-28 NOTE — ANESTHESIA PREPROCEDURE EVALUATION
Procedure:  FLEXIBLE SIGMOIDOSCOPY    Relevant Problems   CARDIO   (+) HTN (hypertension)      GI/HEPATIC   (+) Rectal adenocarcinoma (HCC)      Endocrine   (+) Diabetes mellitus (HCC)      Oncology   (+) Cancer related pain   (+) Chemotherapy induced neutropenia (HCC)   (+) Chemotherapy-induced nausea      Other   (+) Dyslipidemia   (+) Port-A-Cath in place        Physical Exam    Airway    Mallampati score: II  TM Distance: >3 FB  Neck ROM: limited     Dental        Cardiovascular      Pulmonary      Other Findings      Anesthesia Plan  ASA Score- 2     Anesthesia Type- IV sedation with anesthesia with ASA Monitors.         Additional Monitors:     Airway Plan:            Plan Factors-    Chart reviewed.   Existing labs reviewed. Patient summary reviewed.    Patient is not a current smoker.  Patient did not smoke on day of surgery.            Induction- intravenous.    Postoperative Plan-     Perioperative Resuscitation Plan - Level 1 - Full Code.       Informed Consent- Anesthetic plan and risks discussed with patient.  I personally reviewed this patient with the CRNA. Discussed and agreed on the Anesthesia Plan with the CRNA..    VITALS  There were no vitals taken for this visit.  BP Readings from Last 3 Encounters:   04/22/24 128/74   04/15/24 113/72   04/10/24 132/78     LABS  Results from Last 12 Months   Lab Units 04/17/24  0653 02/23/24  1455   WBC Thousand/uL  --  5.45   WHITE BLOOD CELL COUNT. x10E3/uL 4.6  --    HEMOGLOBIN g/dL  --  12.5   HEMOGLOBIN. g/dL 14.0  --    HEMATOCRIT %  --  37.3   HEMATOCRIT. % 42.5  --    PLATELETS Thousands/uL  --  208   PLATELETS. x10E3/uL 241  --      Results from Last 12 Months   Lab Units 02/23/24  1455 02/16/24  1506 09/05/23  0657 06/16/23  0000   SODIUM mmol/L 136 135   < >  --    POTASSIUM mmol/L 3.7 3.7   < >  --    CHLORIDE mmol/L 102 102   < >  --    CO2 mmol/L 27 24   < >  --    ANION GAP mmol/L 7 9   < >  --    BUN mg/dL 15 17   < >  --    CREATININE mg/dL 0.71  0.79   < >  --    CALCIUM mg/dL 9.4 9.8   < >  --    GLUCOSE RANDOM mg/dL 117 153*   < >  --    HEMOGLOBIN A1C   --   --   --  7.3    < > = values in this interval not displayed.     Results from Last 12 Months   Lab Units 09/18/23  0952   INR  0.88       ECG  No results found for this or any previous visit (from the past 4464 hour(s)).  No results found for this or any previous visit.    ECHOCARDIOGRAPHY AND OTHER TESTING/IMAGING  N/a    ANESTHESIA RISK-BENEFIT DISCUSSION  BENEFITS OF A SPECIALIZED ANESTHESIA TEAM INCLUDE (NBK 230563, PMID 01802033):  (1) Reduce mortality and morbidity for major surgeries/procedures. (2) The team provides analgesia/sedation/amnesia/akinesia as safely as possible. (3) The team strives to reduce discomfort as safely as possible.    RISKS, AND PLANS TO MITIGATE RISKS, INCLUDE:    - Neurologic system: IntraOp awareness (Risk is ~1:1,000 - 1:14,000; PMID 24006777), Stroke (Risk ~<0.1-2% for most cases; PMID 13618962), nerve injury, vision loss, and POCD.     - Airway and Pulmonary system: Dental or mouth injury, throat pain, critical hypoxia, pneumothorax, prolonged intubation, post-op respiratory compromise.  Airway/Intubation risks and prior data: No prior advanced airway notes in [facility] EMR  Major ARISCAT risk factors for pulmonary complications include: none, yielding a score of 0-1= Low risk, 1.6%.  - Cardiovascular system: Hypotension, arrhythmias, cardiac injury or arrest, blood clots, bleeding, infection, vascular injuries.  Reed's RCRI score items: none, yielding an RCRI Score of 0= 0.4% risk of MACE  Are radha-op or intra-op beta blockers indicated? (PMID 02430231): no  - FEN/GI system: Aspiration risk (~0.5% [facility] 1979173) and PONV (10-80% per Apfel score) especially if the patient has not fasted.  ASA NPO guideline compliance?: Indeterminate; POC gastric ultrasound shows Essentially empty antrum with sparse air bubbles  - Medication risk assessment: Allergic reactions, excessive  bleeding with anticoagulant use, overdoses, drug-drug interactions, injury to a fetus or  in pregnant or breastfeeding patients, radha-procedural sedation including while driving/operating machinery.  Recent relevant medications: See MAR or Med Review  Personal or family history of anesthesia complications: no  Pregnancy Status: N/A  - Estimate mortality risks associated with anesthesia based on ASA-PS (PMID 96107411): ASA-PS II: risk 1:20,000

## 2024-05-29 ENCOUNTER — TELEPHONE (OUTPATIENT)
Age: 59
End: 2024-05-29

## 2024-05-29 ENCOUNTER — PREP FOR PROCEDURE (OUTPATIENT)
Age: 59
End: 2024-05-29

## 2024-05-29 DIAGNOSIS — C20 RECTAL ADENOCARCINOMA (HCC): Primary | ICD-10-CM

## 2024-05-29 NOTE — TELEPHONE ENCOUNTER
Scheduled date of colonoscopy (as of today):  8/30/24    Physician performing colonoscopy: Jeff      Location of colonoscopy: [facility]    Bowel prep reviewed with patient:  PROSPER/JED    Instructions reviewed with patient by:magaly    Clearances: na    DIABETIC

## 2024-06-03 ENCOUNTER — HOSPITAL ENCOUNTER (OUTPATIENT)
Dept: INFUSION CENTER | Facility: HOSPITAL | Age: 59
Discharge: HOME | End: 2024-06-03
Payer: COMMERCIAL

## 2024-06-03 DIAGNOSIS — C77.9 REGIONAL LYMPH NODE METASTASIS PRESENT (HCC): ICD-10-CM

## 2024-06-03 DIAGNOSIS — Z95.828 PORT-A-CATH IN PLACE: Primary | ICD-10-CM

## 2024-06-03 DIAGNOSIS — C20 RECTAL ADENOCARCINOMA (HCC): ICD-10-CM

## 2024-06-03 DIAGNOSIS — R97.8 ABNORMAL TUMOR MARKERS: ICD-10-CM

## 2024-06-03 LAB
ALBUMIN SERPL BCP-MCNC: 4.3 G/DL (ref 3.5–5)
ALP SERPL-CCNC: 69 U/L (ref 34–104)
ALT SERPL W P-5'-P-CCNC: 26 U/L (ref 7–52)
ANION GAP SERPL CALCULATED.3IONS-SCNC: 7 MMOL/L (ref 4–13)
AST SERPL W P-5'-P-CCNC: 15 U/L (ref 13–39)
BASOPHILS # BLD AUTO: 0.03 THOUSANDS/ÂΜL (ref 0–0.1)
BASOPHILS NFR BLD AUTO: 1 % (ref 0–1)
BILIRUB SERPL-MCNC: 0.87 MG/DL (ref 0.2–1)
BUN SERPL-MCNC: 16 MG/DL (ref 5–25)
CALCIUM SERPL-MCNC: 9.1 MG/DL (ref 8.4–10.2)
CEA SERPL-MCNC: 1.4 NG/ML (ref 0–3)
CHLORIDE SERPL-SCNC: 106 MMOL/L (ref 96–108)
CO2 SERPL-SCNC: 26 MMOL/L (ref 21–32)
CREAT SERPL-MCNC: 0.77 MG/DL (ref 0.6–1.3)
EOSINOPHIL # BLD AUTO: 0.09 THOUSAND/ÂΜL (ref 0–0.61)
EOSINOPHIL NFR BLD AUTO: 2 % (ref 0–6)
ERYTHROCYTE [DISTWIDTH] IN BLOOD BY AUTOMATED COUNT: 12.8 % (ref 11.6–15.1)
GFR SERPL CREATININE-BSD FRML MDRD: 100 ML/MIN/1.73SQ M
GLUCOSE SERPL-MCNC: 176 MG/DL (ref 65–140)
HCT VFR BLD AUTO: 43.6 % (ref 36.5–49.3)
HGB BLD-MCNC: 14.2 G/DL (ref 12–17)
IMM GRANULOCYTES # BLD AUTO: 0.05 THOUSAND/UL (ref 0–0.2)
IMM GRANULOCYTES NFR BLD AUTO: 1 % (ref 0–2)
LYMPHOCYTES # BLD AUTO: 0.61 THOUSANDS/ÂΜL (ref 0.6–4.47)
LYMPHOCYTES NFR BLD AUTO: 12 % (ref 14–44)
MCH RBC QN AUTO: 32.3 PG (ref 26.8–34.3)
MCHC RBC AUTO-ENTMCNC: 32.6 G/DL (ref 31.4–37.4)
MCV RBC AUTO: 99 FL (ref 82–98)
MONOCYTES # BLD AUTO: 0.44 THOUSAND/ÂΜL (ref 0.17–1.22)
MONOCYTES NFR BLD AUTO: 8 % (ref 4–12)
NEUTROPHILS # BLD AUTO: 4.04 THOUSANDS/ÂΜL (ref 1.85–7.62)
NEUTS SEG NFR BLD AUTO: 76 % (ref 43–75)
NRBC BLD AUTO-RTO: 0 /100 WBCS
PLATELET # BLD AUTO: 204 THOUSANDS/UL (ref 149–390)
PMV BLD AUTO: 8.5 FL (ref 8.9–12.7)
POTASSIUM SERPL-SCNC: 4.4 MMOL/L (ref 3.5–5.3)
PROT SERPL-MCNC: 6.7 G/DL (ref 6.4–8.4)
RBC # BLD AUTO: 4.4 MILLION/UL (ref 3.88–5.62)
SODIUM SERPL-SCNC: 139 MMOL/L (ref 135–147)
WBC # BLD AUTO: 5.26 THOUSAND/UL (ref 4.31–10.16)

## 2024-06-03 PROCEDURE — 85025 COMPLETE CBC W/AUTO DIFF WBC: CPT

## 2024-06-03 PROCEDURE — 82378 CARCINOEMBRYONIC ANTIGEN: CPT

## 2024-06-03 PROCEDURE — 80053 COMPREHEN METABOLIC PANEL: CPT

## 2024-06-03 NOTE — PROGRESS NOTES
Pt's port accessed using sterile technique. Labs drawn without difficulty. Port de-accessed. Band-aid applied. Pt ambulated with a steady gait off unit. AVS printed and given to pt.     Aware of next appt 08/23/24 0800 am

## 2024-07-17 ENCOUNTER — OFFICE VISIT (OUTPATIENT)
Facility: HOSPITAL | Age: 59
End: 2024-07-17
Attending: RADIOLOGY
Payer: COMMERCIAL

## 2024-07-17 VITALS
WEIGHT: 213 LBS | TEMPERATURE: 97 F | HEART RATE: 96 BPM | HEIGHT: 72 IN | DIASTOLIC BLOOD PRESSURE: 86 MMHG | BODY MASS INDEX: 28.85 KG/M2 | SYSTOLIC BLOOD PRESSURE: 130 MMHG

## 2024-07-17 DIAGNOSIS — C20 RECTAL ADENOCARCINOMA (HCC): Primary | ICD-10-CM

## 2024-07-17 PROCEDURE — 99213 OFFICE O/P EST LOW 20 MIN: CPT | Performed by: RADIOLOGY

## 2024-07-17 NOTE — PROGRESS NOTES
Remi Olivares 1965 is a 59 y.o. male With stage IIIC [id number] distal rectal adenocarcinoma and prostate involvement. He presented for consideration of radiotherapy as a component of total neoadjuvant therapy.  He completed initial FOLFOX and was referred for concurrent long course chemoradiation as a component of total neoadjuvant treatment. He completed a course of radiation therapy to the whole pelvis on 2/26/2024. He was last contacted 3/29/24. He returns today for routine follow up.         3/29/24 Dr. Aguilar  Follow up MRI on 4/4  Schedule for sigmoidoscopy  Following imaging pt will provide decision regarding resection  To be discussed at Tumor Board      4/4/24 MRI pelvis rectal cancer staging  IMPRESSION:  Unenhanced MRI of the Pelvis (Rectal Protocol)  Previously seen low rectal cancer has markedly shrunken with only markedly T2 hypointense tissue consistent with scar remaining (series 3 images 19-26 and series 2 images 26-36.)   Previously, there was tumor involvement of the posterior half of the upper internal sphincter. This has now resolved, with 0.8 cm distance from treated tumor to the top of the anal sphincter (series 9 image 28.)  Previously, there was tumor invasion of the prostate apex. Currently, only scarlike tissue abuts the prostate apex (series 2 images 20 and that 32.)     SINCE 4/4/2024, POST TREATMENT PRIMARY TUMOR ASSESSMENT: Complete/near complete response (please note this imaging appearance does not rule out small volume residual viable tumor).  SUSPICIOUS MESORECTAL LYMPH NODES: No.     SUSPICIOUS EXTRAMESORECTAL LYMPH NODES: No.      4/10/24 Flexible Sigmoidoscopy with Dr. Aguilar  FINDINGS:  Scarred mucosa in the distal rectum. Postradiation, minimal scalloped edges remaining to complete flattened scar/excellent mucosal response, approximately 6 weeks posttreatment.  This may represent complete mucosal response.      IMPRESSION:  Scarred mucosa in the distal rectum      RECOMMENDATION:  -MRI pending  -6 to 8 weeks repeat sigmoidoscopy exam    Schedule repeat sigmoidoscopy         4/15/24 EGD  FINDINGS:  The duodenum appeared normal.  The stomach appeared normal.  2 cm herniation of gastric fundus into thoracic cavity (type II hiatal hernia) - GE junction 38 cm from the incisors, diaphragmatic impression 40 cm from the incisors:  Hill classification: Grade III  Benign-appearing intrinsic stricture (traversable) in the GE junction; performed cold forceps biopsy; dilated with balloon dilator from 20 mm starting size. Dilation caused disruption of ring    IMPRESSION:  Paraesophageal hernia  Benign appearing stricture at gastroesophageal junction, biopsied and balloon dilated to 20 mm  Normal stomach and duodenum      4/25/24 Rectal/GI [facility]  PHYSICIAN RECOMMENDED PLAN:  -Following results to post-treatment scans the patient would like to do expectant management instead of surgery. The recommended surgery is Abdomino-perineal resection (APR).   -Patient is scheduled with Dr. Aguilar on 5/28/2024 for a flexible sigmoidoscopy and will continue with close surveillance by colorectal surgeon.   Team agreed to plan.  The final treatment plan will be left to the discretion of the patient and the treating physician.         5/28/24 Flexible Sigmoidoscopy with Dr. Aguilar  FINDINGS:  Postradiation, minimal scalloped edges remaining to complete flattened scar/excellent mucosal response in the anterior midline, extending to dentate.     RECOMMENDATION:  Schedule full colonoscopy   3months followup colonoscopy         Upcoming appointments:  8/26/24 Dr. Carrillo  9/20/24 Dr. Aguilar      Follow up visit     Oncology History   Rectal adenocarcinoma (HCC)   8/22/2023 Initial Diagnosis    Rectal adenocarcinoma (HCC)     8/22/2023 Biopsy    Final Diagnosis  F. Rectum, biopsy rectal mass:  - Adenocarcinoma, superficial fragments. See note.  - MMR panel is pending.     9/14/2023 -  Cancer Staged     Staging form: Colon and Rectum, AJCC 8th Edition  - Clinical stage from 9/14/2023: Stage IIIC (cT4b, cN1, cM0) - Signed by Aristides Carrillo MD on 9/14/2023  Stage prefix: Initial diagnosis  Histologic grade (G): GX  Histologic grading system: 4 grade system       9/25/2023 - 1/8/2024 Chemotherapy    alteplase (CATHFLO), 2 mg, Intracatheter, Every 1 Minute as needed, 8 of 8 cycles  fluorouracil (ADRUCIL), 400 mg/m2 = 875 mg, Intravenous, Once, 8 of 8 cycles  Administration: 875 mg (9/25/2023), 875 mg (10/9/2023), 875 mg (10/23/2023), 875 mg (11/6/2023), 875 mg (11/20/2023), 875 mg (12/4/2023), 875 mg (12/18/2023), 875 mg (1/2/2024)  leucovorin calcium IVPB, 876 mg, Intravenous, Once, 8 of 8 cycles  Administration: 900 mg (9/25/2023), 900 mg (10/9/2023), 900 mg (10/23/2023), 900 mg (11/6/2023), 900 mg (11/20/2023), 900 mg (12/4/2023), 900 mg (12/18/2023), 900 mg (1/2/2024)  oxaliplatin (ELOXATIN) chemo infusion, 85 mg/m2 = 186.15 mg, Intravenous, Once, 8 of 8 cycles  Administration: 186.15 mg (9/25/2023), 186.15 mg (10/9/2023), 186.15 mg (10/23/2023), 186.15 mg (11/6/2023), 186.15 mg (11/20/2023), 186.15 mg (12/4/2023), 186.15 mg (12/18/2023), 186.15 mg (1/2/2024)  fluorouracil (ADRUCIL) ambulatory infusion Soln, 1,200 mg/m2/day = 5,255 mg, Intravenous, Over 46 hours, 8 of 8 cycles     1/15/2024 -  Chemotherapy    alteplase (CATHFLO), 2 mg, Intracatheter, Every 1 Minute as needed, 5 of 5 cycles  fluorouracil (ADRUCIL) ambulatory infusion Soln, 225 mg/m2/day = 2,440 mg, Intravenous, Over 120 hours, 5 of 5 cycles      Radiation    Plan ID Energy Fractions Dose per Fraction (cGy) Dose Correction (cGy) Total Dose Delivered (cGy) Elapsed Days   CD Pelvis RA 6X 3 / 3 180 0 540 4   Wh Pelvis RA 6X 25 / 25 180 0 4,500 35      Treatment Dates:  1/16/2024 - 2/26/2024.          Review of Systems:  Review of Systems   Constitutional:  Positive for fatigue. Negative for unexpected weight change.   HENT:  Negative for mouth  sores and sore throat.    Eyes: Negative.    Respiratory: Negative.     Cardiovascular: Negative.    Gastrointestinal:  Negative for anal bleeding, blood in stool, constipation, diarrhea, nausea, rectal pain and vomiting.   Endocrine: Positive for heat intolerance.   Musculoskeletal: Negative.    Allergic/Immunologic: Positive for environmental allergies.   Neurological:  Negative for weakness and numbness.   Psychiatric/Behavioral:  The patient is not nervous/anxious.        Clinical Trial: no    Teaching yes    Health Maintenance   Topic Date Due    Hepatitis C Screening  Never done    Kidney Health Evaluation: Albumin/Creatinine Ratio  Never done    Pneumococcal Vaccine: Pediatrics (0 to 5 Years) and At-Risk Patients (6 to 64 Years) (1 of 2 - PCV) Never done    Diabetic Foot Exam  Never done    DM Eye Exam  Never done    HIV Screening  Never done    BMI: Followup Plan  Never done    Annual Physical  Never done    Zoster Vaccine (1 of 2) Never done    DTaP,Tdap,and Td Vaccines (1 - Tdap) 01/02/2009    COVID-19 Vaccine (2 - [place] risk series) 05/03/2021    HEMOGLOBIN A1C  12/16/2023    Influenza Vaccine (1) 09/01/2024    Lung Cancer Screening  03/14/2025    BMI: Adult  05/28/2025    Kidney Health Evaluation: GFR  06/03/2025    Depression Screening  07/17/2025    RSV Vaccine Age 60+ Years (1 - 1-dose 60+ series) 03/25/2027    RSV Vaccine age 0-20 Months  Aged Out    HIB Vaccine  Aged Out    IPV Vaccine  Aged Out    Hepatitis A Vaccine  Aged Out    Meningococcal ACWY Vaccine  Aged Out    HPV Vaccine  Aged Out    Colorectal Cancer Screening  Discontinued     Patient Active Problem List   Diagnosis    Diabetes mellitus (HCC)    Dyslipidemia    Rectal adenocarcinoma (HCC)    Regional lymph node metastasis present (HCC)    Abnormal tumor markers    History of diabetes mellitus    Chemotherapy-induced nausea    Chemotherapy induced neutropenia (HCC)    Cancer related pain    Port-A-Cath in place    Insomnia    HTN  (hypertension)     Past Medical History:   Diagnosis Date    Cancer (HCC)     rectal gone with treatment    Diabetes mellitus (HCC)     Hyperlipidemia      Past Surgical History:   Procedure Laterality Date    COLONOSCOPY      GALLBLADDER SURGERY      IR PORT PLACEMENT  2023    TONSILLECTOMY      UMBILICAL HERNIA REPAIR  2013     Family History   Problem Relation Age of Onset    No Known Problems Mother     No Known Problems Father      Social History     Socioeconomic History    Marital status: /Civil Union     Spouse name: Not on file    Number of children: Not on file    Years of education: Not on file    Highest education level: Not on file   Occupational History    Not on file   Tobacco Use    Smoking status: Former     Current packs/day: 0.00     Average packs/day: 2.0 packs/day for 30.0 years (60.0 ttl pk-yrs)     Types: Cigarettes     Start date:      Quit date:      Years since quittin.5     Passive exposure: Past    Smokeless tobacco: Never   Vaping Use    Vaping status: Never Used   Substance and Sexual Activity    Alcohol use: Never    Drug use: Never    Sexual activity: Not on file   Other Topics Concern    Not on file   Social History Narrative    Not on file     Social Determinants of Health     Financial Resource Strain: Not on file   Food Insecurity: Not on file   Transportation Needs: Not on file   Physical Activity: Not on file   Stress: Not on file   Social Connections: Not on file   Intimate Partner Violence: Not on file   Housing Stability: Not on file       Current Outpatient Medications:     buPROPion (WELLBUTRIN XL) 300 mg 24 hr tablet, Take 300 mg by mouth daily, Disp: , Rfl:     escitalopram (LEXAPRO) 20 mg tablet, Take 20 mg by mouth daily, Disp: , Rfl:     ibuprofen (MOTRIN) 200 mg tablet, Take 600 mg by mouth every 6 (six) hours as needed for headaches, Disp: , Rfl:     lisinopril (ZESTRIL) 5 mg tablet, Take 5 mg by mouth daily, Disp: , Rfl:     metFORMIN  (GLUCOPHAGE) 1000 MG tablet, Take 1,000 mg by mouth 2 (two) times a day, Disp: , Rfl:     rosuvastatin (CRESTOR) 20 MG tablet, Take 20 mg by mouth daily, Disp: , Rfl:     lidocaine-prilocaine (EMLA) cream, Apply topically as needed for mild pain (Patient not taking: Reported on 7/17/2024), Disp: 1 g, Rfl: 1    omeprazole (PriLOSEC) 40 MG capsule, Take 1 capsule (40 mg total) by mouth daily (Patient not taking: Reported on 7/17/2024), Disp: 30 capsule, Rfl: 2    ondansetron (ZOFRAN) 8 mg tablet, Take one tablet by mouth every 8 hours an needed for nausea (Patient not taking: Reported on 7/17/2024), Disp: 20 tablet, Rfl: 0    prochlorperazine (COMPAZINE) 10 mg tablet, Take 1 tablet (10 mg total) by mouth every 6 (six) hours as needed for nausea or vomiting (Patient not taking: Reported on 7/17/2024), Disp: 30 tablet, Rfl: 0    traMADol (Ultram) 50 mg tablet, Take 1 tablet (50 mg total) by mouth every 4 (four) hours as needed for moderate pain (Patient not taking: Reported on 7/17/2024), Disp: 120 tablet, Rfl: 0  Allergies   Allergen Reactions    Atorvastatin Myalgia     Vitals:    07/17/24 0755   BP: 130/86   BP Location: Left arm   Patient Position: Sitting   Cuff Size: Large   Pulse: 96   Temp: (!) 97 °F (36.1 °C)   TempSrc: Temporal   Weight: 96.6 kg (213 lb)   Height: 6' (1.829 m)      Pain Score: 0-No pain

## 2024-07-17 NOTE — PROGRESS NOTES
Follow-up - Radiation Oncology   Remi Olivares 1965 59 y.o. male [id number]      History of Present Illness   Cancer Staging   Rectal adenocarcinoma (HCC)  Staging form: Colon and Rectum, AJCC 8th Edition  - Clinical stage from 9/14/2023: Stage IIIC (cT4b, cN1, cM0) - Signed by Aristides Carrillo MD on 9/14/2023  Stage prefix: Initial diagnosis  Histologic grade (G): GX  Histologic grading system: 4 grade system      Remi Olivares is a 59 y.o. male with stage IIIC [id number] distal rectal adenocarcinoma and prostate involvement who presented for consideration of radiotherapy as a component of total neoadjuvant therapy.  He completed initial FOLFOX and long course chemoradiation (1/16/24 - 2/26/24).  He presents for continued follow up.    Interval History:  3/29/24 Dr. Aguilar  Follow up MRI on 4/4  Schedule for sigmoidoscopy  Following imaging pt will provide decision regarding resection  To be discussed at Tumor Board        4/4/24 MRI pelvis rectal cancer staging  IMPRESSION:  Unenhanced MRI of the Pelvis (Rectal Protocol)  Previously seen low rectal cancer has markedly shrunken with only markedly T2 hypointense tissue consistent with scar remaining (series 3 images 19-26 and series 2 images 26-36.)   Previously, there was tumor involvement of the posterior half of the upper internal sphincter. This has now resolved, with 0.8 cm distance from treated tumor to the top of the anal sphincter (series 9 image 28.)  Previously, there was tumor invasion of the prostate apex. Currently, only scarlike tissue abuts the prostate apex (series 2 images 20 and that 32.)     SINCE 4/4/2024, POST TREATMENT PRIMARY TUMOR ASSESSMENT: Complete/near complete response (please note this imaging appearance does not rule out small volume residual viable tumor).  SUSPICIOUS MESORECTAL LYMPH NODES: No.     SUSPICIOUS EXTRAMESORECTAL LYMPH NODES: No.        4/10/24 Flexible Sigmoidoscopy with Dr. Aguilar  FINDINGS:  Scarred mucosa in  the distal rectum. Postradiation, minimal scalloped edges remaining to complete flattened scar/excellent mucosal response, approximately 6 weeks posttreatment.  This may represent complete mucosal response.        IMPRESSION:  Scarred mucosa in the distal rectum     RECOMMENDATION:  -MRI pending  -6 to 8 weeks repeat sigmoidoscopy exam    Schedule repeat sigmoidoscopy            4/15/24 EGD  FINDINGS:  The duodenum appeared normal.  The stomach appeared normal.  2 cm herniation of gastric fundus into thoracic cavity (type II hiatal hernia) - GE junction 38 cm from the incisors, diaphragmatic impression 40 cm from the incisors:  Hill classification: Grade III  Benign-appearing intrinsic stricture (traversable) in the GE junction; performed cold forceps biopsy; dilated with balloon dilator from 20 mm starting size. Dilation caused disruption of ring     IMPRESSION:  Paraesophageal hernia  Benign appearing stricture at gastroesophageal junction, biopsied and balloon dilated to 20 mm  Normal stomach and duodenum        4/25/24 Rectal/GI [facility]  PHYSICIAN RECOMMENDED PLAN:  -Following results to post-treatment scans the patient would like to do expectant management instead of surgery. The recommended surgery is Abdomino-perineal resection (APR).   -Patient is scheduled with Dr. Aguilar on 5/28/2024 for a flexible sigmoidoscopy and will continue with close surveillance by colorectal surgeon.   Team agreed to plan.  The final treatment plan will be left to the discretion of the patient and the treating physician.           5/28/24 Flexible Sigmoidoscopy with Dr. Aguilar  FINDINGS:  Postradiation, minimal scalloped edges remaining to complete flattened scar/excellent mucosal response in the anterior midline, extending to dentate.     RECOMMENDATION:  Schedule full colonoscopy   3months followup colonoscopy            Upcoming appointments:  8/26/24 Dr. Carrillo  9/20/24 Dr. Aguilar    Upon interview, he endorses the  above history.  He has declined TME.  He reports regular bowel movements without hematochezia.  He denies dysuria or irritative voiding.  Weight is stable.  He denies pain.  He is without additional acute concerns.    Historical Information   Oncology History   Rectal adenocarcinoma (HCC)   8/22/2023 Initial Diagnosis    Rectal adenocarcinoma (HCC)     8/22/2023 Biopsy    Final Diagnosis  F. Rectum, biopsy rectal mass:  - Adenocarcinoma, superficial fragments. See note.  - MMR panel is pending.     9/14/2023 -  Cancer Staged    Staging form: Colon and Rectum, AJCC 8th Edition  - Clinical stage from 9/14/2023: Stage IIIC (cT4b, cN1, cM0) - Signed by Aristides Carrillo MD on 9/14/2023  Stage prefix: Initial diagnosis  Histologic grade (G): GX  Histologic grading system: 4 grade system       9/25/2023 - 1/8/2024 Chemotherapy    alteplase (CATHFLO), 2 mg, Intracatheter, Every 1 Minute as needed, 8 of 8 cycles  fluorouracil (ADRUCIL), 400 mg/m2 = 875 mg, Intravenous, Once, 8 of 8 cycles  Administration: 875 mg (9/25/2023), 875 mg (10/9/2023), 875 mg (10/23/2023), 875 mg (11/6/2023), 875 mg (11/20/2023), 875 mg (12/4/2023), 875 mg (12/18/2023), 875 mg (1/2/2024)  leucovorin calcium IVPB, 876 mg, Intravenous, Once, 8 of 8 cycles  Administration: 900 mg (9/25/2023), 900 mg (10/9/2023), 900 mg (10/23/2023), 900 mg (11/6/2023), 900 mg (11/20/2023), 900 mg (12/4/2023), 900 mg (12/18/2023), 900 mg (1/2/2024)  oxaliplatin (ELOXATIN) chemo infusion, 85 mg/m2 = 186.15 mg, Intravenous, Once, 8 of 8 cycles  Administration: 186.15 mg (9/25/2023), 186.15 mg (10/9/2023), 186.15 mg (10/23/2023), 186.15 mg (11/6/2023), 186.15 mg (11/20/2023), 186.15 mg (12/4/2023), 186.15 mg (12/18/2023), 186.15 mg (1/2/2024)  fluorouracil (ADRUCIL) ambulatory infusion Soln, 1,200 mg/m2/day = 5,255 mg, Intravenous, Over 46 hours, 8 of 8 cycles     1/15/2024 -  Chemotherapy    alteplase (CATHFLO), 2 mg, Intracatheter, Every 1 Minute as needed, 5 of 5  cycles  fluorouracil (ADRUCIL) ambulatory infusion Soln, 225 mg/m2/day = 2,440 mg, Intravenous, Over 120 hours, 5 of 5 cycles      Radiation    Plan ID Energy Fractions Dose per Fraction (cGy) Dose Correction (cGy) Total Dose Delivered (cGy) Elapsed Days   CD Pelvis RA 6X 3 / 3 180 0 540 4   Wh Pelvis RA 6X 25 /  180 0 4,500 35      Treatment Dates:  2024 - 2024.          Past Medical History:   Diagnosis Date    Cancer (HCC)     rectal gone with treatment    Diabetes mellitus (HCC)     Hyperlipidemia      Past Surgical History:   Procedure Laterality Date    COLONOSCOPY      GALLBLADDER SURGERY      IR PORT PLACEMENT  2023    TONSILLECTOMY      UMBILICAL HERNIA REPAIR         Social History   Social History     Substance and Sexual Activity   Alcohol Use Never     Social History     Substance and Sexual Activity   Drug Use Never     Social History     Tobacco Use   Smoking Status Former    Current packs/day: 0.00    Average packs/day: 2.0 packs/day for 30.0 years (60.0 ttl pk-yrs)    Types: Cigarettes    Start date:     Quit date:     Years since quittin.5    Passive exposure: Past   Smokeless Tobacco Never         Meds/Allergies     Current Outpatient Medications:     buPROPion (WELLBUTRIN XL) 300 mg 24 hr tablet, Take 300 mg by mouth daily, Disp: , Rfl:     escitalopram (LEXAPRO) 20 mg tablet, Take 20 mg by mouth daily, Disp: , Rfl:     ibuprofen (MOTRIN) 200 mg tablet, Take 600 mg by mouth every 6 (six) hours as needed for headaches, Disp: , Rfl:     lisinopril (ZESTRIL) 5 mg tablet, Take 5 mg by mouth daily, Disp: , Rfl:     metFORMIN (GLUCOPHAGE) 1000 MG tablet, Take 1,000 mg by mouth 2 (two) times a day, Disp: , Rfl:     rosuvastatin (CRESTOR) 20 MG tablet, Take 20 mg by mouth daily, Disp: , Rfl:     lidocaine-prilocaine (EMLA) cream, Apply topically as needed for mild pain (Patient not taking: Reported on 2024), Disp: 1 g, Rfl: 1    omeprazole (PriLOSEC) 40 MG  capsule, Take 1 capsule (40 mg total) by mouth daily (Patient not taking: Reported on 7/17/2024), Disp: 30 capsule, Rfl: 2    ondansetron (ZOFRAN) 8 mg tablet, Take one tablet by mouth every 8 hours an needed for nausea (Patient not taking: Reported on 7/17/2024), Disp: 20 tablet, Rfl: 0    prochlorperazine (COMPAZINE) 10 mg tablet, Take 1 tablet (10 mg total) by mouth every 6 (six) hours as needed for nausea or vomiting (Patient not taking: Reported on 7/17/2024), Disp: 30 tablet, Rfl: 0    traMADol (Ultram) 50 mg tablet, Take 1 tablet (50 mg total) by mouth every 4 (four) hours as needed for moderate pain (Patient not taking: Reported on 7/17/2024), Disp: 120 tablet, Rfl: 0  Allergies   Allergen Reactions    Atorvastatin Myalgia         Review of Systems  Constitutional:  Positive for fatigue. Negative for unexpected weight change.   HENT:  Negative for mouth sores and sore throat.    Eyes: Negative.    Respiratory: Negative.     Cardiovascular: Negative.    Gastrointestinal:  Negative for anal bleeding, blood in stool, constipation, diarrhea, nausea, rectal pain and vomiting.   Endocrine: Positive for heat intolerance.   Musculoskeletal: Negative.    Allergic/Immunologic: Positive for environmental allergies.   Neurological:  Negative for weakness and numbness.   Psychiatric/Behavioral:  The patient is not nervous/anxious.        OBJECTIVE:   /86 (BP Location: Left arm, Patient Position: Sitting, Cuff Size: Large)   Pulse 96   Temp (!) 97 °F (36.1 °C) (Temporal)   Ht 6' (1.829 m)   Wt 96.6 kg (213 lb)   BMI 28.89 kg/m²   Pain Assessment:  0  Karnofsky: 90 - Able to carry on normal activity; minor signs or symptoms of disease     Physical Exam  HENT:      Head: Normocephalic and atraumatic.   Eyes:      General: No scleral icterus.     Extraocular Movements: Extraocular movements intact.   Cardiovascular:      Rate and Rhythm: Normal rate.   Pulmonary:      Effort: Pulmonary effort is normal.    Abdominal:      General: Abdomen is flat. There is no distension.   Genitourinary:     Comments: Deferred  Musculoskeletal:         General: Normal range of motion.      Cervical back: Normal range of motion.   Skin:     General: Skin is warm and dry.   Neurological:      General: No focal deficit present.      Mental Status: He is alert.   Psychiatric:         Mood and Affect: Mood normal.        RESULTS    Lab Results: No results found for this or any previous visit (from the past 672 hour(s)).    Imaging Studies:No results found.        Assessment/Plan:  No orders of the defined types were placed in this encounter.       Remi Olivares is a 59 y.o. male with stage IIIC [id number] distal rectal adenocarcinoma and prostate involvement who presented for consideration of radiotherapy as a component of total neoadjuvant therapy.  He completed initial FOLFOX and long course chemoradiation (1/16/24 - 2/26/24).  He has achieved a complete clinical response to nonoperative treatment.  He has declined surgery after discussing options with colorectal surgery.    I again recommended TME as the standard of care curative approach for patients with locally advanced/cT4 rectal cancer. Not all recurrences are salvageable at the time of detection.  Additionally recurrence may require more extensive surgery or additional therapies compared to those considered during initial treatment. He continues to decline surgical intervention at this time.    Nonoperative management requires a strict surveillance protocol coordinated with colorectal surgery and medical oncology.  This includes CEA every 3-6 months for 2 years then every 6 months for 5 years; AMRIK and proctoscopy or flexible sigmoidoscopy every 3-4 months for 2 years and then every 6 months for 5 years; MRI rectum every 6 months for at least 3 years; CT chest/abdomen every 6-12 months for a total of 5 years and a colonoscopy at 1 year following completion of therapy.      Going  "forward, he will return in 6 months.  He understands he will require follow up with his colorectal surgeon for proctoscopy or flexible sigmoidoscopy.  He was encouraged to call with any questions or concerns in the interim.    Neo Tsang MD  7/17/2024,8:39 AM    Portions of the record may have been created with voice recognition software.  Occasional wrong word or \"sound a like\" substitutions may have occurred due to the inherent limitations of voice recognition software.  Read the chart carefully and recognize, using context, where substitutions have occurred.        "

## 2024-08-22 ENCOUNTER — DOCUMENTATION (OUTPATIENT)
Dept: HEMATOLOGY ONCOLOGY | Facility: CLINIC | Age: 59
End: 2024-08-22

## 2024-08-22 NOTE — PROGRESS NOTES
Oncology Finance Advocacy Intake and Intervention    Oncology Finance Counselor/Advocate placed call to patient. This writer informed patient that this writer is here to assist patient with billing questions, financial assistance, payment/payment plans, quotes, copayment assistance, insurance optimization, and insurance navigation.      This writer conducted a thorough benefit review of copayment, deductible, and out of pocket cost. This information is documented below and has been reviewed with patient.     Out Of Pocket Cost: $6,600.00 / $4.18 Remaining  Insurance Optimization (Limited Benefit Vs Self-Pay): NA  Patient Assistance Status:     Interventions:     An introductory outreach call was made to the patient, who answered. This writer introduced herself and explained the scope of the Oncology  role. This writer provided contact information, reviewed insurance details, discussed the upcoming treatment date and time, and answered all questions.    Information above was review thoroughly with patient and patient was advise of possible assistance programs/interventions. If any question arise patient can contact this writer at below information. This information was given to patient at time of contact.      Chica Nunez MPH  Phone: [phone]  Email: [email or web address]

## 2024-08-23 ENCOUNTER — HOSPITAL ENCOUNTER (OUTPATIENT)
Dept: INFUSION CENTER | Facility: HOSPITAL | Age: 59
End: 2024-08-23
Payer: COMMERCIAL

## 2024-08-23 DIAGNOSIS — C77.9 REGIONAL LYMPH NODE METASTASIS PRESENT (HCC): ICD-10-CM

## 2024-08-23 DIAGNOSIS — C20 RECTAL ADENOCARCINOMA (HCC): ICD-10-CM

## 2024-08-23 DIAGNOSIS — Z95.828 PORT-A-CATH IN PLACE: Primary | ICD-10-CM

## 2024-08-23 DIAGNOSIS — R97.8 ABNORMAL TUMOR MARKERS: ICD-10-CM

## 2024-08-23 LAB
ALBUMIN SERPL BCG-MCNC: 4.4 G/DL (ref 3.5–5)
ALP SERPL-CCNC: 66 U/L (ref 34–104)
ALT SERPL W P-5'-P-CCNC: 28 U/L (ref 7–52)
ANION GAP SERPL CALCULATED.3IONS-SCNC: 5 MMOL/L (ref 4–13)
AST SERPL W P-5'-P-CCNC: 17 U/L (ref 13–39)
BASOPHILS # BLD AUTO: 0.03 THOUSANDS/ÂΜL (ref 0–0.1)
BASOPHILS NFR BLD AUTO: 1 % (ref 0–1)
BILIRUB SERPL-MCNC: 0.78 MG/DL (ref 0.2–1)
BUN SERPL-MCNC: 16 MG/DL (ref 5–25)
CALCIUM SERPL-MCNC: 9.4 MG/DL (ref 8.4–10.2)
CEA SERPL-MCNC: 1.5 NG/ML (ref 0–3)
CHLORIDE SERPL-SCNC: 106 MMOL/L (ref 96–108)
CO2 SERPL-SCNC: 27 MMOL/L (ref 21–32)
CREAT SERPL-MCNC: 0.7 MG/DL (ref 0.6–1.3)
EOSINOPHIL # BLD AUTO: 0.08 THOUSAND/ÂΜL (ref 0–0.61)
EOSINOPHIL NFR BLD AUTO: 2 % (ref 0–6)
ERYTHROCYTE [DISTWIDTH] IN BLOOD BY AUTOMATED COUNT: 13.2 % (ref 11.6–15.1)
GFR SERPL CREATININE-BSD FRML MDRD: 104 ML/MIN/1.73SQ M
GLUCOSE SERPL-MCNC: 187 MG/DL (ref 65–140)
HCT VFR BLD AUTO: 44.5 % (ref 36.5–49.3)
HGB BLD-MCNC: 15 G/DL (ref 12–17)
IMM GRANULOCYTES # BLD AUTO: 0.03 THOUSAND/UL (ref 0–0.2)
IMM GRANULOCYTES NFR BLD AUTO: 1 % (ref 0–2)
LYMPHOCYTES # BLD AUTO: 0.71 THOUSANDS/ÂΜL (ref 0.6–4.47)
LYMPHOCYTES NFR BLD AUTO: 17 % (ref 14–44)
MCH RBC QN AUTO: 32.5 PG (ref 26.8–34.3)
MCHC RBC AUTO-ENTMCNC: 33.7 G/DL (ref 31.4–37.4)
MCV RBC AUTO: 97 FL (ref 82–98)
MONOCYTES # BLD AUTO: 0.41 THOUSAND/ÂΜL (ref 0.17–1.22)
MONOCYTES NFR BLD AUTO: 10 % (ref 4–12)
NEUTROPHILS # BLD AUTO: 3.05 THOUSANDS/ÂΜL (ref 1.85–7.62)
NEUTS SEG NFR BLD AUTO: 69 % (ref 43–75)
NRBC BLD AUTO-RTO: 0 /100 WBCS
PLATELET # BLD AUTO: 222 THOUSANDS/UL (ref 149–390)
PMV BLD AUTO: 8.5 FL (ref 8.9–12.7)
POTASSIUM SERPL-SCNC: 4.6 MMOL/L (ref 3.5–5.3)
PROT SERPL-MCNC: 7 G/DL (ref 6.4–8.4)
RBC # BLD AUTO: 4.61 MILLION/UL (ref 3.88–5.62)
SODIUM SERPL-SCNC: 138 MMOL/L (ref 135–147)
WBC # BLD AUTO: 4.31 THOUSAND/UL (ref 4.31–10.16)

## 2024-08-23 PROCEDURE — 36593 DECLOT VASCULAR DEVICE: CPT

## 2024-08-23 PROCEDURE — 85025 COMPLETE CBC W/AUTO DIFF WBC: CPT

## 2024-08-23 PROCEDURE — 80053 COMPREHEN METABOLIC PANEL: CPT

## 2024-08-23 PROCEDURE — 82378 CARCINOEMBRYONIC ANTIGEN: CPT

## 2024-08-23 RX ADMIN — ALTEPLASE 2 MG: 2.2 INJECTION, POWDER, LYOPHILIZED, FOR SOLUTION INTRAVENOUS at 10:14

## 2024-08-23 RX ADMIN — ALTEPLASE 2 MG: 2.2 INJECTION, POWDER, LYOPHILIZED, FOR SOLUTION INTRAVENOUS at 08:16

## 2024-08-23 NOTE — PROGRESS NOTES
After accessing Remi's port for blood work, there was no blood return. Following protocol, I instilled CATHFLO x2 doses without blood blood return. I notified Dr. Carrillo's nurse Lauren Brennan RN, who put in a note OK to draw labs peripherally and explained that the office will call Remi to schedule a port study with IR, which I relayed to Reim, who verbally understood.Labs were drawn and sent to the lab.     Remi Olivares is aware of future appt on 10/7 at 0800.     AVS printed and given to Remi Olivares:    No (Declined by Remi Olivares)

## 2024-08-26 ENCOUNTER — PREP FOR PROCEDURE (OUTPATIENT)
Dept: INTERVENTIONAL RADIOLOGY/VASCULAR | Facility: CLINIC | Age: 59
End: 2024-08-26

## 2024-08-26 ENCOUNTER — OFFICE VISIT (OUTPATIENT)
Dept: HEMATOLOGY ONCOLOGY | Facility: CLINIC | Age: 59
End: 2024-08-26
Payer: COMMERCIAL

## 2024-08-26 VITALS
HEIGHT: 72 IN | BODY MASS INDEX: 28.78 KG/M2 | OXYGEN SATURATION: 98 % | SYSTOLIC BLOOD PRESSURE: 124 MMHG | TEMPERATURE: 98.7 F | WEIGHT: 212.5 LBS | RESPIRATION RATE: 16 BRPM | DIASTOLIC BLOOD PRESSURE: 82 MMHG | HEART RATE: 72 BPM

## 2024-08-26 DIAGNOSIS — Z95.828 PORT-A-CATH IN PLACE: ICD-10-CM

## 2024-08-26 DIAGNOSIS — R97.8 ABNORMAL TUMOR MARKERS: ICD-10-CM

## 2024-08-26 DIAGNOSIS — G47.00 INSOMNIA, UNSPECIFIED TYPE: ICD-10-CM

## 2024-08-26 DIAGNOSIS — E78.5 DYSLIPIDEMIA: ICD-10-CM

## 2024-08-26 DIAGNOSIS — R53.83 TIRED: ICD-10-CM

## 2024-08-26 DIAGNOSIS — C20 RECTAL ADENOCARCINOMA (HCC): Primary | ICD-10-CM

## 2024-08-26 DIAGNOSIS — T45.1X5A CHEMOTHERAPY INDUCED DIARRHEA: ICD-10-CM

## 2024-08-26 DIAGNOSIS — K52.1 CHEMOTHERAPY INDUCED DIARRHEA: ICD-10-CM

## 2024-08-26 DIAGNOSIS — C77.9 REGIONAL LYMPH NODE METASTASIS PRESENT (HCC): ICD-10-CM

## 2024-08-26 DIAGNOSIS — Z95.828 PORT-A-CATH IN PLACE: Primary | ICD-10-CM

## 2024-08-26 DIAGNOSIS — Z86.39 HISTORY OF DIABETES MELLITUS: ICD-10-CM

## 2024-08-26 PROCEDURE — 99214 OFFICE O/P EST MOD 30 MIN: CPT | Performed by: INTERNAL MEDICINE

## 2024-08-26 RX ORDER — EMPAGLIFLOZIN 10 MG/1
10 TABLET, FILM COATED ORAL DAILY
COMMUNITY
Start: 2024-07-22

## 2024-08-26 NOTE — PROGRESS NOTES
HPI: Patient is here with his wife.  This is continuation of care for rectal cancer.  In August 2023 patient was diagnosed to have low rectal adenocarcinoma.  Patient received  total neoadjuvant therapy, FOLFOX followed by 5-FU infusion and concurrent radiation.  Patient had  CT scans, MRI pelvis, sigmoidoscopy and EGD and there was no documented malignancy.  Patient's rectal surgeon gave him the options of no surgery versus surgery and he opted not to have surgery.  He will have blood counts, chemistry and CEA once every 12 weeks and so and visits after that.   There is some problem with his Port-A-Cath and he is being referred to IR to get that checked.  Patient has manageable  diarrhea and uses Imodium.  No other GI symptoms.  No rectal pain.  No rectal bleeding.  No weight loss.  Good appetite.  He feels tired.  He does not get good night sleep.     A. Colon, random colon biopsy:  - Fragments of colonic mucosa with increased intraepithelial lymphocytes and focal surface epithelial damage.  - There is no increase in subepithelial collagen thickness. See note.     Note (A): The above morphologic features may be compatible with lymphocytic (microscopic) colitis in conjunction with appropriate clinical and endoscopic findings.       B. Large Intestine, Transverse Colon, hot snare transverse colon polyp:  - Tubular adenoma, fragments.  - Negative for high grade dysplasia/ carcinoma.     C. Large Intestine, Left/Descending Colon, hot snare descending colon polyp:  - Benign colonic mucosa without significant microscopic abnormality.     D. Large Intestine, Sigmoid Colon, hot snare mid sigmoid polyp:  - Tubular adenoma.  - Negative for high grade dysplasia/ carcinoma.     E. Large Intestine, Sigmoid Colon, distal sigmoid polyps- hot snare  x1 / cold snare  2:  - Tubular adenoma x 2, negative for high grade dysplasia/ carcinoma.  - Hyperplastic polyp, negative for dysplasia/ carcinoma.      F. Rectum, biopsy rectal  mass:  - Adenocarcinoma, superficial fragments. See note.  - MMR panel is pending.     Note (F): This case was reviewed at the intradepartmental  conference.   Dr. Quinones is notified of the diagnosis in [employer] via [employer] on 2023  at 1.30 pm.   Electronically signed by Griffin Stevenson MD on 2023 at  1:41 PM     .Antibody          Clone               Description                           Results  MLH1               M1                   Mismatch repair protein       Intact nuclear expression  MSH2              [id number]       Mismatch repair protein       Intact nuclear expression  MSH6              44                     Mismatch repair protein       Intact nuclear expression  PMS2              [id number]           Mismatch repair protein       Intact nuclear expression     Historical Information   Past Medical History:   Diagnosis Date   • Cancer (HCC)     rectal gone with treatment   • Diabetes mellitus (HCC)    • Hyperlipidemia      Past Surgical History:   Procedure Laterality Date   • COLONOSCOPY     • GALLBLADDER SURGERY     • IR PORT PLACEMENT  2023   • TONSILLECTOMY     • UMBILICAL HERNIA REPAIR       Social History   Social History     Substance and Sexual Activity   Alcohol Use Never     Social History     Substance and Sexual Activity   Drug Use Never     Social History     Tobacco Use   Smoking Status Former   • Current packs/day: 0.00   • Average packs/day: 2.0 packs/day for 30.0 years (60.0 ttl pk-yrs)   • Types: Cigarettes   • Start date:    • Quit date:    • Years since quittin.6   • Passive exposure: Past   Smokeless Tobacco Never     Family History:   Family History   Problem Relation Age of Onset   • No Known Problems Mother    • No Known Problems Father          Current Outpatient Medications:   •  buPROPion (WELLBUTRIN XL) 300 mg 24 hr tablet, Take 300 mg by mouth daily, Disp: , Rfl:   •  escitalopram (LEXAPRO) 20 mg tablet, Take 20 mg  by mouth daily, Disp: , Rfl:   •  ibuprofen (MOTRIN) 200 mg tablet, Take 600 mg by mouth every 6 (six) hours as needed for headaches, Disp: , Rfl:   •  Jardiance 10 MG TABS tablet, Take 10 mg by mouth daily, Disp: , Rfl:   •  lisinopril (ZESTRIL) 5 mg tablet, Take 5 mg by mouth daily, Disp: , Rfl:   •  metFORMIN (GLUCOPHAGE) 1000 MG tablet, Take 1,000 mg by mouth 2 (two) times a day, Disp: , Rfl:   •  rosuvastatin (CRESTOR) 20 MG tablet, Take 20 mg by mouth daily, Disp: , Rfl:   •  lidocaine-prilocaine (EMLA) cream, Apply topically as needed for mild pain (Patient not taking: Reported on 7/17/2024), Disp: 1 g, Rfl: 1  •  ondansetron (ZOFRAN) 8 mg tablet, Take one tablet by mouth every 8 hours an needed for nausea (Patient not taking: Reported on 7/17/2024), Disp: 20 tablet, Rfl: 0  •  prochlorperazine (COMPAZINE) 10 mg tablet, Take 1 tablet (10 mg total) by mouth every 6 (six) hours as needed for nausea or vomiting (Patient not taking: Reported on 7/17/2024), Disp: 30 tablet, Rfl: 0  •  traMADol (Ultram) 50 mg tablet, Take 1 tablet (50 mg total) by mouth every 4 (four) hours as needed for moderate pain (Patient not taking: Reported on 7/17/2024), Disp: 120 tablet, Rfl: 0  No current facility-administered medications for this visit.    Allergies   Allergen Reactions   • Atorvastatin Myalgia   ROS:  09/13/23 Reviewed 12 systems: See symptoms in HPI  Presently no other neurological, cardiac, pulmonary, GI and  symptoms other than listed in HPI.  Other symptoms are in HPI.  No  fever, chills, bleeding, bone pains, skin rash, weight loss, night sweats, arthritic symptoms,   weakness, numbness, claudication and gait problem. No frequent infections.  Not unusually sensitive to heat or cold. No swelling of the ankles. No swollen glands.  Patient is much less anxious.     Physical Exam:  Vitals:    08/26/24 1439   BP: 124/82   BP Location: Left arm   Patient Position: Sitting   Cuff Size: Adult   Pulse: 72   Resp: 16    Temp: 98.7 °F (37.1 °C)   TempSrc: Temporal   SpO2: 98%   Weight: 96.4 kg (212 lb 8 oz)   Height: 6' (1.829 m)     Alert and oriented and not in distress.  Vitals are above.  No icterus.  No oral thrush.  No palpable neck mass.  Clear lung fields.  Regular heart rate.  There is no palpable abdominal mass.  Abdomen is soft and nontender.  No ascites.  No edema of the ankles.  No calf tenderness.  No focal neurological deficit.  No skin rash.  There is no   palpable lymphadenopathy in the neck and axillary areas,  no clubbing.   Patient is anxious.  Performance status 1 .      Lab Results: I have reviewed all pertinent labs.  LABS:    Results for orders placed or performed during the hospital encounter of 08/23/24   CBC and differential   Result Value Ref Range    WBC 4.31 4.31 - 10.16 Thousand/uL    RBC 4.61 3.88 - 5.62 Million/uL    Hemoglobin 15.0 12.0 - 17.0 g/dL    Hematocrit 44.5 36.5 - 49.3 %    MCV 97 82 - 98 fL    MCH 32.5 26.8 - 34.3 pg    MCHC 33.7 31.4 - 37.4 g/dL    RDW 13.2 11.6 - 15.1 %    MPV 8.5 (L) 8.9 - 12.7 fL    Platelets 222 149 - 390 Thousands/uL    nRBC 0 /100 WBCs    Segmented % 69 43 - 75 %    Immature Grans % 1 0 - 2 %    Lymphocytes % 17 14 - 44 %    Monocytes % 10 4 - 12 %    Eosinophils Relative 2 0 - 6 %    Basophils Relative 1 0 - 1 %    Absolute Neutrophils 3.05 1.85 - 7.62 Thousands/µL    Absolute Immature Grans 0.03 0.00 - 0.20 Thousand/uL    Absolute Lymphocytes 0.71 0.60 - 4.47 Thousands/µL    Absolute Monocytes 0.41 0.17 - 1.22 Thousand/µL    Eosinophils Absolute 0.08 0.00 - 0.61 Thousand/µL    Basophils Absolute 0.03 0.00 - 0.10 Thousands/µL   Comprehensive metabolic panel   Result Value Ref Range    Sodium 138 135 - 147 mmol/L    Potassium 4.6 3.5 - 5.3 mmol/L    Chloride 106 96 - 108 mmol/L    CO2 27 21 - 32 mmol/L    ANION GAP 5 4 - 13 mmol/L    BUN 16 5 - 25 mg/dL    Creatinine 0.70 0.60 - 1.30 mg/dL    Glucose 187 (H) 65 - 140 mg/dL    Calcium 9.4 8.4 - 10.2 mg/dL    AST  17 13 - 39 U/L    ALT 28 7 - 52 U/L    Alkaline Phosphatase 66 34 - 104 U/L    Total Protein 7.0 6.4 - 8.4 g/dL    Albumin 4.4 3.5 - 5.0 g/dL    Total Bilirubin 0.78 0.20 - 1.00 mg/dL    eGFR 104 ml/min/1.73sq m   CEA   Result Value Ref Range    CEA 1.5 0.0 - 3.0 ng/mL                              Imaging Studies: I have personally reviewed pertinent reports.    ASSESSMENT: Complete/near complete response (please note this imaging appearance does not rule out small volume residual viable tumor).     SUSPICIOUS MESORECTAL LYMPH NODES: No.     SUSPICIOUS EXTRAMESORECTAL LYMPH NODES: No.        Workstation performed: [id number]        Imaging    MRI pelvis rectal cancer staging wo contrast (Order: [id number]) - 4/4/2024  CT chest abdomen pelvis w contrast  Status: Final result     PACS Images     Show images for CT chest abdomen pelvis w contrast    CT chest abdomen pelvis w contrast: Result Notes     Silvana Carmichael  3/25/2024 10:59 AM EDT       Discussed results w/ pt's spouse.    Eleazar Aguilar MD  3/22/2024  1:38 PM EDT       Office please let Remi know that there is no obvious metastatic disease chest/abdomen/pelvis, he does have stomach wall thickening, I have included Dr. Quinones to discuss and possibly schedule endoscopy.            Study Result    Narrative & Impression   CT CHEST, ABDOMEN AND PELVIS WITH IV CONTRAST     INDICATION: C20: Malignant neoplasm of rectum.     COMPARISON: CT chest, abdomen and pelvis 9/7/2024     TECHNIQUE: CT examination of the chest, abdomen and pelvis was performed. Multiplanar 2D reformatted images were created from the source data.     This examination, like all CT scans performed in the [facility], was performed utilizing techniques to minimize radiation dose exposure, including the use of iterative reconstruction and automated exposure control. Radiation dose length   product (DLP) for this visit: 678.37 mGy-cm     IV Contrast: 100 mL of iohexol  (OMNIPAQUE)  Enteric Contrast: Not administered.     FINDINGS:     CHEST     LUNGS: Lungs are clear. No tracheal or endobronchial lesion.     PLEURA: Unremarkable.     HEART/GREAT VESSELS: Heart is unremarkable for patient's age. No thoracic aortic aneurysm.     MEDIASTINUM AND GIANA: Moderate-sized hiatal hernia with asymmetric wall thickening along the lateral aspect of the herniated stomach (2/92), not definitely identified on 9/7/2023. Potential nodular soft tissue component in the superior aspect of the   hiatal hernia versus ingested material (2/81)     CHEST WALL AND LOWER NECK: Right-sided Mediport     ABDOMEN     LIVER/BILIARY TREE: Unremarkable.     GALLBLADDER: Post cholecystectomy.     SPLEEN: Unremarkable.     PANCREAS: Unremarkable.     ADRENAL GLANDS: Unremarkable.     KIDNEYS/URETERS: Unremarkable. No hydronephrosis.     STOMACH AND BOWEL: Known rectal neoplasm cannot be assessed on this examination. There is no bowel obstruction. Orally administered contrast material reaches the rectum..     APPENDIX: Normal.     ABDOMINOPELVIC CAVITY: No ascites. No pneumoperitoneum. No lymphadenopathy.     VESSELS: Atherosclerosis without abdominal aortic aneurysm.     PELVIS     REPRODUCTIVE ORGANS: Unremarkable for patient's age.     URINARY BLADDER: Under distended, limiting evaluation. Mild wall thickening of the under distended urinary bladder, favored to represent posttreatment changes     ABDOMINAL WALL/INGUINAL REGIONS: Unremarkable.     BONES: No acute fracture or suspicious osseous lesion. Postradiation changes in the sacrum.     IMPRESSION:     No metastatic lesions in the chest, abdomen and pelvis     Asymmetric wall thickening along the lateral aspect of the herniated stomach with apparent shouldering (2/92), which can represent prominent gastric wall rugae, adherent ingested material or gastric wall neoplasm. Potential nodular soft tissue component   versus adherent ingested material in the superior  aspect of the hernia as well. Endoscopic assessment is recommended for further differentiation     Mild thickening of the underdistended urinary bladder wall, likely representing postradiation changes. Clinical correlation is recommended to exclude possibility of acute cystitis.     The study was marked in EPIC for significant notification.              Workstation performed: [id number]        Imaging    CT chest abdomen pelvis w contrast (Order: [id number]) - 3/14/2024  Pathology, and Other Studies: I have personally reviewed pertinent reports.    See above    Assessment and Plan:  See diagnoses, orders instructions below    Patient is here with his wife.  This is continuation of care for rectal cancer.  In August 2023 patient was diagnosed to have low rectal adenocarcinoma.  Patient received  total neoadjuvant therapy, FOLFOX followed by 5-FU infusion and concurrent radiation.  Patient had  CT scans, MRI pelvis, sigmoidoscopy and EGD and there was no documented malignancy.  Patient's rectal surgeon gave him the options of no surgery versus surgery and he opted not to have surgery.  He will have blood counts, chemistry and CEA once every 12 weeks and so and visits after that.   There is some problem with his Port-A-Cath and he is being referred to IR to get that checked.  Patient has manageable  diarrhea and uses Imodium.  No other GI symptoms.  No rectal pain.  No rectal bleeding.  No weight loss.  Good appetite.  He feels tired.  He does not get good night sleep.     A. Colon, random colon biopsy:  - Fragments of colonic mucosa with increased intraepithelial lymphocytes and focal surface epithelial damage.  - There is no increase in subepithelial collagen thickness. See note.     Note (A): The above morphologic features may be compatible with lymphocytic (microscopic) colitis in conjunction with appropriate clinical and endoscopic findings.       B. Large Intestine, Transverse Colon, hot snare transverse colon  polyp:  - Tubular adenoma, fragments.  - Negative for high grade dysplasia/ carcinoma.     C. Large Intestine, Left/Descending Colon, hot snare descending colon polyp:  - Benign colonic mucosa without significant microscopic abnormality.     D. Large Intestine, Sigmoid Colon, hot snare mid sigmoid polyp:  - Tubular adenoma.  - Negative for high grade dysplasia/ carcinoma.     E. Large Intestine, Sigmoid Colon, distal sigmoid polyps- hot snare  x1 / cold snare  2:  - Tubular adenoma x 2, negative for high grade dysplasia/ carcinoma.  - Hyperplastic polyp, negative for dysplasia/ carcinoma.      F. Rectum, biopsy rectal mass:  - Adenocarcinoma, superficial fragments. See note.  - MMR panel is pending.     Note (F): This case was reviewed at the intradepartmental  conference.   Dr. Quinones is notified of the diagnosis in [employer] via [employer] on 08/25/2023  at 1.30 pm.   Electronically signed by Griffin Steevnson MD on 8/25/2023 at  1:41 PM     .Antibody          Clone               Description                           Results  MLH1               M1                   Mismatch repair protein       Intact nuclear expression  MSH2              [id number]       Mismatch repair protein       Intact nuclear expression  MSH6              44                     Mismatch repair protein       Intact nuclear expression  PMS2              [id number]           Mismatch repair protein       Intact nuclear expression       Physical examination and test results are as recorded and discussed.  Patient  completed treatments  for rectal cancer.  He opted not to have surgery.  Plan is surveillance.  Referral to IR for nonfunctioning Port-A-Cath.    Diet and activities as tolerated. Health screening tests.  Patient is capable of self-care.  All discussed in very much detail.  Questions answered.    See diagnoses, orders and instructions below.    1. Rectal adenocarcinoma (HCC)    - Ambulatory Referral to Interventional  Radiology; Future  - CT chest abdomen pelvis w contrast; Future  - CBC and differential; Future  - Comprehensive metabolic panel; Future  - CEA; Future  - CBC and differential  - Comprehensive metabolic panel  - CEA    2. Regional lymph node metastasis present (HCC)    - CT chest abdomen pelvis w contrast; Future  - CBC and differential; Future  - Comprehensive metabolic panel; Future  - CEA; Future  - CBC and differential  - Comprehensive metabolic panel  - CEA    3. Abnormal tumor markers      4. Port-A-Cath in place    - Ambulatory Referral to Interventional Radiology; Future    5. Dyslipidemia      6. Insomnia, unspecified type      7. History of diabetes mellitus      8. Chemotherapy induced diarrhea      9. Tired      Blood work prior to CT scan in October 2024.  Patient to be evaluated by IR as soon as possible for nonfunctional Port-A-Cath.  I did contact IR physician.  Follow-up in 3 months.      Disclaimer: This document was prepared using dictation device.  If a word or phrase is confusing, or does not make sense, this is likely due to recognition error which was not discovered during the providers review. If you believe an error has occurred, please Contact me through [facility] HOPE Line service for tiara?cation.    Counseling / Coordination of Care  ..  Provided counseling and support

## 2024-08-26 NOTE — H&P (VIEW-ONLY)
HPI: Patient is here with his wife.  This is continuation of care for rectal cancer.  In August 2023 patient was diagnosed to have low rectal adenocarcinoma.  Patient received  total neoadjuvant therapy, FOLFOX followed by 5-FU infusion and concurrent radiation.  Patient had  CT scans, MRI pelvis, sigmoidoscopy and EGD and there was no documented malignancy.  Patient's rectal surgeon gave him the options of no surgery versus surgery and he opted not to have surgery.  He will have blood counts, chemistry and CEA once every 12 weeks and so and visits after that.   There is some problem with his Port-A-Cath and he is being referred to IR to get that checked.  Patient has manageable  diarrhea and uses Imodium.  No other GI symptoms.  No rectal pain.  No rectal bleeding.  No weight loss.  Good appetite.  He feels tired.  He does not get good night sleep.     A. Colon, random colon biopsy:  - Fragments of colonic mucosa with increased intraepithelial lymphocytes and focal surface epithelial damage.  - There is no increase in subepithelial collagen thickness. See note.     Note (A): The above morphologic features may be compatible with lymphocytic (microscopic) colitis in conjunction with appropriate clinical and endoscopic findings.       B. Large Intestine, Transverse Colon, hot snare transverse colon polyp:  - Tubular adenoma, fragments.  - Negative for high grade dysplasia/ carcinoma.     C. Large Intestine, Left/Descending Colon, hot snare descending colon polyp:  - Benign colonic mucosa without significant microscopic abnormality.     D. Large Intestine, Sigmoid Colon, hot snare mid sigmoid polyp:  - Tubular adenoma.  - Negative for high grade dysplasia/ carcinoma.     E. Large Intestine, Sigmoid Colon, distal sigmoid polyps- hot snare  x1 / cold snare  2:  - Tubular adenoma x 2, negative for high grade dysplasia/ carcinoma.  - Hyperplastic polyp, negative for dysplasia/ carcinoma.      F. Rectum, biopsy rectal  mass:  - Adenocarcinoma, superficial fragments. See note.  - MMR panel is pending.     Note (F): This case was reviewed at the intradepartmental  conference.   Dr. Quinones is notified of the diagnosis in [employer] via [employer] on 2023  at 1.30 pm.   Electronically signed by Griffin Stevenson MD on 2023 at  1:41 PM     .Antibody          Clone               Description                           Results  MLH1               M1                   Mismatch repair protein       Intact nuclear expression  MSH2              [id number]       Mismatch repair protein       Intact nuclear expression  MSH6              44                     Mismatch repair protein       Intact nuclear expression  PMS2              [id number]           Mismatch repair protein       Intact nuclear expression     Historical Information   Past Medical History:   Diagnosis Date   • Cancer (HCC)     rectal gone with treatment   • Diabetes mellitus (HCC)    • Hyperlipidemia      Past Surgical History:   Procedure Laterality Date   • COLONOSCOPY     • GALLBLADDER SURGERY     • IR PORT PLACEMENT  2023   • TONSILLECTOMY     • UMBILICAL HERNIA REPAIR       Social History   Social History     Substance and Sexual Activity   Alcohol Use Never     Social History     Substance and Sexual Activity   Drug Use Never     Social History     Tobacco Use   Smoking Status Former   • Current packs/day: 0.00   • Average packs/day: 2.0 packs/day for 30.0 years (60.0 ttl pk-yrs)   • Types: Cigarettes   • Start date:    • Quit date:    • Years since quittin.6   • Passive exposure: Past   Smokeless Tobacco Never     Family History:   Family History   Problem Relation Age of Onset   • No Known Problems Mother    • No Known Problems Father          Current Outpatient Medications:   •  buPROPion (WELLBUTRIN XL) 300 mg 24 hr tablet, Take 300 mg by mouth daily, Disp: , Rfl:   •  escitalopram (LEXAPRO) 20 mg tablet, Take 20 mg  by mouth daily, Disp: , Rfl:   •  ibuprofen (MOTRIN) 200 mg tablet, Take 600 mg by mouth every 6 (six) hours as needed for headaches, Disp: , Rfl:   •  Jardiance 10 MG TABS tablet, Take 10 mg by mouth daily, Disp: , Rfl:   •  lisinopril (ZESTRIL) 5 mg tablet, Take 5 mg by mouth daily, Disp: , Rfl:   •  metFORMIN (GLUCOPHAGE) 1000 MG tablet, Take 1,000 mg by mouth 2 (two) times a day, Disp: , Rfl:   •  rosuvastatin (CRESTOR) 20 MG tablet, Take 20 mg by mouth daily, Disp: , Rfl:   •  lidocaine-prilocaine (EMLA) cream, Apply topically as needed for mild pain (Patient not taking: Reported on 7/17/2024), Disp: 1 g, Rfl: 1  •  ondansetron (ZOFRAN) 8 mg tablet, Take one tablet by mouth every 8 hours an needed for nausea (Patient not taking: Reported on 7/17/2024), Disp: 20 tablet, Rfl: 0  •  prochlorperazine (COMPAZINE) 10 mg tablet, Take 1 tablet (10 mg total) by mouth every 6 (six) hours as needed for nausea or vomiting (Patient not taking: Reported on 7/17/2024), Disp: 30 tablet, Rfl: 0  •  traMADol (Ultram) 50 mg tablet, Take 1 tablet (50 mg total) by mouth every 4 (four) hours as needed for moderate pain (Patient not taking: Reported on 7/17/2024), Disp: 120 tablet, Rfl: 0  No current facility-administered medications for this visit.    Allergies   Allergen Reactions   • Atorvastatin Myalgia   ROS:  09/13/23 Reviewed 12 systems: See symptoms in HPI  Presently no other neurological, cardiac, pulmonary, GI and  symptoms other than listed in HPI.  Other symptoms are in HPI.  No  fever, chills, bleeding, bone pains, skin rash, weight loss, night sweats, arthritic symptoms,   weakness, numbness, claudication and gait problem. No frequent infections.  Not unusually sensitive to heat or cold. No swelling of the ankles. No swollen glands.  Patient is much less anxious.     Physical Exam:  Vitals:    08/26/24 1439   BP: 124/82   BP Location: Left arm   Patient Position: Sitting   Cuff Size: Adult   Pulse: 72   Resp: 16    Temp: 98.7 °F (37.1 °C)   TempSrc: Temporal   SpO2: 98%   Weight: 96.4 kg (212 lb 8 oz)   Height: 6' (1.829 m)     Alert and oriented and not in distress.  Vitals are above.  No icterus.  No oral thrush.  No palpable neck mass.  Clear lung fields.  Regular heart rate.  There is no palpable abdominal mass.  Abdomen is soft and nontender.  No ascites.  No edema of the ankles.  No calf tenderness.  No focal neurological deficit.  No skin rash.  There is no   palpable lymphadenopathy in the neck and axillary areas,  no clubbing.   Patient is anxious.  Performance status 1 .      Lab Results: I have reviewed all pertinent labs.  LABS:    Results for orders placed or performed during the hospital encounter of 08/23/24   CBC and differential   Result Value Ref Range    WBC 4.31 4.31 - 10.16 Thousand/uL    RBC 4.61 3.88 - 5.62 Million/uL    Hemoglobin 15.0 12.0 - 17.0 g/dL    Hematocrit 44.5 36.5 - 49.3 %    MCV 97 82 - 98 fL    MCH 32.5 26.8 - 34.3 pg    MCHC 33.7 31.4 - 37.4 g/dL    RDW 13.2 11.6 - 15.1 %    MPV 8.5 (L) 8.9 - 12.7 fL    Platelets 222 149 - 390 Thousands/uL    nRBC 0 /100 WBCs    Segmented % 69 43 - 75 %    Immature Grans % 1 0 - 2 %    Lymphocytes % 17 14 - 44 %    Monocytes % 10 4 - 12 %    Eosinophils Relative 2 0 - 6 %    Basophils Relative 1 0 - 1 %    Absolute Neutrophils 3.05 1.85 - 7.62 Thousands/µL    Absolute Immature Grans 0.03 0.00 - 0.20 Thousand/uL    Absolute Lymphocytes 0.71 0.60 - 4.47 Thousands/µL    Absolute Monocytes 0.41 0.17 - 1.22 Thousand/µL    Eosinophils Absolute 0.08 0.00 - 0.61 Thousand/µL    Basophils Absolute 0.03 0.00 - 0.10 Thousands/µL   Comprehensive metabolic panel   Result Value Ref Range    Sodium 138 135 - 147 mmol/L    Potassium 4.6 3.5 - 5.3 mmol/L    Chloride 106 96 - 108 mmol/L    CO2 27 21 - 32 mmol/L    ANION GAP 5 4 - 13 mmol/L    BUN 16 5 - 25 mg/dL    Creatinine 0.70 0.60 - 1.30 mg/dL    Glucose 187 (H) 65 - 140 mg/dL    Calcium 9.4 8.4 - 10.2 mg/dL    AST  17 13 - 39 U/L    ALT 28 7 - 52 U/L    Alkaline Phosphatase 66 34 - 104 U/L    Total Protein 7.0 6.4 - 8.4 g/dL    Albumin 4.4 3.5 - 5.0 g/dL    Total Bilirubin 0.78 0.20 - 1.00 mg/dL    eGFR 104 ml/min/1.73sq m   CEA   Result Value Ref Range    CEA 1.5 0.0 - 3.0 ng/mL                              Imaging Studies: I have personally reviewed pertinent reports.    ASSESSMENT: Complete/near complete response (please note this imaging appearance does not rule out small volume residual viable tumor).     SUSPICIOUS MESORECTAL LYMPH NODES: No.     SUSPICIOUS EXTRAMESORECTAL LYMPH NODES: No.        Workstation performed: [id number]        Imaging    MRI pelvis rectal cancer staging wo contrast (Order: [id number]) - 4/4/2024  CT chest abdomen pelvis w contrast  Status: Final result     PACS Images     Show images for CT chest abdomen pelvis w contrast    CT chest abdomen pelvis w contrast: Result Notes     Silvana Carmichael  3/25/2024 10:59 AM EDT       Discussed results w/ pt's spouse.    Eleazar Aguilar MD  3/22/2024  1:38 PM EDT       Office please let Remi know that there is no obvious metastatic disease chest/abdomen/pelvis, he does have stomach wall thickening, I have included Dr. Quinones to discuss and possibly schedule endoscopy.            Study Result    Narrative & Impression   CT CHEST, ABDOMEN AND PELVIS WITH IV CONTRAST     INDICATION: C20: Malignant neoplasm of rectum.     COMPARISON: CT chest, abdomen and pelvis 9/7/2024     TECHNIQUE: CT examination of the chest, abdomen and pelvis was performed. Multiplanar 2D reformatted images were created from the source data.     This examination, like all CT scans performed in the [facility], was performed utilizing techniques to minimize radiation dose exposure, including the use of iterative reconstruction and automated exposure control. Radiation dose length   product (DLP) for this visit: 678.37 mGy-cm     IV Contrast: 100 mL of iohexol  (OMNIPAQUE)  Enteric Contrast: Not administered.     FINDINGS:     CHEST     LUNGS: Lungs are clear. No tracheal or endobronchial lesion.     PLEURA: Unremarkable.     HEART/GREAT VESSELS: Heart is unremarkable for patient's age. No thoracic aortic aneurysm.     MEDIASTINUM AND GIANA: Moderate-sized hiatal hernia with asymmetric wall thickening along the lateral aspect of the herniated stomach (2/92), not definitely identified on 9/7/2023. Potential nodular soft tissue component in the superior aspect of the   hiatal hernia versus ingested material (2/81)     CHEST WALL AND LOWER NECK: Right-sided Mediport     ABDOMEN     LIVER/BILIARY TREE: Unremarkable.     GALLBLADDER: Post cholecystectomy.     SPLEEN: Unremarkable.     PANCREAS: Unremarkable.     ADRENAL GLANDS: Unremarkable.     KIDNEYS/URETERS: Unremarkable. No hydronephrosis.     STOMACH AND BOWEL: Known rectal neoplasm cannot be assessed on this examination. There is no bowel obstruction. Orally administered contrast material reaches the rectum..     APPENDIX: Normal.     ABDOMINOPELVIC CAVITY: No ascites. No pneumoperitoneum. No lymphadenopathy.     VESSELS: Atherosclerosis without abdominal aortic aneurysm.     PELVIS     REPRODUCTIVE ORGANS: Unremarkable for patient's age.     URINARY BLADDER: Under distended, limiting evaluation. Mild wall thickening of the under distended urinary bladder, favored to represent posttreatment changes     ABDOMINAL WALL/INGUINAL REGIONS: Unremarkable.     BONES: No acute fracture or suspicious osseous lesion. Postradiation changes in the sacrum.     IMPRESSION:     No metastatic lesions in the chest, abdomen and pelvis     Asymmetric wall thickening along the lateral aspect of the herniated stomach with apparent shouldering (2/92), which can represent prominent gastric wall rugae, adherent ingested material or gastric wall neoplasm. Potential nodular soft tissue component   versus adherent ingested material in the superior  aspect of the hernia as well. Endoscopic assessment is recommended for further differentiation     Mild thickening of the underdistended urinary bladder wall, likely representing postradiation changes. Clinical correlation is recommended to exclude possibility of acute cystitis.     The study was marked in EPIC for significant notification.              Workstation performed: [id number]        Imaging    CT chest abdomen pelvis w contrast (Order: [id number]) - 3/14/2024  Pathology, and Other Studies: I have personally reviewed pertinent reports.    See above    Assessment and Plan:  See diagnoses, orders instructions below    Patient is here with his wife.  This is continuation of care for rectal cancer.  In August 2023 patient was diagnosed to have low rectal adenocarcinoma.  Patient received  total neoadjuvant therapy, FOLFOX followed by 5-FU infusion and concurrent radiation.  Patient had  CT scans, MRI pelvis, sigmoidoscopy and EGD and there was no documented malignancy.  Patient's rectal surgeon gave him the options of no surgery versus surgery and he opted not to have surgery.  He will have blood counts, chemistry and CEA once every 12 weeks and so and visits after that.   There is some problem with his Port-A-Cath and he is being referred to IR to get that checked.  Patient has manageable  diarrhea and uses Imodium.  No other GI symptoms.  No rectal pain.  No rectal bleeding.  No weight loss.  Good appetite.  He feels tired.  He does not get good night sleep.     A. Colon, random colon biopsy:  - Fragments of colonic mucosa with increased intraepithelial lymphocytes and focal surface epithelial damage.  - There is no increase in subepithelial collagen thickness. See note.     Note (A): The above morphologic features may be compatible with lymphocytic (microscopic) colitis in conjunction with appropriate clinical and endoscopic findings.       B. Large Intestine, Transverse Colon, hot snare transverse colon  polyp:  - Tubular adenoma, fragments.  - Negative for high grade dysplasia/ carcinoma.     C. Large Intestine, Left/Descending Colon, hot snare descending colon polyp:  - Benign colonic mucosa without significant microscopic abnormality.     D. Large Intestine, Sigmoid Colon, hot snare mid sigmoid polyp:  - Tubular adenoma.  - Negative for high grade dysplasia/ carcinoma.     E. Large Intestine, Sigmoid Colon, distal sigmoid polyps- hot snare  x1 / cold snare  2:  - Tubular adenoma x 2, negative for high grade dysplasia/ carcinoma.  - Hyperplastic polyp, negative for dysplasia/ carcinoma.      F. Rectum, biopsy rectal mass:  - Adenocarcinoma, superficial fragments. See note.  - MMR panel is pending.     Note (F): This case was reviewed at the intradepartmental  conference.   Dr. Quinones is notified of the diagnosis in [employer] via [employer] on 08/25/2023  at 1.30 pm.   Electronically signed by Griffin Stevenson MD on 8/25/2023 at  1:41 PM     .Antibody          Clone               Description                           Results  MLH1               M1                   Mismatch repair protein       Intact nuclear expression  MSH2              [id number]       Mismatch repair protein       Intact nuclear expression  MSH6              44                     Mismatch repair protein       Intact nuclear expression  PMS2              [id number]           Mismatch repair protein       Intact nuclear expression       Physical examination and test results are as recorded and discussed.  Patient  completed treatments  for rectal cancer.  He opted not to have surgery.  Plan is surveillance.  Referral to IR for nonfunctioning Port-A-Cath.    Diet and activities as tolerated. Health screening tests.  Patient is capable of self-care.  All discussed in very much detail.  Questions answered.    See diagnoses, orders and instructions below.    1. Rectal adenocarcinoma (HCC)    - Ambulatory Referral to Interventional  Radiology; Future  - CT chest abdomen pelvis w contrast; Future  - CBC and differential; Future  - Comprehensive metabolic panel; Future  - CEA; Future  - CBC and differential  - Comprehensive metabolic panel  - CEA    2. Regional lymph node metastasis present (HCC)    - CT chest abdomen pelvis w contrast; Future  - CBC and differential; Future  - Comprehensive metabolic panel; Future  - CEA; Future  - CBC and differential  - Comprehensive metabolic panel  - CEA    3. Abnormal tumor markers      4. Port-A-Cath in place    - Ambulatory Referral to Interventional Radiology; Future    5. Dyslipidemia      6. Insomnia, unspecified type      7. History of diabetes mellitus      8. Chemotherapy induced diarrhea      9. Tired      Blood work prior to CT scan in October 2024.  Patient to be evaluated by IR as soon as possible for nonfunctional Port-A-Cath.  I did contact IR physician.  Follow-up in 3 months.      Disclaimer: This document was prepared using dictation device.  If a word or phrase is confusing, or does not make sense, this is likely due to recognition error which was not discovered during the providers review. If you believe an error has occurred, please Contact me through [facility]OPE Line service for tiara?cation.    Counseling / Coordination of Care  ..  Provided counseling and support

## 2024-08-26 NOTE — PATIENT INSTRUCTIONS
Blood work prior to CT scan in October 2024.  Patient to be evaluated by IR as soon as possible for nonfunctional Port-A-Cath.  I did contact IR physician.  Follow-up in 3 months.

## 2024-08-28 ENCOUNTER — HOSPITAL ENCOUNTER (OUTPATIENT)
Dept: RADIOLOGY | Facility: HOSPITAL | Age: 59
Discharge: HOME | End: 2024-08-28
Attending: RADIOLOGY | Admitting: RADIOLOGY
Payer: COMMERCIAL

## 2024-08-28 VITALS — HEART RATE: 76 BPM | OXYGEN SATURATION: 98 % | DIASTOLIC BLOOD PRESSURE: 83 MMHG | SYSTOLIC BLOOD PRESSURE: 136 MMHG

## 2024-08-28 DIAGNOSIS — Z95.828 PORT-A-CATH IN PLACE: ICD-10-CM

## 2024-08-28 PROCEDURE — 36590 REMOVAL TUNNELED CV CATH: CPT

## 2024-08-28 PROCEDURE — 36590 REMOVAL TUNNELED CV CATH: CPT | Performed by: RADIOLOGY

## 2024-08-28 PROCEDURE — 36598 INJ W/FLUOR EVAL CV DEVICE: CPT

## 2024-08-28 RX ADMIN — Medication 10 ML: at 11:18

## 2024-08-28 NOTE — DISCHARGE INSTRUCTIONS
Implanted Venous Access Port Removal    WHAT YOU NEED TO KNOW:   An implanted venous access port is a device used to give treatments and take blood. It may also be called a central venous access device (CVAD). The port is a small container that is placed under your skin, usually in your upper chest. A port can also be placed in your arm or abdomen. The port is attached to a catheter that enters a large vein.     DISCHARGE INSTRUCTIONS:   Resume your normal diet. Small sips of flat soda will help with mild nausea.    Prevent an infection:     Wash your hands often.  Use soap and water. Clean your hands before and  after you care for your incision.    Check your skin for infection every day.  Look for redness, swelling, or fluid oozing from the incision site. Dressing may come off in 24 hours. Medical glue will peel off on its own in 5 to 10 days. You may shower 24 hours after procedure.     Follow up with your healthcare provider as directed  Write down your questions so you remember to ask them during your visits.     Activity:  You may return to your daily activities when the area heals.     Contact Interventional Radiology at [phone] ([facility] PATIENTS: Contact Interventional Radiology at [phone]) ([facility] PATIENTS: Contact Interventional Radiology at [phone]) if:     You have a fever.     You have persistent nausea.    Your inciscion site is red, swollen, or draining pus.    You have questions or concerns about your condition or care.    Seek care immediately or call 911 if:  Blood soaks through your bandage.    The skin over or around your incision breaks open.    Your heart is jumping or fluttering.    You have a headache, blurred vision, and feel confused.    You have pain in your arm, neck, shoulder, or chest.    You have trouble breathing that is getting worse over time.

## 2024-08-28 NOTE — BRIEF OP NOTE (RAD/CATH)
INTERVENTIONAL RADIOLOGY PROCEDURE NOTE    Date: 8/28/2024    Procedure:   Procedure Summary       Date: 08/28/24 Room / Location: [facility] Interventional Radiology    Anesthesia Start:  Anesthesia Stop:     Procedure: IR PORT REMOVAL Diagnosis:       Port-A-Cath in place      (not working)    Scheduled Providers:  Responsible Provider:     Anesthesia Type: Not recorded ASA Status: Not recorded            Preoperative diagnosis:   1. Port-A-Cath in place         Postoperative diagnosis: Same.    Surgeon: Shannan Xiao MD     Assistant: None. No qualified resident was available.    Blood loss: 0    Specimens: 0     Findings:     R chest port removal    Complications: None immediate.    Anesthesia: local

## 2024-08-28 NOTE — QUICK NOTE
Patient here for port check    He has a fibrin sheath    He requests that the port be removed    On discussion port is not being used for any active cancer therapy and only for blood draws    I confirmed with Dr. Carrillo that port is no longer required    Patient is here with no a PU support or IV access but requests removal    I discussed her usual technique for removal but he is comfortable with proceeding with local only and I am as well    We will remove the port     risks benefits alternatives reviewed informed consent obtained

## 2024-08-28 NOTE — SEDATION DOCUMENTATION
Port check and removal completed by Dr. Xiao. Exofin and sutures to site. Patient tolerated well. Education provided to patient and wife.

## 2024-09-20 ENCOUNTER — HOSPITAL ENCOUNTER (OUTPATIENT)
Dept: GASTROENTEROLOGY | Facility: HOSPITAL | Age: 59
Setting detail: OUTPATIENT SURGERY
End: 2024-09-20
Attending: COLON & RECTAL SURGERY
Payer: COMMERCIAL

## 2024-09-20 ENCOUNTER — ANESTHESIA (OUTPATIENT)
Dept: GASTROENTEROLOGY | Facility: HOSPITAL | Age: 59
End: 2024-09-20
Payer: COMMERCIAL

## 2024-09-20 ENCOUNTER — ANESTHESIA EVENT (OUTPATIENT)
Dept: GASTROENTEROLOGY | Facility: HOSPITAL | Age: 59
End: 2024-09-20
Payer: COMMERCIAL

## 2024-09-20 VITALS
HEART RATE: 68 BPM | DIASTOLIC BLOOD PRESSURE: 70 MMHG | RESPIRATION RATE: 20 BRPM | SYSTOLIC BLOOD PRESSURE: 109 MMHG | TEMPERATURE: 97.8 F | OXYGEN SATURATION: 94 %

## 2024-09-20 DIAGNOSIS — C20 RECTAL ADENOCARCINOMA (HCC): ICD-10-CM

## 2024-09-20 PROCEDURE — 88305 TISSUE EXAM BY PATHOLOGIST: CPT | Performed by: PATHOLOGY

## 2024-09-20 PROCEDURE — 45385 COLONOSCOPY W/LESION REMOVAL: CPT | Performed by: COLON & RECTAL SURGERY

## 2024-09-20 RX ORDER — SODIUM CHLORIDE 9 MG/ML
INJECTION, SOLUTION INTRAVENOUS CONTINUOUS PRN
Status: DISCONTINUED | OUTPATIENT
Start: 2024-09-20 | End: 2024-09-20

## 2024-09-20 RX ORDER — LIDOCAINE HYDROCHLORIDE 10 MG/ML
INJECTION, SOLUTION EPIDURAL; INFILTRATION; INTRACAUDAL; PERINEURAL AS NEEDED
Status: DISCONTINUED | OUTPATIENT
Start: 2024-09-20 | End: 2024-09-20

## 2024-09-20 RX ORDER — PROPOFOL 10 MG/ML
INJECTION, EMULSION INTRAVENOUS AS NEEDED
Status: DISCONTINUED | OUTPATIENT
Start: 2024-09-20 | End: 2024-09-20

## 2024-09-20 RX ADMIN — PROPOFOL 50 MG: 10 INJECTION, EMULSION INTRAVENOUS at 09:29

## 2024-09-20 RX ADMIN — PROPOFOL 50 MG: 10 INJECTION, EMULSION INTRAVENOUS at 09:20

## 2024-09-20 RX ADMIN — PROPOFOL 50 MG: 10 INJECTION, EMULSION INTRAVENOUS at 09:17

## 2024-09-20 RX ADMIN — SODIUM CHLORIDE: 0.9 INJECTION, SOLUTION INTRAVENOUS at 09:09

## 2024-09-20 RX ADMIN — Medication 40 MG: at 09:27

## 2024-09-20 RX ADMIN — PROPOFOL 50 MG: 10 INJECTION, EMULSION INTRAVENOUS at 09:18

## 2024-09-20 RX ADMIN — PROPOFOL 100 MG: 10 INJECTION, EMULSION INTRAVENOUS at 09:14

## 2024-09-20 RX ADMIN — PROPOFOL 50 MG: 10 INJECTION, EMULSION INTRAVENOUS at 09:23

## 2024-09-20 RX ADMIN — PROPOFOL 50 MG: 10 INJECTION, EMULSION INTRAVENOUS at 09:33

## 2024-09-20 RX ADMIN — LIDOCAINE HYDROCHLORIDE 50 MG: 10 INJECTION, SOLUTION EPIDURAL; INFILTRATION; INTRACAUDAL; PERINEURAL at 09:14

## 2024-09-20 RX ADMIN — PROPOFOL 50 MG: 10 INJECTION, EMULSION INTRAVENOUS at 09:26

## 2024-09-20 RX ADMIN — PROPOFOL 50 MG: 10 INJECTION, EMULSION INTRAVENOUS at 09:36

## 2024-09-20 RX ADMIN — PROPOFOL 50 MG: 10 INJECTION, EMULSION INTRAVENOUS at 09:25

## 2024-09-20 NOTE — INTERVAL H&P NOTE
8/2023 last colonoscopy, history of rectal adenocarcinoma, good response post chemoradiation and elected to have nonoperative management.  Screening colonoscopy,discussed in a face-to-face, personal, informed consent process, the benefits, alternatives, risks including not limited to bleeding, missed lesion, perforation requiring emergent surgery discussed/understood.    H&P reviewed. After examining the patient I find no changes in the patients condition since the H&P had been written.    Vitals:    09/20/24 0843   BP: 135/71   Pulse: 69   Resp: 18   Temp: 98.1 °F (36.7 °C)   SpO2: 97%

## 2024-09-20 NOTE — ANESTHESIA PREPROCEDURE EVALUATION
Procedure:  COLONOSCOPY    Relevant Problems   CARDIO   (+) HTN (hypertension)      GI/HEPATIC   (+) Rectal adenocarcinoma (HCC)        Physical Exam    Airway    Mallampati score: III  TM Distance: >3 FB  Neck ROM: full     Dental   No notable dental hx     Cardiovascular  Cardiovascular exam normal    Pulmonary  Pulmonary exam normal     Other Findings        Anesthesia Plan  ASA Score- 2     Anesthesia Type- IV sedation with anesthesia with ASA Monitors.         Additional Monitors:     Airway Plan:            Plan Factors-Exercise tolerance (METS): >4 METS.    Chart reviewed.    Patient summary reviewed.    Patient is not a current smoker.              Induction- intravenous.    Postoperative Plan-         Informed Consent- Anesthetic plan and risks discussed with patient.

## 2024-09-20 NOTE — ANESTHESIA POSTPROCEDURE EVALUATION
Post-Op Assessment Note    CV Status:  Stable    Pain management: satisfactory to patient       Mental Status:  Awake, sleepy and arousable   Hydration Status:  Euvolemic   PONV Controlled:  Controlled   Airway Patency:  Patent     Post Op Vitals Reviewed: Yes    No anethesia notable event occurred.    Staff: CRNA               BP      Temp      Pulse     Resp      SpO2

## 2024-09-23 ENCOUNTER — TELEPHONE (OUTPATIENT)
Age: 59
End: 2024-09-23

## 2024-09-24 PROCEDURE — 88305 TISSUE EXAM BY PATHOLOGIST: CPT | Performed by: PATHOLOGY

## 2024-09-25 PROBLEM — T45.1X5A CHEMOTHERAPY INDUCED DIARRHEA: Status: RESOLVED | Noted: 2024-08-26 | Resolved: 2024-09-25

## 2024-09-25 PROBLEM — K52.1 CHEMOTHERAPY INDUCED DIARRHEA: Status: RESOLVED | Noted: 2024-08-26 | Resolved: 2024-09-25

## 2024-10-09 ENCOUNTER — HOSPITAL ENCOUNTER (OUTPATIENT)
Dept: CT IMAGING | Facility: HOSPITAL | Age: 59
Discharge: HOME | End: 2024-10-09
Attending: INTERNAL MEDICINE
Payer: COMMERCIAL

## 2024-10-09 DIAGNOSIS — C77.9 REGIONAL LYMPH NODE METASTASIS PRESENT (HCC): ICD-10-CM

## 2024-10-09 DIAGNOSIS — C20 RECTAL ADENOCARCINOMA (HCC): ICD-10-CM

## 2024-10-09 PROCEDURE — 74177 CT ABD & PELVIS W/CONTRAST: CPT

## 2024-10-09 PROCEDURE — 71260 CT THORAX DX C+: CPT

## 2024-10-09 RX ADMIN — IOHEXOL 75 ML: 350 INJECTION, SOLUTION INTRAVENOUS at 12:54

## 2024-12-09 NOTE — PROGRESS NOTES
Colon and Rectal Surgery   Remi Olivares 59 y.o. male MRN: [id number]   Encounter: [id number]  12/09/24   9:34 AM        ASSESSMENT:        PLAN:        HPI  Remi Olivares is a 59 y.o. male who presents for a follow up for rectal adenocarcinoma. He was last seen in the office on 3/29/24.    Status post neoadjuvantchemoradiation; completed on 2/26/24.    Last MRI pelvis without contrast (rectal cancer restaging) 4/4/24  IMPRESSION:  - Previously seen low rectal cancer has markedly shrunken with only markedly T2 hypointense tissue consistent with scar remaining (series 3 images 19-26 and series 2 images 26-36.)  - Previously, there was tumor involvement of the posterior half of the upper internal sphincter. This has now resolved, with 0.8 cm distance from treated tumor to the top of the anal sphincter (series 9 image 28.)  - Previously, there was tumor invasion of the prostate apex. Currently, only scarlike tissue abuts the prostate apex (series 2 images 20 and that 32.)  - SINCE 4/4/2024, POST TREATMENT PRIMARY TUMOR ASSESSMENT: Complete/near complete response (please note this imaging appearance does not rule out small volume residual viable tumor).    Status post IR port removal on 8/28/24.    The patient had a colonoscopy on 9/20/24 with a 1 year recall.   IMPRESSION:  - Three polyps measuring 5-9 mm in the ascending colon and hepatic flexure; performed cold snare with piecemeal removal; placed 1 clip successfully  - Abnormal mucosa  Final Diagnosis:  A. Polyp, hepatic flexure - cold snare:  -   Fragments of tubular adenoma.  -   Negative for high grade dysplasia and malignancy.    B. Polyp, ascending colon polyp x2 - cold snare:  -   Fragments of tubular adenomas.  -   Negative for high grade dysplasia and malignancy.      CT chest abdomen pelvis 10/9/24  IMPRESSION:  - No evidence for recurrent or metastatic disease.  - 9 x 7 mm right lower lobe groundglass nodule demonstrating 13 months of stability. Based on  current Fleischner Society 2017 Guidelines on incidental pulmonary nodule, follow-up noncontrast CT is recommended at 6-12 months from the initial examination to confirm persistence; if stable at that time, additional follow-up CT is recommended for every 2 years until 5 years of stability is demonstrated.  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    Lab Results   Component Value Date    CEA 1.5 08/23/2024     Lab Results   Component Value Date    WBC 4.31 08/23/2024    HGB 15.0 08/23/2024    HCT 44.5 08/23/2024    MCV 97 08/23/2024     08/23/2024     Lab Results   Component Value Date    SODIUM 138 08/23/2024    K 4.6 08/23/2024     08/23/2024    CO2 27 08/23/2024    AGAP 5 08/23/2024    BUN 16 08/23/2024    CREATININE 0.70 08/23/2024    GLUC 187 (H) 08/23/2024    CALCIUM 9.4 08/23/2024    AST 17 08/23/2024    ALT 28 08/23/2024    ALKPHOS 66 08/23/2024    TP 7.0 08/23/2024    TBILI 0.78 08/23/2024    EGFR 104 08/23/2024       Historical Information   Past Medical History:   Diagnosis Date    Cancer (HCC)     rectal gone with treatment    Diabetes mellitus (HCC)     Hyperlipidemia      Past Surgical History:   Procedure Laterality Date    COLONOSCOPY      GALLBLADDER SURGERY  2013    IR PORT PLACEMENT  09/18/2023    IR PORT REMOVAL  8/28/2024    TONSILLECTOMY      UMBILICAL HERNIA REPAIR  2013       Meds/Allergies       Current Outpatient Medications:     buPROPion (WELLBUTRIN XL) 300 mg 24 hr tablet, Take 300 mg by mouth daily, Disp: , Rfl:     escitalopram (LEXAPRO) 20 mg tablet, Take 20 mg by mouth daily, Disp: , Rfl:     ibuprofen (MOTRIN) 200 mg tablet, Take 600 mg by mouth every 6 (six) hours as needed for headaches, Disp: , Rfl:     Jardiance 10 MG TABS tablet, Take 10 mg by mouth daily, Disp: , Rfl:     lidocaine-prilocaine (EMLA) cream, Apply topically as needed for mild pain (Patient not taking: Reported on 7/17/2024), Disp: 1 g, Rfl: 1    lisinopril  (ZESTRIL) 5 mg tablet, Take 5 mg by mouth daily, Disp: , Rfl:     metFORMIN (GLUCOPHAGE) 1000 MG tablet, Take 1,000 mg by mouth 2 (two) times a day, Disp: , Rfl:     ondansetron (ZOFRAN) 8 mg tablet, Take one tablet by mouth every 8 hours an needed for nausea (Patient not taking: Reported on 2024), Disp: 20 tablet, Rfl: 0    prochlorperazine (COMPAZINE) 10 mg tablet, Take 1 tablet (10 mg total) by mouth every 6 (six) hours as needed for nausea or vomiting (Patient not taking: Reported on 2024), Disp: 30 tablet, Rfl: 0    rosuvastatin (CRESTOR) 20 MG tablet, Take 20 mg by mouth daily, Disp: , Rfl:     traMADol (Ultram) 50 mg tablet, Take 1 tablet (50 mg total) by mouth every 4 (four) hours as needed for moderate pain (Patient not taking: Reported on 2024), Disp: 120 tablet, Rfl: 0      Allergies   Allergen Reactions    Atorvastatin Myalgia         Social History   Social History     Substance and Sexual Activity   Alcohol Use Never     Social History     Substance and Sexual Activity   Drug Use Never     Social History     Tobacco Use   Smoking Status Former    Current packs/day: 0.00    Average packs/day: 2.0 packs/day for 30.0 years (60.0 ttl pk-yrs)    Types: Cigarettes    Start date:     Quit date:     Years since quittin.9    Passive exposure: Past   Smokeless Tobacco Never         Family History:   Family History   Problem Relation Age of Onset    No Known Problems Mother     No Known Problems Father        Review of Systems    Objective     Current Vitals:   There were no vitals filed for this visit.      Physical Exam:  General:no distress  Eyes:perrla/eomi  ENT:moist mucus membranes  Neck:supple  Pulm:no increased work of breathing  CV:sinus  Abdomen:soft,nontender  Rectal:normal perianal skin/sphincter tone, no masses palpated  Extremities:no edema  Lymphatics:no neck/axillary/groin lymphadenopathy

## 2024-12-10 ENCOUNTER — PROCEDURE VISIT (OUTPATIENT)
Age: 59
End: 2024-12-10
Payer: COMMERCIAL

## 2024-12-10 VITALS
HEIGHT: 72 IN | DIASTOLIC BLOOD PRESSURE: 60 MMHG | WEIGHT: 207.8 LBS | SYSTOLIC BLOOD PRESSURE: 128 MMHG | BODY MASS INDEX: 28.15 KG/M2

## 2024-12-10 DIAGNOSIS — C20 RECTAL ADENOCARCINOMA (HCC): Primary | ICD-10-CM

## 2024-12-10 PROCEDURE — 45300 PROCTOSIGMOIDOSCOPY DX: CPT | Performed by: COLON & RECTAL SURGERY

## 2024-12-10 RX ORDER — EMPAGLIFLOZIN 25 MG/1
25 TABLET, FILM COATED ORAL DAILY
COMMUNITY
Start: 2024-10-21

## 2024-12-10 NOTE — PROGRESS NOTES
Colon and Rectal Surgery   Remi Olivares 59 y.o. male MRN: [id number]   Encounter: [id number]  12/10/24   10:48 AM      Remi Olivares is a 59 y.o. male who presents for a follow up for rectal adenocarcinoma. He was last seen in the office on 3/29/24.    Pt reports he is doing well, no complaints    Patient has 1-6 bowel movement a day (really varies). Stool Consistency loose    Status post neoadjuvantchemoradiation; completed on 2/26/24.    Last MRI pelvis without contrast (rectal cancer restaging) 4/4/24  IMPRESSION:  - Previously seen low rectal cancer has markedly shrunken with only markedly T2 hypointense tissue consistent with scar remaining (series 3 images 19-26 and series 2 images 26-36.)  - Previously, there was tumor involvement of the posterior half of the upper internal sphincter. This has now resolved, with 0.8 cm distance from treated tumor to the top of the anal sphincter (series 9 image 28.)  - Previously, there was tumor invasion of the prostate apex. Currently, only scarlike tissue abuts the prostate apex (series 2 images 20 and that 32.)  - SINCE 4/4/2024, POST TREATMENT PRIMARY TUMOR ASSESSMENT: Complete/near complete response (please note this imaging appearance does not rule out small volume residual viable tumor).    Status post IR port removal on 8/28/24.    The patient had a colonoscopy on 9/20/24 with a 1 year recall.   IMPRESSION:  - Three polyps measuring 5-9 mm in the ascending colon and hepatic flexure; performed cold snare with piecemeal removal; placed 1 clip successfully  - Abnormal mucosa  Final Diagnosis:  A. Polyp, hepatic flexure - cold snare:  -   Fragments of tubular adenoma.  -   Negative for high grade dysplasia and malignancy.    B. Polyp, ascending colon polyp x2 - cold snare:  -   Fragments of tubular adenomas.  -   Negative for high grade dysplasia and malignancy.      CT chest abdomen pelvis 10/9/24  IMPRESSION:  - No evidence for recurrent or metastatic disease.  - 9 x 7  mm right lower lobe groundglass nodule demonstrating 13 months of stability. Based on current Fleischner Society 2017 Guidelines on incidental pulmonary nodule, follow-up noncontrast CT is recommended at 6-12 months from the initial examination to confirm persistence; if stable at that time, additional follow-up CT is recommended for every 2 years until 5 years of stability is demonstrated.  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    Lab Results   Component Value Date    CEA 1.5 08/23/2024     Lab Results   Component Value Date    WBC 4.31 08/23/2024    HGB 15.0 08/23/2024    HCT 44.5 08/23/2024    MCV 97 08/23/2024     08/23/2024     Lab Results   Component Value Date    SODIUM 138 08/23/2024    K 4.6 08/23/2024     08/23/2024    CO2 27 08/23/2024    AGAP 5 08/23/2024    BUN 16 08/23/2024    CREATININE 0.70 08/23/2024    GLUC 187 (H) 08/23/2024    CALCIUM 9.4 08/23/2024    AST 17 08/23/2024    ALT 28 08/23/2024    ALKPHOS 66 08/23/2024    TP 7.0 08/23/2024    TBILI 0.78 08/23/2024    EGFR 104 08/23/2024       Historical Information   Past Medical History:   Diagnosis Date   • Cancer (HCC)     rectal gone with treatment   • Diabetes mellitus (HCC)    • Hyperlipidemia      Past Surgical History:   Procedure Laterality Date   • COLONOSCOPY     • GALLBLADDER SURGERY  2013   • IR PORT PLACEMENT  09/18/2023   • IR PORT REMOVAL  8/28/2024   • TONSILLECTOMY     • UMBILICAL HERNIA REPAIR  2013       Meds/Allergies       Current Outpatient Medications:   •  buPROPion (WELLBUTRIN XL) 300 mg 24 hr tablet, Take 300 mg by mouth daily, Disp: , Rfl:   •  escitalopram (LEXAPRO) 20 mg tablet, Take 20 mg by mouth daily, Disp: , Rfl:   •  Jardiance 25 MG TABS, Take 25 mg by mouth daily, Disp: , Rfl:   •  lisinopril (ZESTRIL) 5 mg tablet, Take 5 mg by mouth daily, Disp: , Rfl:   •  rosuvastatin (CRESTOR) 20 MG tablet, Take 20 mg by mouth daily, Disp: , Rfl:   •  ibuprofen (MOTRIN)  200 mg tablet, Take 600 mg by mouth every 6 (six) hours as needed for headaches (Patient not taking: Reported on 12/10/2024), Disp: , Rfl:   •  Jardiance 10 MG TABS tablet, Take 10 mg by mouth daily (Patient not taking: Reported on 12/10/2024), Disp: , Rfl:   •  lidocaine-prilocaine (EMLA) cream, Apply topically as needed for mild pain (Patient not taking: Reported on 2024), Disp: 1 g, Rfl: 1  •  metFORMIN (GLUCOPHAGE) 1000 MG tablet, Take 1,000 mg by mouth 2 (two) times a day, Disp: , Rfl:   •  ondansetron (ZOFRAN) 8 mg tablet, Take one tablet by mouth every 8 hours an needed for nausea (Patient not taking: Reported on 2024), Disp: 20 tablet, Rfl: 0  •  prochlorperazine (COMPAZINE) 10 mg tablet, Take 1 tablet (10 mg total) by mouth every 6 (six) hours as needed for nausea or vomiting (Patient not taking: Reported on 2024), Disp: 30 tablet, Rfl: 0  •  traMADol (Ultram) 50 mg tablet, Take 1 tablet (50 mg total) by mouth every 4 (four) hours as needed for moderate pain (Patient not taking: Reported on 2024), Disp: 120 tablet, Rfl: 0      Allergies   Allergen Reactions   • Atorvastatin Myalgia         Social History   Social History     Substance and Sexual Activity   Alcohol Use Never     Social History     Substance and Sexual Activity   Drug Use Never     Social History     Tobacco Use   Smoking Status Former   • Current packs/day: 0.00   • Average packs/day: 2.0 packs/day for 30.0 years (60.0 ttl pk-yrs)   • Types: Cigarettes   • Start date:    • Quit date:    • Years since quittin.9   • Passive exposure: Past   Smokeless Tobacco Never         Family History:   Family History   Problem Relation Age of Onset   • No Known Problems Mother    • No Known Problems Father      Objective   Current Vitals:   Vitals:    12/10/24 0935   BP: 128/60   BP Location: Left arm   Patient Position: Sitting   Cuff Size: Adult   Weight: 94.3 kg (207 lb 12.8 oz)   Height: 6' (1.829 m)     Physical  Exam:  General:no distress  Neck:supple  Pulm:no increased work of breathing  CV:sinus  Abdomen:soft,nontender  Rectal:normal perianal skin/sphincter tone, no masses palpated  Extremities:no edema  Lymphatics:no neck/axillary/groin lymphadenopathy    Rigid proctosigmoidoscopy    Date/Time: 12/10/2024 9:40 AM    Performed by: Eleazar Aguilar MD  Authorized by: Eleazar Aguilar MD    Verbal consent obtained?: Yes    Consent given by:  Patient  Patient identity confirmed:  Verbally with patient  Indications comment:  History rectal adenocarcinoma, complete clinical response after chemoradiation  Scope type:  Rigid sigmoidoscope  Distance inserted (cm):  15  Prep quality:  Good  Withdraw and retroflex quality:  Good   Scarred mucosa in the distal rectum. Healthy anterior midline postradiation scar with complete response, prior rectal cancer site

## 2024-12-10 NOTE — ASSESSMENT & PLAN NOTE
ASSESSMENT:    Remi returns today, 9months out from completion of chemoradiation therapy 2/26/24 neoadjuvant, for low/distal rectal cancer extending to the dentate line.    Rigid proctosigmoidoscopy today:  Scarred mucosa in the distal rectum. Healthy anterior midline postradiation scar with complete response, prior rectal cancer site.     PLAN:  -3months rigid proctosigmoidoscopy office  -Continue oncology surveillance, he will discuss next week re: 4mm SVC thrombus  CC:  DO Dr. Gerardo Lantigua Dr.

## 2024-12-11 LAB
ALBUMIN SERPL-MCNC: 4.3 G/DL (ref 3.8–4.9)
ALP SERPL-CCNC: 75 IU/L (ref 44–121)
ALT SERPL-CCNC: 29 IU/L (ref 0–44)
AST SERPL-CCNC: 20 IU/L (ref 0–40)
BASOPHILS # BLD AUTO: 0 X10E3/UL (ref 0–0.2)
BASOPHILS NFR BLD AUTO: 1 %
BILIRUB SERPL-MCNC: 0.7 MG/DL (ref 0–1.2)
BUN SERPL-MCNC: 18 MG/DL (ref 6–24)
BUN/CREAT SERPL: 18 (ref 9–20)
CALCIUM SERPL-MCNC: 9.7 MG/DL (ref 8.7–10.2)
CEA SERPL-MCNC: 1.7 NG/ML (ref 0–4.7)
CHLORIDE SERPL-SCNC: 104 MMOL/L (ref 96–106)
CO2 SERPL-SCNC: 19 MMOL/L (ref 20–29)
CREAT SERPL-MCNC: 0.98 MG/DL (ref 0.76–1.27)
EGFR: 90 ML/MIN/1.73
EOSINOPHIL # BLD AUTO: 0.1 X10E3/UL (ref 0–0.4)
EOSINOPHIL NFR BLD AUTO: 2 %
ERYTHROCYTE [DISTWIDTH] IN BLOOD BY AUTOMATED COUNT: 12.7 % (ref 11.6–15.4)
GLOBULIN SER-MCNC: 2.3 G/DL (ref 1.5–4.5)
GLUCOSE SERPL-MCNC: 158 MG/DL (ref 70–99)
HCT VFR BLD AUTO: 49.4 % (ref 37.5–51)
HGB BLD-MCNC: 16.7 G/DL (ref 13–17.7)
IMM GRANULOCYTES # BLD: 0 X10E3/UL (ref 0–0.1)
IMM GRANULOCYTES NFR BLD: 0 %
LYMPHOCYTES # BLD AUTO: 0.8 X10E3/UL (ref 0.7–3.1)
LYMPHOCYTES NFR BLD AUTO: 16 %
MCH RBC QN AUTO: 33.4 PG (ref 26.6–33)
MCHC RBC AUTO-ENTMCNC: 33.8 G/DL (ref 31.5–35.7)
MCV RBC AUTO: 99 FL (ref 79–97)
MONOCYTES # BLD AUTO: 0.4 X10E3/UL (ref 0.1–0.9)
MONOCYTES NFR BLD AUTO: 8 %
NEUTROPHILS # BLD AUTO: 3.6 X10E3/UL (ref 1.4–7)
NEUTROPHILS NFR BLD AUTO: 73 %
PLATELET # BLD AUTO: 225 X10E3/UL (ref 150–450)
POTASSIUM SERPL-SCNC: 4.8 MMOL/L (ref 3.5–5.2)
PROT SERPL-MCNC: 6.6 G/DL (ref 6–8.5)
RBC # BLD AUTO: 5 X10E6/UL (ref 4.14–5.8)
SODIUM SERPL-SCNC: 140 MMOL/L (ref 134–144)
WBC # BLD AUTO: 4.9 X10E3/UL (ref 3.4–10.8)

## 2024-12-16 ENCOUNTER — OFFICE VISIT (OUTPATIENT)
Dept: HEMATOLOGY ONCOLOGY | Facility: CLINIC | Age: 59
End: 2024-12-16
Payer: COMMERCIAL

## 2024-12-16 VITALS
HEIGHT: 72 IN | RESPIRATION RATE: 18 BRPM | OXYGEN SATURATION: 99 % | WEIGHT: 211 LBS | HEART RATE: 87 BPM | SYSTOLIC BLOOD PRESSURE: 110 MMHG | BODY MASS INDEX: 28.58 KG/M2 | TEMPERATURE: 97 F | DIASTOLIC BLOOD PRESSURE: 64 MMHG

## 2024-12-16 DIAGNOSIS — Z86.39 HISTORY OF HYPERLIPIDEMIA: ICD-10-CM

## 2024-12-16 DIAGNOSIS — C77.9 REGIONAL LYMPH NODE METASTASIS PRESENT (HCC): ICD-10-CM

## 2024-12-16 DIAGNOSIS — Z86.39 HISTORY OF DIABETES MELLITUS: ICD-10-CM

## 2024-12-16 DIAGNOSIS — I82.90 THROMBUS: ICD-10-CM

## 2024-12-16 DIAGNOSIS — C20 RECTAL ADENOCARCINOMA (HCC): Primary | ICD-10-CM

## 2024-12-16 DIAGNOSIS — Z86.79 HISTORY OF HYPERTENSION: ICD-10-CM

## 2024-12-16 PROCEDURE — 99214 OFFICE O/P EST MOD 30 MIN: CPT | Performed by: INTERNAL MEDICINE

## 2024-12-16 NOTE — ASSESSMENT & PLAN NOTE
Remi Olivares is a 59 y.o. male with medical history significant for hypertension, diabetes presented to the clinic for continuation of care for low rectal adenocarcinoma (Stage IIIC (cT4b, cN1, cM0)) in August 2023 s/p total neoadjuvant therapy, FOLFOX followed by 5-FU infusion and concurrent radiation.  Patient had  CT scans, MRI pelvis, sigmoidoscopy and EGD and there was no documented malignancy.  Patient's rectal surgeon gave him the options of no surgery versus surgery and he opted not to have surgery.  He will have blood counts, chemistry and CEA once every 12 weeks and so and visits after that.  Patient has manageable  diarrhea and uses Imodium.  No other GI symptoms.  No rectal pain.  No rectal bleeding.  No weight loss.  Good appetite.  He feels tired.  He does not get good night sleep.     Orders:    CT chest and abdomen w contrast; Future    MRI pelvis w wo contrast; Future    CBC and differential; Future    Comprehensive metabolic panel; Future    CEA; Future

## 2024-12-16 NOTE — PROGRESS NOTES
Name: Remi Olivares      : 1965      MRN: [id number]  Encounter Provider: Aristides Carrillo MD  Encounter Date: 2024   Encounter department: [facility] [place]  :  Assessment & Plan  Rectal adenocarcinoma (HCC)  Remi Olivares is a 59 y.o. male with medical history significant for hypertension, diabetes presented to the clinic for continuation of care for low rectal adenocarcinoma (Stage IIIC (cT4b, cN1, cM0)) in 2023 s/p total neoadjuvant therapy, FOLFOX followed by 5-FU infusion and concurrent radiation.  Patient had  CT scans, MRI pelvis, sigmoidoscopy and EGD and there was no documented malignancy.  Patient's rectal surgeon gave him the options of no surgery versus surgery and he opted not to have surgery.  He will have blood counts, chemistry and CEA once every 12 weeks and so and visits after that.  Patient has manageable  diarrhea and uses Imodium.  No other GI symptoms.  No rectal pain.  No rectal bleeding.  No weight loss.  Good appetite.  He feels tired.  He does not get good night sleep.     Orders:    CT chest and abdomen w contrast; Future    MRI pelvis w wo contrast; Future    CBC and differential; Future    Comprehensive metabolic panel; Future    CEA; Future    Thrombus  CT chest abdomen pelvis with contrast on 10/9/2024 indicates 4 mm thrombus in the mid superior vena cava likely related to the previous port.   We will order an ultrasound of the area and if clot persistent we will start patient on low-dose anticoagulation    Orders:    VAS upper limb venous duplex scan, unilateral/limited    Regional lymph node metastasis present (HCC)         Chemotherapy-induced nausea         Chemotherapy induced neutropenia (HCC)             History of Present Illness   Chief Complaint   Patient presents with    Follow-up       Pertinent Medical History   Remi Olivares is a 59 y.o. male with medical history significant for hypertension, diabetes presented to the  clinic for continuation of care for low rectal adenocarcinoma (Stage IIIC (cT4b, cN1, cM0)) in August 2023 s/p total neoadjuvant therapy, FOLFOX followed by 5-FU infusion and concurrent radiation.  Patient had  CT scans, MRI pelvis, sigmoidoscopy and EGD and there was no documented malignancy.  Patient's rectal surgeon gave him the options of no surgery versus surgery and he opted not to have surgery.  He will have blood counts, chemistry and CEA once every 12 weeks and so and visits after that.  Patient has manageable  diarrhea and uses Imodium.  No other GI symptoms.  No rectal pain.  No rectal bleeding.  No weight loss.  Good appetite.  He feels tired.  He does not get good night sleep.      Oncology History   Oncology History   Rectal adenocarcinoma (HCC)   8/22/2023 Initial Diagnosis    Rectal adenocarcinoma (HCC)     8/22/2023 Biopsy    Final Diagnosis  F. Rectum, biopsy rectal mass:  - Adenocarcinoma, superficial fragments. See note.  - MMR panel is pending.     9/14/2023 -  Cancer Staged    Staging form: Colon and Rectum, AJCC 8th Edition  - Clinical stage from 9/14/2023: Stage IIIC (cT4b, cN1, cM0) - Signed by Aristides Carrillo MD on 9/14/2023  Stage prefix: Initial diagnosis  Histologic grade (G): GX  Histologic grading system: 4 grade system       9/25/2023 - 1/8/2024 Chemotherapy    alteplase (CATHFLO), 2 mg, Intracatheter, Every 1 Minute as needed, 8 of 8 cycles  fluorouracil (ADRUCIL), 400 mg/m2 = 875 mg, Intravenous, Once, 8 of 8 cycles  Administration: 875 mg (9/25/2023), 875 mg (10/9/2023), 875 mg (10/23/2023), 875 mg (11/6/2023), 875 mg (11/20/2023), 875 mg (12/4/2023), 875 mg (12/18/2023), 875 mg (1/2/2024)  leucovorin calcium IVPB, 876 mg, Intravenous, Once, 8 of 8 cycles  Administration: 900 mg (9/25/2023), 900 mg (10/9/2023), 900 mg (10/23/2023), 900 mg (11/6/2023), 900 mg (11/20/2023), 900 mg (12/4/2023), 900 mg (12/18/2023), 900 mg (1/2/2024)  oxaliplatin (ELOXATIN) chemo infusion, 85 mg/m2 =  186.15 mg, Intravenous, Once, 8 of 8 cycles  Administration: 186.15 mg (9/25/2023), 186.15 mg (10/9/2023), 186.15 mg (10/23/2023), 186.15 mg (11/6/2023), 186.15 mg (11/20/2023), 186.15 mg (12/4/2023), 186.15 mg (12/18/2023), 186.15 mg (1/2/2024)  fluorouracil (ADRUCIL) ambulatory infusion Soln, 1,200 mg/m2/day = 5,255 mg, Intravenous, Over 46 hours, 8 of 8 cycles     1/15/2024 -  Chemotherapy    alteplase (CATHFLO), 2 mg, Intracatheter, Every 1 Minute as needed, 5 of 5 cycles  fluorouracil (ADRUCIL) ambulatory infusion Soln, 225 mg/m2/day = 2,440 mg, Intravenous, Over 120 hours, 5 of 5 cycles      Radiation    Plan ID Energy Fractions Dose per Fraction (cGy) Dose Correction (cGy) Total Dose Delivered (cGy) Elapsed Days   CD Pelvis RA 6X 3 / 3 180 0 540 4   Wh Pelvis RA 6X 25 / 25 180 0 4,500 35      Treatment Dates:  1/16/2024 - 2/26/2024.         Review of Systems    - GENERAL: Negative for any nausea, vomiting, fevers, chills, or weight loss.  - HEENT: Negative for any head/Neck trauma, pain, double/blurry vision, sinusitis, rhinitis, nose bleeding.  - CARDIAC: Negative for any chest pain, palpitation, Dyspnea on exertion, peripheral edema.  - PULMONARY: Negative for any SOB, cough, wheezing.   - GASTROINTESTINAL: chronic diahrea , no rectal bleeding  - GENITOURINARY: Negative for any dysuria, hematuria, incontinence.  - NEUROLOGIC: Negative for any muscle weakness, numbness/tingling, memory changes.    - MUSCULOSKELETAL: Negative for any joint pains/swelling, limited ROM.   - INTEGUMENTARY: Negative for any rashes, cuts/ lesions.        Objective   /64 (BP Location: Left arm, Patient Position: Sitting, Cuff Size: Large)   Pulse 87   Temp (!) 97 °F (36.1 °C) (Temporal)   Resp 18   Ht 6' (1.829 m)   Wt 95.7 kg (211 lb)   SpO2 99%   BMI 28.62 kg/m²     ECOG   0  Physical Exam    - GEN: Appears well, alert and oriented x 3, pleasant and cooperative, in no acute distress  - HEENT: Anicteric, mucous  membranes moist  - NECK: No lymphadenopathy, JVD or carotid bruits   - HEART: RRR, normal S1 and S2, no murmurs, clicks, gallops or rubs   - LUNGS: Clear to auscultation bilaterally; no wheezes, rales, or rhonchi  - ABDOMEN: Normal bowel sounds, soft, no tenderness, no distention, no organomegaly or masses felt on exam.   - EXTREMITIES: Peripheral pulses normal; no clubbing, cyanosis, or edema  - NEURO: No focal findings  - Musculoskeletal: 5/5 strength, normal ROM, no swollen or erythematous joints.   - SKIN: Normal without suspicious lesions on exposed skin     Labs: I have reviewed the following labs:  Lab Results   Component Value Date/Time    WBC 4.31 08/23/2024 11:45 AM    White Blood Cell Count 4.9 12/09/2024 07:15 AM    RBC 4.61 08/23/2024 11:45 AM    Red Blood Cell Count 5.00 12/09/2024 07:15 AM    Hemoglobin 16.7 12/09/2024 07:15 AM    Hemoglobin 15.0 08/23/2024 11:45 AM    Hematocrit 44.5 08/23/2024 11:45 AM    HCT 49.4 12/09/2024 07:15 AM    MCV 99 (H) 12/09/2024 07:15 AM    MCV 97 08/23/2024 11:45 AM    MCH 33.4 (H) 12/09/2024 07:15 AM    MCH 32.5 08/23/2024 11:45 AM    RDW 12.7 12/09/2024 07:15 AM    RDW 13.2 08/23/2024 11:45 AM    Platelet Count 225 12/09/2024 07:15 AM    Platelets 222 08/23/2024 11:45 AM    Segmented % 69 08/23/2024 11:45 AM    Neutrophils 73 12/09/2024 07:15 AM    Lymphocytes % 17 08/23/2024 11:45 AM    Lymphocytes 16 12/09/2024 07:15 AM    Monocytes % 10 08/23/2024 11:45 AM    Monocytes 8 12/09/2024 07:15 AM    Eosinophils Relative 2 08/23/2024 11:45 AM    Eosinophils 2 12/09/2024 07:15 AM    Basophils Relative 1 08/23/2024 11:45 AM    Immature Grans % 1 08/23/2024 11:45 AM    Absolute Neutrophils 3.05 08/23/2024 11:45 AM    Neutrophils (Absolute) 3.6 12/09/2024 07:15 AM     Lab Results   Component Value Date/Time    Potassium 4.8 12/09/2024 07:15 AM    Chloride 104 12/09/2024 07:15 AM    CO2 19 (L) 12/09/2024 07:15 AM    BUN 18 12/09/2024 07:15 AM    Creatinine 0.98 12/09/2024  07:15 AM    Creatinine 0.70 08/23/2024 11:45 AM    Calcium 9.4 08/23/2024 11:45 AM    AST 20 12/09/2024 07:15 AM    ALT 29 12/09/2024 07:15 AM    Alkaline Phosphatase 66 08/23/2024 11:45 AM    Protein, Total 6.6 12/09/2024 07:15 AM    Albumin 4.3 12/09/2024 07:15 AM    Albumin 4.4 08/23/2024 11:45 AM    TOTAL BILIRUBIN 0.7 12/09/2024 07:15 AM    eGFR 90 12/09/2024 07:15 AM    eGFR 104 08/23/2024 11:45 AM

## 2024-12-17 ENCOUNTER — HOSPITAL ENCOUNTER (OUTPATIENT)
Dept: NON INVASIVE DIAGNOSTICS | Facility: HOSPITAL | Age: 59
Discharge: HOME | End: 2024-12-17
Payer: COMMERCIAL

## 2024-12-17 PROCEDURE — 93971 EXTREMITY STUDY: CPT

## 2024-12-17 PROCEDURE — 93971 EXTREMITY STUDY: CPT | Performed by: SURGERY

## 2024-12-18 PROBLEM — Z86.79 HISTORY OF HYPERTENSION: Status: ACTIVE | Noted: 2024-12-18

## 2024-12-18 PROBLEM — I82.90 THROMBUS: Status: ACTIVE | Noted: 2024-12-18

## 2024-12-18 NOTE — ASSESSMENT & PLAN NOTE
CT chest abdomen pelvis with contrast on 10/9/2024 indicates 4 mm thrombus in the mid superior vena cava likely related to the previous port.   We will order an ultrasound of the area and if clot persistent we will start patient on low-dose anticoagulation    Orders:    VAS upper limb venous duplex scan, unilateral/limited

## 2025-01-27 ENCOUNTER — OFFICE VISIT (OUTPATIENT)
Facility: HOSPITAL | Age: 60
End: 2025-01-27
Attending: RADIOLOGY
Payer: COMMERCIAL

## 2025-01-27 VITALS
TEMPERATURE: 97.8 F | HEART RATE: 89 BPM | BODY MASS INDEX: 28.67 KG/M2 | SYSTOLIC BLOOD PRESSURE: 138 MMHG | OXYGEN SATURATION: 97 % | RESPIRATION RATE: 16 BRPM | WEIGHT: 211.4 LBS | DIASTOLIC BLOOD PRESSURE: 80 MMHG

## 2025-01-27 DIAGNOSIS — C20 RECTAL ADENOCARCINOMA (HCC): Primary | ICD-10-CM

## 2025-01-27 PROCEDURE — 99213 OFFICE O/P EST LOW 20 MIN: CPT | Performed by: RADIOLOGY

## 2025-01-27 PROCEDURE — 99211 OFF/OP EST MAY X REQ PHY/QHP: CPT | Performed by: RADIOLOGY

## 2025-01-27 NOTE — PROGRESS NOTES
Remi Olivares 1965 is a 59 y.o. 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 He completed initial FOLFOX and was referred for concurrent long course chemoradiation as a component of total neoadjuvant treatment. He completed a course of radiation therapy to the whole pelvis on 2/26/2024. He declined surgery. He was last seen in Radiation Oncology 7/17/24. He returns today for routine follow up.       8/26/24 Dr. Carrillo  Blood work and CT scan in October IR to evaluate Port   Follow up in 3 months    9/20/24 Colonoscopy  IMPRESSION:  Three polyps measuring 5-9 mm in the ascending colon and hepatic flexure; performed cold snare with piecemeal removal; placed 1 clip successfully  Abnormal mucosa           RECOMMENDATION:  -I will see him in 3 months office visit for rigid proctoscopy  Repeat colonoscopy in 1 year, due: 9/20/2025  Personal history of colon cancer  Personal history of colon polyps       10/9/24 CT C/A/P  IMPRESSION:  No evidence for recurrent or metastatic disease.  9 x 7 mm right lower lobe groundglass nodule demonstrating 13 months of stability. Based on current Fleischner Society 2017 Guidelines on incidental pulmonary nodule, follow-up noncontrast CT is recommended at 6-12 months from the initial examination to   confirm persistence; if stable at that time, additional follow-up CT is recommended for every 2 years until 5 years of stability is demonstrated.  4 mm thrombus in the mid superior vena cava likely related to the previous port.      12/10/24 Rigid proctosigmoidoscopy     Date/Time: 12/10/2024 9:40 AM     Performed by: Eleazar Aguilar MD  Authorized by: Eleazar Aguilar MD    Verbal consent obtained?: Yes    Consent given by:  Patient  Patient identity confirmed:  Verbally with patient  Indications comment:  History rectal adenocarcinoma, complete clinical response after chemoradiation  Scope type:  Rigid sigmoidoscope  Distance inserted  (cm):  15  Prep quality:  Good  Withdraw and retroflex quality:  Good   Scarred mucosa in the distal rectum. Healthy anterior midline postradiation scar with complete response, prior rectal cancer site    Return in 3 months for office sigmoidoscopy      12/16/24 Dr. Calix  VAS upper limb duplex ordered-4mm thrombus in mid superior vena cava on recent CT. If clot persistent we will start on low dose anticoagulation  CT and MRI ordered  Return in 4 months        12/17/24 VAS upper limb venous duplex   CONCLUSION:  Impression  RIGHT UPPER LIMB:  No evidence of acute or chronic deep vein thrombosis.  No evidence of superficial thrombophlebitis noted.  Doppler evaluation shows a normal response to augmentation maneuvers.     LEFT UPPER LIMB LIMITED:  Evaluation shows no evidence of thrombus in the internal jugular vein,  subclavian vein, and the innominate vein.     Technical findings were given to Dr. Carrillo at 1430.      Upcoming:  3/21/25 Dr. Leigh  4/7/25 MRI pelvis  4/11/25 CT C/A/P  4/21/25 Dr. Carrillo    Follow up visit     Oncology History   Rectal adenocarcinoma (HCC)   8/22/2023 Initial Diagnosis    Rectal adenocarcinoma (HCC)     8/22/2023 Biopsy    Final Diagnosis  F. Rectum, biopsy rectal mass:  - Adenocarcinoma, superficial fragments. See note.  - MMR panel is pending.     9/14/2023 -  Cancer Staged    Staging form: Colon and Rectum, AJCC 8th Edition  - Clinical stage from 9/14/2023: Stage IIIC (cT4b, cN1, cM0) - Signed by Aristides Carrillo MD on 9/14/2023  Stage prefix: Initial diagnosis  Histologic grade (G): GX  Histologic grading system: 4 grade system       9/25/2023 - 1/8/2024 Chemotherapy    alteplase (CATHFLO), 2 mg, Intracatheter, Every 1 Minute as needed, 8 of 8 cycles  fluorouracil (ADRUCIL), 400 mg/m2 = 875 mg, Intravenous, Once, 8 of 8 cycles  Administration: 875 mg (9/25/2023), 875 mg (10/9/2023), 875 mg (10/23/2023), 875 mg (11/6/2023), 875 mg (11/20/2023), 875 mg (12/4/2023), 875 mg  (12/18/2023), 875 mg (1/2/2024)  leucovorin calcium IVPB, 876 mg, Intravenous, Once, 8 of 8 cycles  Administration: 900 mg (9/25/2023), 900 mg (10/9/2023), 900 mg (10/23/2023), 900 mg (11/6/2023), 900 mg (11/20/2023), 900 mg (12/4/2023), 900 mg (12/18/2023), 900 mg (1/2/2024)  oxaliplatin (ELOXATIN) chemo infusion, 85 mg/m2 = 186.15 mg, Intravenous, Once, 8 of 8 cycles  Administration: 186.15 mg (9/25/2023), 186.15 mg (10/9/2023), 186.15 mg (10/23/2023), 186.15 mg (11/6/2023), 186.15 mg (11/20/2023), 186.15 mg (12/4/2023), 186.15 mg (12/18/2023), 186.15 mg (1/2/2024)  fluorouracil (ADRUCIL) ambulatory infusion Soln, 1,200 mg/m2/day = 5,255 mg, Intravenous, Over 46 hours, 8 of 8 cycles     1/15/2024 -  Chemotherapy    alteplase (CATHFLO), 2 mg, Intracatheter, Every 1 Minute as needed, 5 of 5 cycles  fluorouracil (ADRUCIL) ambulatory infusion Soln, 225 mg/m2/day = 2,440 mg, Intravenous, Over 120 hours, 5 of 5 cycles      Radiation    Plan ID Energy Fractions Dose per Fraction (cGy) Dose Correction (cGy) Total Dose Delivered (cGy) Elapsed Days   CD Pelvis RA 6X 3 / 3 180 0 540 4   Wh Pelvis RA 6X 25 / 25 180 0 4,500 35      Treatment Dates:  1/16/2024 - 2/26/2024.          Review of Systems:  Review of Systems   Constitutional:  Positive for fatigue.   HENT: Negative.     Eyes: Negative.    Respiratory: Negative.     Cardiovascular: Negative.    Gastrointestinal:  Positive for diarrhea (mostly soft). Negative for anal bleeding, blood in stool, constipation and rectal pain.   Endocrine: Negative.    Genitourinary: Negative.    Musculoskeletal:  Positive for arthralgias and back pain.   Skin: Negative.    Allergic/Immunologic: Positive for environmental allergies.   Neurological:  Positive for headaches (mild yesterday).   Hematological:  Bruises/bleeds easily.   Psychiatric/Behavioral:  The patient is nervous/anxious.          Health Maintenance   Topic Date Due    Hepatitis C Screening  Never done    Kidney Health  Evaluation: Albumin/Creatinine Ratio  Never done    Pneumococcal Vaccine: Pediatrics (0 to 5 Years) and At-Risk Patients (6 to 64 Years) (1 of 2 - PCV) Never done    Diabetic Foot Exam  Never done    DM Eye Exam  Never done    HIV Screening  Never done    BMI: Followup Plan  Never done    Annual Physical  Never done    Zoster Vaccine (1 of 2) Never done    DTaP,Tdap,and Td Vaccines (1 - Tdap) 01/02/2009    COVID-19 Vaccine (2 - Hesham risk series) 05/03/2021    HEMOGLOBIN A1C  12/16/2023    Influenza Vaccine (1) Never done    Depression Screening  07/17/2025    Colorectal Cancer Screening  09/20/2025    Lung Cancer Screening  10/09/2025    Kidney Health Evaluation: GFR  12/09/2025    BMI: Adult  12/16/2025    Meningococcal B Vaccine  Aged Out    RSV Vaccine age 0-20 Months  Aged Out    HIB Vaccine  Aged Out    IPV Vaccine  Aged Out    Hepatitis A Vaccine  Aged Out    Meningococcal ACWY Vaccine  Aged Out    HPV Vaccine  Aged Out     Patient Active Problem List   Diagnosis    Diabetes mellitus (HCC)    Dyslipidemia    Rectal adenocarcinoma (HCC)    Regional lymph node metastasis present (HCC)    Abnormal tumor markers    History of hyperlipidemia    Chemotherapy-induced nausea    Chemotherapy induced neutropenia (HCC)    Cancer related pain    Port-A-Cath in place    Insomnia    HTN (hypertension)    Tired    Thrombus    History of hypertension     Past Medical History:   Diagnosis Date    Cancer (HCC)     rectal gone with treatment    Diabetes mellitus (HCC)     Hyperlipidemia      Past Surgical History:   Procedure Laterality Date    COLONOSCOPY      GALLBLADDER SURGERY  2013    IR PORT PLACEMENT  09/18/2023    IR PORT REMOVAL  8/28/2024    TONSILLECTOMY      UMBILICAL HERNIA REPAIR  2013     Family History   Problem Relation Age of Onset    No Known Problems Mother     No Known Problems Father      Social History     Socioeconomic History    Marital status: /Civil Union     Spouse name: Not on file     Number of children: Not on file    Years of education: Not on file    Highest education level: Not on file   Occupational History    Not on file   Tobacco Use    Smoking status: Former     Current packs/day: 0.00     Average packs/day: 2.0 packs/day for 30.0 years (60.0 ttl pk-yrs)     Types: Cigarettes     Start date:      Quit date:      Years since quittin.0     Passive exposure: Past    Smokeless tobacco: Never   Vaping Use    Vaping status: Never Used   Substance and Sexual Activity    Alcohol use: Never    Drug use: Never    Sexual activity: Not on file   Other Topics Concern    Not on file   Social History Narrative    Not on file     Social Drivers of Health     Financial Resource Strain: Not on file   Food Insecurity: Not on file   Transportation Needs: Not on file   Physical Activity: Not on file   Stress: Not on file   Social Connections: Not on file   Intimate Partner Violence: Not on file   Housing Stability: Not on file       Current Outpatient Medications:     buPROPion (WELLBUTRIN XL) 300 mg 24 hr tablet, Take 300 mg by mouth daily, Disp: , Rfl:     escitalopram (LEXAPRO) 20 mg tablet, Take 20 mg by mouth daily, Disp: , Rfl:     Jardiance 25 MG TABS, Take 25 mg by mouth daily, Disp: , Rfl:     lisinopril (ZESTRIL) 5 mg tablet, Take 5 mg by mouth daily, Disp: , Rfl:     metFORMIN (GLUCOPHAGE) 1000 MG tablet, Take 1,000 mg by mouth 2 (two) times a day, Disp: , Rfl:     rosuvastatin (CRESTOR) 20 MG tablet, Take 20 mg by mouth daily, Disp: , Rfl:     ibuprofen (MOTRIN) 200 mg tablet, Take 600 mg by mouth every 6 (six) hours as needed for headaches (Patient not taking: Reported on 12/10/2024), Disp: , Rfl:     Jardiance 10 MG TABS tablet, Take 10 mg by mouth daily (Patient not taking: Reported on 12/10/2024), Disp: , Rfl:     lidocaine-prilocaine (EMLA) cream, Apply topically as needed for mild pain (Patient not taking: Reported on 2024), Disp: 1 g, Rfl: 1    ondansetron (ZOFRAN) 8 mg  tablet, Take one tablet by mouth every 8 hours an needed for nausea (Patient not taking: Reported on 7/17/2024), Disp: 20 tablet, Rfl: 0    prochlorperazine (COMPAZINE) 10 mg tablet, Take 1 tablet (10 mg total) by mouth every 6 (six) hours as needed for nausea or vomiting (Patient not taking: Reported on 7/17/2024), Disp: 30 tablet, Rfl: 0    traMADol (Ultram) 50 mg tablet, Take 1 tablet (50 mg total) by mouth every 4 (four) hours as needed for moderate pain (Patient not taking: Reported on 7/17/2024), Disp: 120 tablet, Rfl: 0  Allergies   Allergen Reactions    Atorvastatin Myalgia     Vitals:    01/27/25 0828   BP: 138/80   Pulse: 89   Resp: 16   Temp: 97.8 °F (36.6 °C)   SpO2: 97%   Weight: 95.9 kg (211 lb 6.4 oz)      Pain Score: 0-No pain

## 2025-01-27 NOTE — PROGRESS NOTES
Follow-up Visit   Name: Remi Olivares      : 1965      MRN: [id number]  Encounter Provider: Neo Tsang MD  Encounter Date: 2025   Encounter department: [facility] RADIATION ONCOLOGY  :  Assessment & Plan  Rectal adenocarcinoma (HCC)  Mr. Olivares is a 59 year old man with stage IIIC [id number] distal rectal adenocarcinoma and prostate involvement who presented for consideration of radiotherapy as a component of total neoadjuvant therapy.  He completed initial FOLFOX and long course chemoradiation (24 - 24).  He presents for continued follow up.     He has elected to pursue nonoperative management at this time.  He continues follow up with medical oncology and colorectal surgery.  He understands the required examinations and imaging studies.  He will have imaging performed in 2025.   This should be repeated in 10/2025 and he will return after this is completed.  He was encouraged to call with questions/concerns in the interim.         History of Present Illness   Mr. Olivares is a 59 year old man with the above history.    24 Dr. Carrillo  Blood work and CT scan in  to evaluate Port   Follow up in 3 months     24 Colonoscopy  IMPRESSION:  Three polyps measuring 5-9 mm in the ascending colon and hepatic flexure; performed cold snare with piecemeal removal; placed 1 clip successfully  Abnormal mucosa           RECOMMENDATION:  -I will see him in 3 months office visit for rigid proctoscopy  Repeat colonoscopy in 1 year, due: 2025  Personal history of colon cancer  Personal history of colon polyps         10/9/24 CT C/A/P  IMPRESSION:  No evidence for recurrent or metastatic disease.  9 x 7 mm right lower lobe groundglass nodule demonstrating 13 months of stability. Based on current Fleischner Society 2017 Guidelines on incidental pulmonary nodule, follow-up noncontrast CT is recommended at 6-12 months from the initial examination to   confirm  persistence; if stable at that time, additional follow-up CT is recommended for every 2 years until 5 years of stability is demonstrated.  4 mm thrombus in the mid superior vena cava likely related to the previous port.        12/10/24 Rigid proctosigmoidoscopy     Date/Time: 12/10/2024 9:40 AM     Performed by: Eleazar Aguilar MD  Authorized by: Eleazar Aguilar MD    Verbal consent obtained?: Yes    Consent given by:  Patient  Patient identity confirmed:  Verbally with patient  Indications comment:  History rectal adenocarcinoma, complete clinical response after chemoradiation  Scope type:  Rigid sigmoidoscope  Distance inserted (cm):  15  Prep quality:  Good  Withdraw and retroflex quality:  Good   Scarred mucosa in the distal rectum. Healthy anterior midline postradiation scar with complete response, prior rectal cancer site     Return in 3 months for office sigmoidoscopy        12/16/24 Dr. Calix  VAS upper limb duplex ordered-4mm thrombus in mid superior vena cava on recent CT. If clot persistent we will start on low dose anticoagulation  CT and MRI ordered  Return in 4 months         12/17/24 VAS upper limb venous duplex   CONCLUSION:  Impression  RIGHT UPPER LIMB:  No evidence of acute or chronic deep vein thrombosis.  No evidence of superficial thrombophlebitis noted.  Doppler evaluation shows a normal response to augmentation maneuvers.     LEFT UPPER LIMB LIMITED:  Evaluation shows no evidence of thrombus in the internal jugular vein,  subclavian vein, and the innominate vein.     Technical findings were given to Dr. Carrillo at 1430.        Upcoming:  3/21/25 Dr. Leigh  4/7/25 MRI pelvis  4/11/25 CT C/A/P  4/21/25 Dr. Carrillo    Upon interview, he endorses intermittent diarrhea, nonbothersome.  He denies hematochezia or pain.  He denies irritative voiding or weight loss.  He is without additional acute concerns.    Oncology History   Cancer Staging   Rectal adenocarcinoma (HCC)  Staging form:  Colon and Rectum, AJCC 8th Edition  - Clinical stage from 9/14/2023: Stage IIIC (cT4b, cN1, cM0) - Signed by Aristides Carrillo MD on 9/14/2023  Stage prefix: Initial diagnosis  Histologic grade (G): GX  Histologic grading system: 4 grade system  Oncology History   Rectal adenocarcinoma (HCC)   8/22/2023 Initial Diagnosis    Rectal adenocarcinoma (HCC)     8/22/2023 Biopsy    Final Diagnosis  F. Rectum, biopsy rectal mass:  - Adenocarcinoma, superficial fragments. See note.  - MMR panel is pending.     9/14/2023 -  Cancer Staged    Staging form: Colon and Rectum, AJCC 8th Edition  - Clinical stage from 9/14/2023: Stage IIIC (cT4b, cN1, cM0) - Signed by Aristides Carrillo MD on 9/14/2023  Stage prefix: Initial diagnosis  Histologic grade (G): GX  Histologic grading system: 4 grade system       9/25/2023 - 1/8/2024 Chemotherapy    alteplase (CATHFLO), 2 mg, Intracatheter, Every 1 Minute as needed, 8 of 8 cycles  fluorouracil (ADRUCIL), 400 mg/m2 = 875 mg, Intravenous, Once, 8 of 8 cycles  Administration: 875 mg (9/25/2023), 875 mg (10/9/2023), 875 mg (10/23/2023), 875 mg (11/6/2023), 875 mg (11/20/2023), 875 mg (12/4/2023), 875 mg (12/18/2023), 875 mg (1/2/2024)  leucovorin calcium IVPB, 876 mg, Intravenous, Once, 8 of 8 cycles  Administration: 900 mg (9/25/2023), 900 mg (10/9/2023), 900 mg (10/23/2023), 900 mg (11/6/2023), 900 mg (11/20/2023), 900 mg (12/4/2023), 900 mg (12/18/2023), 900 mg (1/2/2024)  oxaliplatin (ELOXATIN) chemo infusion, 85 mg/m2 = 186.15 mg, Intravenous, Once, 8 of 8 cycles  Administration: 186.15 mg (9/25/2023), 186.15 mg (10/9/2023), 186.15 mg (10/23/2023), 186.15 mg (11/6/2023), 186.15 mg (11/20/2023), 186.15 mg (12/4/2023), 186.15 mg (12/18/2023), 186.15 mg (1/2/2024)  fluorouracil (ADRUCIL) ambulatory infusion Soln, 1,200 mg/m2/day = 5,255 mg, Intravenous, Over 46 hours, 8 of 8 cycles     1/15/2024 -  Chemotherapy    alteplase (CATHFLO), 2 mg, Intracatheter, Every 1 Minute as needed, 5 of 5  cycles  fluorouracil (ADRUCIL) ambulatory infusion Soln, 225 mg/m2/day = 2,440 mg, Intravenous, Over 120 hours, 5 of 5 cycles      Radiation    Plan ID Energy Fractions Dose per Fraction (cGy) Dose Correction (cGy) Total Dose Delivered (cGy) Elapsed Days   CD Pelvis RA 6X 3 / 3 180 0 540 4   Wh Pelvis RA 6X 25 / 25 180 0 4,500 35      Treatment Dates:  1/16/2024 - 2/26/2024.         Review of Systems Refer to nursing note.      Current Outpatient Medications:     buPROPion (WELLBUTRIN XL) 300 mg 24 hr tablet, Take 300 mg by mouth daily, Disp: , Rfl:     escitalopram (LEXAPRO) 20 mg tablet, Take 20 mg by mouth daily, Disp: , Rfl:     Jardiance 25 MG TABS, Take 25 mg by mouth daily, Disp: , Rfl:     lisinopril (ZESTRIL) 5 mg tablet, Take 5 mg by mouth daily, Disp: , Rfl:     metFORMIN (GLUCOPHAGE) 1000 MG tablet, Take 1,000 mg by mouth 2 (two) times a day, Disp: , Rfl:     rosuvastatin (CRESTOR) 20 MG tablet, Take 20 mg by mouth daily, Disp: , Rfl:     ibuprofen (MOTRIN) 200 mg tablet, Take 600 mg by mouth every 6 (six) hours as needed for headaches (Patient not taking: Reported on 12/10/2024), Disp: , Rfl:     Jardiance 10 MG TABS tablet, Take 10 mg by mouth daily (Patient not taking: Reported on 12/10/2024), Disp: , Rfl:     lidocaine-prilocaine (EMLA) cream, Apply topically as needed for mild pain (Patient not taking: Reported on 7/17/2024), Disp: 1 g, Rfl: 1    ondansetron (ZOFRAN) 8 mg tablet, Take one tablet by mouth every 8 hours an needed for nausea (Patient not taking: Reported on 7/17/2024), Disp: 20 tablet, Rfl: 0    prochlorperazine (COMPAZINE) 10 mg tablet, Take 1 tablet (10 mg total) by mouth every 6 (six) hours as needed for nausea or vomiting (Patient not taking: Reported on 7/17/2024), Disp: 30 tablet, Rfl: 0    traMADol (Ultram) 50 mg tablet, Take 1 tablet (50 mg total) by mouth every 4 (four) hours as needed for moderate pain (Patient not taking: Reported on 7/17/2024), Disp: 120 tablet, Rfl: 0         Objective   /80   Pulse 89   Temp 97.8 °F (36.6 °C)   Resp 16   Wt 95.9 kg (211 lb 6.4 oz)   SpO2 97%   BMI 28.67 kg/m²     Pain Screening:  Pain Score: 0-No pain  ECOG  0  Physical Exam  HENT:      Head: Normocephalic and atraumatic.   Eyes:      General: No scleral icterus.     Extraocular Movements: Extraocular movements intact.   Pulmonary:      Effort: Pulmonary effort is normal.   Abdominal:      General: Abdomen is flat. There is no distension.   Genitourinary:     Comments: Deferred  Musculoskeletal:         General: Normal range of motion.      Cervical back: Normal range of motion.   Skin:     General: Skin is warm and dry.   Neurological:      General: No focal deficit present.      Mental Status: He is alert.   Psychiatric:         Mood and Affect: Mood normal.          Administrative Statements   I have spent a total time of 20 minutes in caring for this patient on the day of the visit/encounter including Diagnostic results, Prognosis, Importance of tx compliance, Impressions, Documenting in the medical record, Reviewing / ordering tests, medicine, procedures  , and Obtaining or reviewing history  .

## 2025-01-27 NOTE — ASSESSMENT & PLAN NOTE
Mr. Olivares is a 59 year old man with stage IIIC [id number] distal rectal adenocarcinoma and prostate involvement who presented for consideration of radiotherapy as a component of total neoadjuvant therapy.  He completed initial FOLFOX and long course chemoradiation (1/16/24 - 2/26/24).  He presents for continued follow up.     He has elected to pursue nonoperative management at this time.  He continues follow up with medical oncology and colorectal surgery.  He understands the required examinations and imaging studies.  He will have imaging performed in 4/2025.   This should be repeated in 10/2025 and he will return after this is completed.  He was encouraged to call with questions/concerns in the interim.

## 2025-03-20 NOTE — PROGRESS NOTES
"Name: Remi Olivares      : 1965      MRN: [id number]  Encounter Provider: Eleazar Aguilar MD  Encounter Date: 3/21/2025   Encounter department: [facility] COLON AND RECTAL SURGERY [place]  :  Assessment & Plan  Rectal adenocarcinoma (HCC)  Completion of chemoradiation therapy 24 neoadjuvant, for low/distal rectal cancer extending to the dentate line, he deferred surgical resection in favor of watchful waiting.    On examination today he has anterior scar palpated, continues with complete response endoscopically, rigid proctoscopy shows only telangiectasia/scar as prior.     PLAN:  6months colonoscopy, will reexamine anorectal junction at that time as well  CC:  Mildred Batista, DO Dr. Gerardo Tsang                History of Present Illness   HPI  Remi Olivares is here today for a rigid proctoscopy for surveillance of rectal cancer. His last office visit was on 12/10/24. He is status post neoadjuvantchemoradiation; completed on 24 and opted out of surgery. He continues to follow up with hematology oncology and was last seen by Dr. Carrillo on 25. He completed the enemas for the appointment today however notes some blood residue after some discomfort, feels he may have \"scratched\" the area.     He is scheduled for CT scan on 25, MRI on 25    Lab Results   Component Value Date    CEA 1.7 2024      Lab Results   Component Value Date    WBC 4.9 2024    HGB 16.7 2024    HCT 49.4 2024    MCV 99 (H) 2024     2024      Lab Results   Component Value Date    SODIUM 140 2024    K 4.8 2024     2024    CO2 19 (L) 2024    AGAP 5 2024    BUN 18 2024    CREATININE 0.98 2024    GLUC 158 (H) 2024    CALCIUM 9.4 2024    AST 20 2024    ALT 29 2024    ALKPHOS 66 2024    TP 6.6 2024    TBILI 0.7 2024    EGFR 90 2024        Review of Systems    Objective   Ht 6' " (1.829 m)   Wt 97.5 kg (215 lb)   BMI 29.16 kg/m²      Physical Exam  Pulmonary:      Effort: Pulmonary effort is normal.   Genitourinary:     Rectum: Normal.   Neurological:      Mental Status: He is alert.   Rigid proctoscopy    Date/Time: 3/21/2025 1:20 PM    Performed by: Eleazar Aguilar MD  Authorized by: Eleazar Aguilar MD    Verbal consent obtained?: Yes    Consent given by:  Patient  Patient identity confirmed:  Verbally with patient  Indications comment:  Hx rectal ca  Scope type:  Rigid sigmoidoscope  Prep quality:  Good  Withdraw and retroflex quality:  Good   Anterior midline, anorectal junction, continued telangiectasia/radiation change/scar, no recurrent/residual lesion.

## 2025-03-21 ENCOUNTER — PROCEDURE VISIT (OUTPATIENT)
Age: 60
End: 2025-03-21
Payer: COMMERCIAL

## 2025-03-21 VITALS — HEIGHT: 72 IN | WEIGHT: 215 LBS | BODY MASS INDEX: 29.12 KG/M2

## 2025-03-21 DIAGNOSIS — C20 RECTAL ADENOCARCINOMA (HCC): Primary | ICD-10-CM

## 2025-03-21 PROCEDURE — 45300 PROCTOSIGMOIDOSCOPY DX: CPT | Performed by: COLON & RECTAL SURGERY

## 2025-03-21 NOTE — ASSESSMENT & PLAN NOTE
Completion of chemoradiation therapy 2/26/24 neoadjuvant, for low/distal rectal cancer extending to the dentate line, he deferred surgical resection in favor of watchful waiting.    On examination today he has anterior scar palpated, continues with complete response endoscopically, rigid proctoscopy shows only telangiectasia/scar as prior.     PLAN:  6months colonoscopy, will reexamine anorectal junction at that time as well  CC:  DO Dr. Gerardo Lantigua Dr.

## 2025-04-07 ENCOUNTER — HOSPITAL ENCOUNTER (OUTPATIENT)
Dept: MRI IMAGING | Facility: HOSPITAL | Age: 60
Discharge: HOME | End: 2025-04-07
Attending: INTERNAL MEDICINE
Payer: COMMERCIAL

## 2025-04-07 DIAGNOSIS — C20 RECTAL ADENOCARCINOMA (HCC): ICD-10-CM

## 2025-04-07 PROCEDURE — 72197 MRI PELVIS W/O & W/DYE: CPT

## 2025-04-07 PROCEDURE — A9585 GADOBUTROL INJECTION: HCPCS | Performed by: INTERNAL MEDICINE

## 2025-04-07 RX ORDER — GADOBUTROL 604.72 MG/ML
10 INJECTION INTRAVENOUS
Status: COMPLETED | OUTPATIENT
Start: 2025-04-07 | End: 2025-04-07

## 2025-04-07 RX ADMIN — GADOBUTROL 10 ML: 604.72 INJECTION INTRAVENOUS at 09:13

## 2025-04-10 LAB
ALBUMIN SERPL-MCNC: 4.5 G/DL (ref 3.8–4.9)
ALP SERPL-CCNC: 68 IU/L (ref 44–121)
ALT SERPL-CCNC: 33 IU/L (ref 0–44)
AST SERPL-CCNC: 18 IU/L (ref 0–40)
BASOPHILS # BLD AUTO: 0 X10E3/UL (ref 0–0.2)
BASOPHILS NFR BLD AUTO: 1 %
BILIRUB SERPL-MCNC: 0.7 MG/DL (ref 0–1.2)
BUN SERPL-MCNC: 15 MG/DL (ref 6–24)
BUN/CREAT SERPL: 15 (ref 9–20)
CALCIUM SERPL-MCNC: 9.5 MG/DL (ref 8.7–10.2)
CEA SERPL-MCNC: 1.6 NG/ML (ref 0–4.7)
CHLORIDE SERPL-SCNC: 102 MMOL/L (ref 96–106)
CO2 SERPL-SCNC: 22 MMOL/L (ref 20–29)
CREAT SERPL-MCNC: 0.97 MG/DL (ref 0.76–1.27)
EGFR: 90 ML/MIN/1.73
EOSINOPHIL # BLD AUTO: 0.1 X10E3/UL (ref 0–0.4)
EOSINOPHIL NFR BLD AUTO: 2 %
ERYTHROCYTE [DISTWIDTH] IN BLOOD BY AUTOMATED COUNT: 13 % (ref 11.6–15.4)
GLOBULIN SER-MCNC: 2 G/DL (ref 1.5–4.5)
GLUCOSE SERPL-MCNC: 176 MG/DL (ref 70–99)
HCT VFR BLD AUTO: 50.1 % (ref 37.5–51)
HGB BLD-MCNC: 17.4 G/DL (ref 13–17.7)
IMM GRANULOCYTES # BLD: 0.1 X10E3/UL (ref 0–0.1)
IMM GRANULOCYTES NFR BLD: 1 %
LYMPHOCYTES # BLD AUTO: 1 X10E3/UL (ref 0.7–3.1)
LYMPHOCYTES NFR BLD AUTO: 20 %
MCH RBC QN AUTO: 33.9 PG (ref 26.6–33)
MCHC RBC AUTO-ENTMCNC: 34.7 G/DL (ref 31.5–35.7)
MCV RBC AUTO: 98 FL (ref 79–97)
MONOCYTES # BLD AUTO: 0.5 X10E3/UL (ref 0.1–0.9)
MONOCYTES NFR BLD AUTO: 10 %
NEUTROPHILS # BLD AUTO: 3.3 X10E3/UL (ref 1.4–7)
NEUTROPHILS NFR BLD AUTO: 66 %
PLATELET # BLD AUTO: 207 X10E3/UL (ref 150–450)
POTASSIUM SERPL-SCNC: 4.9 MMOL/L (ref 3.5–5.2)
PROT SERPL-MCNC: 6.5 G/DL (ref 6–8.5)
RBC # BLD AUTO: 5.14 X10E6/UL (ref 4.14–5.8)
SODIUM SERPL-SCNC: 141 MMOL/L (ref 134–144)
WBC # BLD AUTO: 4.9 X10E3/UL (ref 3.4–10.8)

## 2025-04-11 ENCOUNTER — HOSPITAL ENCOUNTER (OUTPATIENT)
Dept: CT IMAGING | Facility: HOSPITAL | Age: 60
Discharge: HOME | End: 2025-04-11
Attending: INTERNAL MEDICINE
Payer: COMMERCIAL

## 2025-04-11 DIAGNOSIS — C20 RECTAL ADENOCARCINOMA (HCC): ICD-10-CM

## 2025-04-11 PROCEDURE — 74160 CT ABDOMEN W/CONTRAST: CPT

## 2025-04-11 PROCEDURE — 71260 CT THORAX DX C+: CPT

## 2025-04-11 RX ADMIN — IOHEXOL 75 ML: 350 INJECTION, SOLUTION INTRAVENOUS at 08:38

## 2025-04-14 ENCOUNTER — RESULTS FOLLOW-UP (OUTPATIENT)
Dept: HEMATOLOGY ONCOLOGY | Facility: CLINIC | Age: 60
End: 2025-04-14

## 2025-04-21 ENCOUNTER — OFFICE VISIT (OUTPATIENT)
Dept: HEMATOLOGY ONCOLOGY | Facility: CLINIC | Age: 60
End: 2025-04-21
Payer: COMMERCIAL

## 2025-04-21 VITALS
OXYGEN SATURATION: 96 % | HEART RATE: 77 BPM | HEIGHT: 72 IN | SYSTOLIC BLOOD PRESSURE: 118 MMHG | TEMPERATURE: 98.3 F | DIASTOLIC BLOOD PRESSURE: 62 MMHG | BODY MASS INDEX: 29.26 KG/M2 | RESPIRATION RATE: 18 BRPM | WEIGHT: 216 LBS

## 2025-04-21 DIAGNOSIS — C20 RECTAL ADENOCARCINOMA (HCC): Primary | ICD-10-CM

## 2025-04-21 DIAGNOSIS — C77.9 REGIONAL LYMPH NODE METASTASIS PRESENT (HCC): ICD-10-CM

## 2025-04-21 DIAGNOSIS — I82.90 THROMBUS: ICD-10-CM

## 2025-04-21 DIAGNOSIS — Z86.39 HISTORY OF HYPERLIPIDEMIA: ICD-10-CM

## 2025-04-21 DIAGNOSIS — Z86.79 HISTORY OF HYPERTENSION: ICD-10-CM

## 2025-04-21 DIAGNOSIS — Z86.39 HISTORY OF DIABETES MELLITUS: ICD-10-CM

## 2025-04-21 PROCEDURE — 99214 OFFICE O/P EST MOD 30 MIN: CPT | Performed by: INTERNAL MEDICINE

## 2025-04-21 RX ORDER — DIAZEPAM 10 MG/1
10 TABLET ORAL EVERY 12 HOURS PRN
COMMUNITY
Start: 2025-04-02

## 2025-04-21 RX ORDER — SILDENAFIL 100 MG/1
100 TABLET, FILM COATED ORAL AS NEEDED
COMMUNITY
Start: 2025-01-23

## 2025-04-21 NOTE — ASSESSMENT & PLAN NOTE
Low rectal cancer was diagnosed in 2023 and patient had neoadjuvant FOLFOX followed by infusion 5-FU and radiation and he did not go for surgery and has remained under surveillance.  Prior to surgery patient had CT scans, MRI of the pelvis, sigmoidoscopy and EGD and these test results were negative for malignancy.  Patient had endoscopy in April 2024  and that showed hiatal hernia.  He had a rigid proctosigmoidoscopy in 2024 and rigid proctoscopy in March 2025 and there was no malignancy.  Patient has some gurgling in his stomach at times and CT scan has shown hiatal hernia and he will discuss this with his gastroenterologist Dr. Quinones.  Orders:    CBC and differential; Future    Comprehensive metabolic panel; Future    CEA; Future

## 2025-04-21 NOTE — ASSESSMENT & PLAN NOTE
Incidental finding on previous CT scan of 4 mm thrombus in the mid superior vena cava likely related to previous port.  - VAS upper limb venous duplex scan, unilateral/limited

## 2025-04-21 NOTE — PROGRESS NOTES
Name: Remi Olivares      : 1965      MRN: [id number]  Encounter Provider: Aristides Carrillo MD  Encounter Date: 2025   Encounter department: [facility] [place]  :  Assessment & Plan  Rectal adenocarcinoma (HCC)  Low rectal cancer was diagnosed in  and patient had neoadjuvant FOLFOX followed by infusion 5-FU and radiation and he did not go for surgery and has remained under surveillance.  Prior to surgery patient had CT scans, MRI of the pelvis, sigmoidoscopy and EGD and these test results were negative for malignancy.  Patient had endoscopy in 2024  and that showed hiatal hernia.  He had a rigid proctosigmoidoscopy in  and rigid proctoscopy in 2025 and there was no malignancy.  Patient has some gurgling in his stomach at times and CT scan has shown hiatal hernia and he will discuss this with his gastroenterologist Dr. Quinones.  Orders:    CBC and differential; Future    Comprehensive metabolic panel; Future    CEA; Future    Regional lymph node metastasis present (HCC)  See above.       Thrombus  Incidental finding on previous CT scan of 4 mm thrombus in the mid superior vena cava likely related to previous port.  - VAS upper limb venous duplex scan, unilateral/limited       History of hypertension  Being managed by PCP       History of diabetes mellitus  Being managed by PCP       History of hyperlipidemia  Being managed by PCP           Blood work prior to next visit in 6 months.  Rectal cancer remains in remission.  Goal is cure from rectal cancer.  All discussed in detail.  Questions answered.  Patient will discuss gurgling and hiatal hernia with his gastroenterologist.  Patient is capable of self-care.  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  I suggested eating healthy foods, staying active but to avoid falls and trauma.  Suggested health screening tests. Patient to continue to follow-up with primary physician and other consultants.   Provided counseling and support.  I used a dictation device to dictate this note and there could be mistakes in my note and for that patient may contact my office.      History of Present Illness   Chief Complaint   Patient presents with    Follow-up   .  Patient is here with his wife.  Low rectal cancer was diagnosed in August 2023 and the patient had neoadjuvant FOLFOX chemotherapy followed by infusion 5-FU and concurrent radiation and he decided not to have surgery.  He had CT scan, MRI scan of pelvis and sigmoidoscopy and EGD and results did not show clinical evidence of malignancy.  He he decided to have surveillance.  Patient had endoscopy in April 2024  and that showed hiatal hernia.  He had a rigid proctosigmoidoscopy in 2024 and rigid proctoscopy in March 2025 and there was no malignancy.  Patient has some gurgling in his stomach at times and CT scan has shown hiatal hernia and he will discuss this with his gastroenterologist Dr. Quinones.  Improvement in bowel symptoms.  Oncology History   Cancer Staging   Rectal adenocarcinoma (HCC)  Staging form: Colon and Rectum, AJCC 8th Edition  - Clinical stage from 9/14/2023: Stage IIIC (cT4b, cN1, cM0) - Signed by Aristides Carrillo MD on 9/14/2023  Stage prefix: Initial diagnosis  Histologic grade (G): GX  Histologic grading system: 4 grade system  Oncology History   Rectal adenocarcinoma (HCC)   8/22/2023 Initial Diagnosis    Rectal adenocarcinoma (HCC)     8/22/2023 Biopsy    Final Diagnosis  F. Rectum, biopsy rectal mass:  - Adenocarcinoma, superficial fragments. See note.  - MMR panel is pending.     9/14/2023 -  Cancer Staged    Staging form: Colon and Rectum, AJCC 8th Edition  - Clinical stage from 9/14/2023: Stage IIIC (cT4b, cN1, cM0) - Signed by Aristides Carrillo MD on 9/14/2023  Stage prefix: Initial diagnosis  Histologic grade (G): GX  Histologic grading system: 4 grade system       9/25/2023 - 1/8/2024 Chemotherapy    alteplase (CATHFLO), 2 mg,  Intracatheter, Every 1 Minute as needed, 8 of 8 cycles  fluorouracil (ADRUCIL), 400 mg/m2 = 875 mg, Intravenous, Once, 8 of 8 cycles  Administration: 875 mg (9/25/2023), 875 mg (10/9/2023), 875 mg (10/23/2023), 875 mg (11/6/2023), 875 mg (11/20/2023), 875 mg (12/4/2023), 875 mg (12/18/2023), 875 mg (1/2/2024)  leucovorin calcium IVPB, 876 mg, Intravenous, Once, 8 of 8 cycles  Administration: 900 mg (9/25/2023), 900 mg (10/9/2023), 900 mg (10/23/2023), 900 mg (11/6/2023), 900 mg (11/20/2023), 900 mg (12/4/2023), 900 mg (12/18/2023), 900 mg (1/2/2024)  oxaliplatin (ELOXATIN) chemo infusion, 85 mg/m2 = 186.15 mg, Intravenous, Once, 8 of 8 cycles  Administration: 186.15 mg (9/25/2023), 186.15 mg (10/9/2023), 186.15 mg (10/23/2023), 186.15 mg (11/6/2023), 186.15 mg (11/20/2023), 186.15 mg (12/4/2023), 186.15 mg (12/18/2023), 186.15 mg (1/2/2024)  fluorouracil (ADRUCIL) ambulatory infusion Soln, 1,200 mg/m2/day = 5,255 mg, Intravenous, Over 46 hours, 8 of 8 cycles     1/15/2024 -  Chemotherapy    alteplase (CATHFLO), 2 mg, Intracatheter, Every 1 Minute as needed, 5 of 5 cycles  fluorouracil (ADRUCIL) ambulatory infusion Soln, 225 mg/m2/day = 2,440 mg, Intravenous, Over 120 hours, 5 of 5 cycles      Radiation    Plan ID Energy Fractions Dose per Fraction (cGy) Dose Correction (cGy) Total Dose Delivered (cGy) Elapsed Days   CD Pelvis RA 6X 3 / 3 180 0 540 4   Wh Pelvis RA 6X 25 / 25 180 0 4,500 35      Treatment Dates:  1/16/2024 - 2/26/2024.         Pertinent Medical History   See details in HPI  04/21/25:      Review of Systems  Reviewed 12 systems.  Symptoms are as in HPI.  No fevers, chills, bleeding, bone pains, skin rash, weight loss, night sweats, weakness and tiredness and no swelling of the ankles and no swollen glands and no other neurological, cardiac, pulmonary, GI or  symptoms other than listed in the HPI.      Objective   /62 (BP Location: Left arm, Patient Position: Sitting, Cuff Size: Large)   Pulse  77   Temp 98.3 °F (36.8 °C) (Temporal)   Resp 18   Ht 6' (1.829 m)   Wt 98 kg (216 lb)   SpO2 96%   BMI 29.29 kg/m²     Pain Screening:  Pain Score: 0-No pain  ECOG   0  Physical Exam  Vitals reviewed.   Constitutional:       General: He is not in acute distress.     Appearance: Normal appearance. He is not ill-appearing.   HENT:      Head: Normocephalic and atraumatic.      Mouth/Throat:      Comments: No thrush.  Eyes:      General: No scleral icterus.     Conjunctiva/sclera: Conjunctivae normal.   Cardiovascular:      Rate and Rhythm: Normal rate and regular rhythm.      Heart sounds: Normal heart sounds. No murmur heard.  Pulmonary:      Effort: Pulmonary effort is normal. No respiratory distress.      Breath sounds: Normal breath sounds. No rhonchi or rales.   Abdominal:      General: Abdomen is flat. There is no distension.      Palpations: Abdomen is soft. There is no mass.      Tenderness: There is no abdominal tenderness.      Comments: No ascites.    Musculoskeletal:         General: No swelling or tenderness. Normal range of motion.      Cervical back: Normal range of motion. No rigidity or tenderness.      Right lower leg: No edema.      Left lower leg: No edema.      Comments: No calf tenderness.   Lymphadenopathy:      Cervical: No cervical adenopathy.      Upper Body:      Right upper body: No supraclavicular or axillary adenopathy.      Left upper body: No supraclavicular or axillary adenopathy.   Skin:     General: Skin is warm.      Coloration: Skin is not jaundiced or pale.      Findings: No bruising or rash.      Nails: There is no clubbing.   Neurological:      General: No focal deficit present.      Mental Status: He is alert and oriented to person, place, and time.      Motor: No weakness.      Coordination: Coordination normal.      Gait: Gait normal.   Psychiatric:         Mood and Affect: Mood normal.         Behavior: Behavior normal.         Thought Content: Thought content normal.          Labs: I have reviewed the following labs:  Lab Results   Component Value Date/Time    WBC 4.31 08/23/2024 11:45 AM    White Blood Cell Count 4.9 04/09/2025 07:20 AM    RBC 4.61 08/23/2024 11:45 AM    Red Blood Cell Count 5.14 04/09/2025 07:20 AM    Hemoglobin 17.4 04/09/2025 07:20 AM    Hemoglobin 15.0 08/23/2024 11:45 AM    Hematocrit 44.5 08/23/2024 11:45 AM    HCT 50.1 04/09/2025 07:20 AM    MCV 98 (H) 04/09/2025 07:20 AM    MCV 97 08/23/2024 11:45 AM    MCH 33.9 (H) 04/09/2025 07:20 AM    MCH 32.5 08/23/2024 11:45 AM    RDW 13.0 04/09/2025 07:20 AM    RDW 13.2 08/23/2024 11:45 AM    Platelet Count 207 04/09/2025 07:20 AM    Platelets 222 08/23/2024 11:45 AM    Segmented % 69 08/23/2024 11:45 AM    Neutrophils 66 04/09/2025 07:20 AM    Lymphocytes % 17 08/23/2024 11:45 AM    Lymphocytes 20 04/09/2025 07:20 AM    Monocytes % 10 08/23/2024 11:45 AM    Monocytes 10 04/09/2025 07:20 AM    Eosinophils Relative 2 08/23/2024 11:45 AM    Eosinophils 2 04/09/2025 07:20 AM    Basophils Relative 1 08/23/2024 11:45 AM    Immature Grans % 1 08/23/2024 11:45 AM    Absolute Neutrophils 3.05 08/23/2024 11:45 AM    Neutrophils (Absolute) 3.3 04/09/2025 07:20 AM     Lab Results   Component Value Date/Time    Potassium 4.9 04/09/2025 07:20 AM    Chloride 102 04/09/2025 07:20 AM    CO2 22 04/09/2025 07:20 AM    BUN 15 04/09/2025 07:20 AM    Creatinine 0.97 04/09/2025 07:20 AM    Creatinine 0.70 08/23/2024 11:45 AM    Calcium 9.4 08/23/2024 11:45 AM    AST 18 04/09/2025 07:20 AM    ALT 33 04/09/2025 07:20 AM    Alkaline Phosphatase 66 08/23/2024 11:45 AM    Protein, Total 6.5 04/09/2025 07:20 AM    Albumin 4.5 04/09/2025 07:20 AM    Albumin 4.4 08/23/2024 11:45 AM    TOTAL BILIRUBIN 0.7 04/09/2025 07:20 AM    eGFR 90 04/09/2025 07:20 AM    eGFR 104 08/23/2024 11:45 AM     0 Result Notes            Component  Ref Range & Units (hover) 4/9/25  7:20 AM 12/9/24  7:15 AM 8/23/24 11:45 AM 6/3/24  8:25 AM 1/19/24  3:00 PM 11/16/23   6:41 AM 9/22/23  7:47 AM   CEA 1.6 1.7 CM 1.5 R, CM 1.4 R, CM 4.1 High  R, CM 5.1 High  CM 33.4 High  CM   Comment:                              Nonsmokers          <3.9                               Smokers             <5.6        CT chest and abdomen w contrast  Status: Final result     PACS Images - GE     Show images for CT chest and abdomen w contrast  PACS Images - Sectra     Show images for CT chest and abdomen w contrast    CT chest and abdomen w contrast: Result Notes     Aristides Carrillo MD  4/14/2025 12:44 PM EDT       Patient has appointment on 4/21/25            Study Result    Narrative & Impression   CT CHEST AND ABDOMEN WITH IV CONTRAST     INDICATION: History of rectal adenocarcinoma.     COMPARISON: Pelvic MRI dated 4/7/2025. CT dated 10/9/2024 and 3/14/2024 and 9/7/2023.     TECHNIQUE: CT examination of the chest and abdomen was performed. Dual energy CT scan technique (DECT) was employed. Virtual unenhanced and iodine map series were generated and reviewed. Multiplanar 2D reformatted images were created from the source   data.     This examination, like all CT scans performed in the [facility], was performed utilizing techniques to minimize radiation dose exposure, including the use of iterative reconstruction and automated exposure control. Radiation dose length   product (DLP) for this visit: 1035 mGy-cm     IV Contrast: 75 mL of iohexol  Enteric Contrast: Administered.     FINDINGS:     CHEST     LUNGS: No acute consolidation.  No interstitial thickening.  No endobronchial lesions. Few minuscule benign calcified granulomata. Minuscule groundglass focus in the posterior right lower lobe, potentially a focus of scarring, appears unchanged since   2023. No suspicious pulmonary nodules or masses.     PLEURA: Unremarkable.     HEART/GREAT VESSELS: Heart size normal. Valvular and coronary calcifications. No pericardial effusion. Thoracic aorta and arteries are unremarkable.      MEDIASTINUM AND GIANA: Hiatal hernia discussed below. No lymphadenopathy or mass.     CHEST WALL AND LOWER NECK: Unremarkable.     ABDOMEN     LIVER/BILIARY TREE: Apparent hepatic hypoenhancement relative to expectation may suggest steatosis, though specificity of CT is decreased after contrast administration.     GALLBLADDER: Post cholecystectomy.     SPLEEN: Unremarkable.     PANCREAS: Unremarkable.     ADRENAL GLANDS: Unremarkable.     KIDNEYS/VISUALIZED URETERS: Unremarkable. No hydronephrosis.     STOMACH AND VISUALIZED BOWEL: Large type II parahiatal hernia. Gastroesophageal junction is below the diaphragm with the esophagus ascending through the normal hiatus. There is a small slip of normal diaphragmatic muscle between the hiatus and the left   for diaphragmatic defect. Slip measures 1.2 cm. The gastric cardia and proximal body ascend through the parahiatal defect. Rugal folds within the herniated stomach shows pseudo thickening due to distortion which is unchanged since at least 2023. No acute   inflammatory perigastric stranding.     Noninflamed periampullary duodenal diverticulum. No dilated or inflamed loops of small bowel within the scan length. Oral contrast has progressed to the colon. No colonic inflammation within the scan length.     Normal appendix.     ABDOMINAL CAVITY: No ascites. No pneumoperitoneum. No lymphadenopathy.     VESSELS: Aortoiliac and branch vessel atherosclerosis without aneurysm.     ABDOMINAL WALL: Unremarkable.     BONES: No acute fracture or suspicious osseous lesion.     IMPRESSION:     1.  No evidence for jagdish or hematogenous spread of disease in the visualized portion of the chest and abdomen.     2.  No acute thoracic or abdominal findings.     3.  Large paraesophageal diaphragmatic hernia. Hernia is atypical in that there is a 1.2 cm thick slip of the diaphragm between the true hiatus and the left diaphragmatic defect. That is, this is a PARAhiatal hernia rather than a  more conventional hiatal   hernia.     Workstation performed: [id number]        Imaging    CT chest and abdomen w contrast (Order: [id number]) - 4/11/2025    Result History    EGD  Order# [id number]  Reading physician: Yannick Quinones DO Ordering provider: Yannick Quinones DO Study date: 4/15/24     Result Information    Status: Final result (Exam End: 4/15/2024 10:05 AM) Provider Status: Open     PACS Images     Show images for EGD  PACS Images - Sectra     Show images for EGD  External Procedure Report    Open External Result Report     Result Text    Result Text   [facility]  [address] [facility] [address]  [phone]  [phone]        DATE OF SERVICE:  4/15/24     PHYSICIAN(S):  Attending:   Yannick Quinones DO      Fellow:   No Staff Documented         INDICATION:  Abnormal CT, esophagus     POST-OP DIAGNOSIS:  See the impression below.     PREPROCEDURE:  Informed consent was obtained for the procedure, including sedation.  Risks of perforation, hemorrhage, adverse drug reaction and aspiration were discussed. The patient was placed in the left lateral decubitus position.     Patient was explained about the risks and benefits of the procedure. Risks including but not limited to bleeding, infection, and perforation were explained in detail. Also explained about less than 100% sensitivity with the exam and other alternatives.     PROCEDURE: EGD     DETAILS OF PROCEDURE:   Patient was taken to the procedure room where a time out was performed to confirm correct patient and correct procedure. The patient underwent monitored anesthesia care, which was administered by an anesthesia professional. The patient's blood pressure, heart rate, level of consciousness, respirations, oxygen, ECG and ETCO2 were monitored throughout the procedure. The scope was introduced through the mouth and advanced to the third part of the duodenum. Retroflexion was performed in the fundus. The patient  experienced no blood loss. The procedure was not difficult. The patient tolerated the procedure well. There were no apparent adverse events.      ANESTHESIA INFORMATION:  ASA: II  Anesthesia Type: IV Sedation with Anesthesia     MEDICATIONS:  lactated ringers infusion 150 mL*               *From user-documented volume   (Totals for administrations occurring from 0941 to 1006 on 04/15/24)         FINDINGS:  The duodenum appeared normal.  The stomach appeared normal.  2 cm herniation of gastric fundus into thoracic cavity (type II hiatal hernia) - GE junction 38 cm from the incisors, diaphragmatic impression 40 cm from the incisors:  Hill classification: Grade III  Benign-appearing intrinsic stricture (traversable) in the GE junction; performed cold forceps biopsy; dilated with balloon dilator from 20 mm starting size. Dilation caused disruption of ring        SPECIMENS:  * No specimens in log *        IMPRESSION:  Paraesophageal hernia  Benign appearing stricture at gastroesophageal junction, biopsied and balloon dilated to 20 mm  Normal stomach and duodenum     RECOMMENDATION:  Start omeprazole once daily while biopsies are pending  Endoscopist will call with biopsy results within 2 weeks           Yannick Quinones DO            Result History    EGD (Order #[id number]) on 4/15/2024 - Order Result History Report     Eleazar Aguilar MD   Physician  Specialty: Colon and Rectal Surgery     Progress Notes     Signed     Encounter Date: 3/21/2025  Procedure Orders   Rigid proctoscopy [[id number]] ordered by Eleazar Aguilar MD     Post-procedure Diagnoses   Rectal adenocarcinoma (HCC) [C20]          Signed       Expand All Collapse All    Name: Remi Olivares      : 1965      MRN: [id number]  Encounter Provider: Eleazar Aguilar MD  Encounter Date: 3/21/2025   Encounter department: [facility] RE[facility] [place]  :  Assessment & Plan  Rectal adenocarcinoma (HCC)  Completion of chemoradiation  therapy 2/26/24 neoadjuvant, for low/distal rectal cancer extending to the dentate line, he deferred surgical resection in favor of watchful waiting.     On examination today he has anterior scar palpated, continues with complete response endoscopically, rigid proctoscopy shows only telangiectasia/scar as prior.     PLAN:  6months colonoscopy, will reexamine anorectal junction at that time as well  CC:  DO Dr. Gerardo Lantigua Dr.                    MRI pelvis w wo contrast  Status: Final result     PACS Images - GE     Show images for MRI pelvis w wo contrast  PACS Images - Sectra     Show images for MRI pelvis w wo contrast  Study Result    Narrative & Impression   MRI PELVIS -WITH AND WITHOUT CONTRAST (RECTAL CANCER RESTAGING)     CLINICAL INFORMATION:  - Pretreatment rectal cancer staging: T4b N1.  - Prior treatment: Neoadjuvant chemoradiation therapy completed 2/26/2024. Patient deferred surgical resection in favor of watchful waiting.     COMPARISON: MRI pelvis rectal cancer restaging 4/4/2024.     TECHNIQUE: Multiplanar/multisequence MRI of the pelvis with and without contrast was performed using rectal cancer imaging protocol. An additional small field of view, axial oblique T2-weighted sequence was performed through the tumor, perpendicular to   the long axis of the rectum at that level. Additional Coronal oblique T2-weighted sequences was performed through the anal canal. Imaging performed on 1.5T MRI     IV Contrast: 10 mL of Gadobutrol injection     FINDINGS:  TREATED TUMOR LOCATION AND CHARACTERISTICS  -  Tumor location: 3.9 cm from the anal verge (series 3 image 22).  -  Distance of the lowest extent of treated tumor from the top of the anal sphincter: 0.8 cm (series 8 images 28), unchanged since prior study.  -  Relationship of treated tumor to the sigmoid takeoff (STO): Completely below sigmoid takeoff.  -  Relationship of treated tumor to anterior peritoneal reflection: Below  -   Craniocaudal length: 3.2 cm currently (series 3 image 23), previously 3.4 cm on 4/4/2024. Pretreatment craniocaudal length was 6.3 cm on 9/11/2023.  -  Maximal wall thickness 0.9 cm currently (series 3 image 23). Previous wall thickness was 1.1 cm on 4/4/2024 and 2.0 cm on 9/11/2023.  -  DWI (restricted DWI and low ADC): Absent.  -  T2W signal characteristics: Entirely dark T2 signal consistent with scar.     ANAL SPHINCTER INVOLVEMENT (FOR LOW RECTAL CA): Absent. Previously on pretreatment rectal MRI 9/11/2023, there was tumor involvement of the posterior half of the upper internal anal sphincter which has since resolved since 4/4/2024 with unchanged 0.8 cm   distance from treated tumor to the top of the anal sphincter (series 8 image 28).     EXTRAMURAL VASCULAR INVASION: No (none evident pre-treatment.)     MESORECTAL FASCIA (MRF) STATUS:  - Not applicable for T4 tumors.  - Is there a suspicious LN with ill defined borders, tumor deposit, or EMVI threatening ( >=  1mm and  <= 2 mm) or invading (< 1 mm) the MRF? No           LYMPH NODES: Post treatment mesorectal/superior rectal lymph nodes and/or tumor deposits >/= 5mm short axis: None.     Suspicious extra-mesorectal locoregional nodes: None.     REST OF THE PELVIS:     REPRODUCTIVE STRUCTURES: Stable dark T2 scarlike tissue abuts the prostate apex (2/39) at the site of prior tumor invasion of the prostate apex on pretreatment MRI 9/11/2023.     URINARY BLADDER: Normal.     OTHER BOWEL LOOPS: Colonic diverticulosis.     PELVIC CAVITY: Unremarkable.     VESSELS: Limited evaluation of the visualized vasculature on this non-contrast MRI is unremarkable.     PELVIC WALL: Unremarkable     BONES: No suspicious osseous lesion. Radiation-induced fatty marrow replacement of the lumbosacral spine.     IMPRESSION:  Unenhanced MRI of the Pelvis (Rectal Protocol)     SINCE MRI PELVIS RECTAL CANCER RESTAGING 4/4/2024, POST TREATMENT PRIMARY TUMOR ASSESSMENT: Complete/near  complete response (please note this imaging appearance does not rule out small volume residual viable tumor). Stable residual dark T2 scar of the   anterior low rectal cancer with stable scarlike tissue abutting the prostate apex at the site of prior tumor invasion of the prostate apex on pretreatment MRI 9/11/2023. Again, the low rectal cancer no longer invades the posterior half of the upper   internal anal sphincter.     SUSPICIOUS MESORECTAL LYMPH NODES: No.     SUSPICIOUS EXTRAMESORECTAL LYMPH NODES: No.           Workstation performed: [id number]        Imaging    MRI pelvis w wo contrast (Order: [id number]) - 4/7/2025

## 2025-07-15 ENCOUNTER — TELEPHONE (OUTPATIENT)
Age: 60
End: 2025-07-15

## 2025-07-15 NOTE — LETTER
Remi Olivares  [address] [place] [id number]        Colonoscopy Preparation: DULCOLAX & MIRALAX    [facility] - [address] [place] [id number] - Joanie Vasquez - [facility]    Performing Physician: Dr. Aguilar    DATE OF PROCEDURE: September 17, 2025     The OR/GI Lab will contact you the evening prior to your procedure with your exact arrival time.    We kindly ask that you immediately notify us of any need to cancel or reschedule your procedure.  _________________________________________________________________    WEEK BEFORE YOUR PROCEDURE:  Do not take Iron tablets for one week.  5 days prior to the appointment AVOID vegetables and fruits with skins or seeds, nuts, corn, popcorn, and whole grain breads.  Purchase: One (1) 238-gram container of Miralax (polyethylene glycol 3350), four (4) 5 mg Dulcolax (bisacodyl) tablets, and 64-ounces of Gatorade (sports drink) - no red, orange, or purple. These may be purchased at any pharmacy without a prescription. Generic products are permissible.  Arrange responsible transportation for day of the procedure.  DAY BEFORE THE PROCEDURE:  Clear liquids only for entire day prior. Nothing red, orange or purple.  You MAY have:  Soda  Water  Broth Gatorade  Jell-O  Popsicles Coffee/Tea without milk/creamer   YOU MAY NOT HAVE:  Solid Foods  Milk and milk products  Juice with pulp  BOWEL PREPARATION: Includes: One (1) 238-gram container of Miralax (polyethylene glycol 3350), four (4) 5 mg Dulcolax (bisacodyl) tablets, and 64-ounces of Gatorade (sports drink). Preparation may be refrigerated. Entire bowel prep should be completed.              Colonoscopy Preparation: DULCOLAX & MIRALAX    Afternoon before the procedure (2:00 pm - 5:00 pm):  Take two (2) 5 mg Dulcolax laxative tablets.    Evening before procedure (6:00 pm):  Mix entire container of Miralax with 64-ounces of Gatorade and shake until all medication is dissolved.  Begin drinking  solution. Drink an eight (8) ounce cup every 10-15 minutes until you have consumed half (32 ounces) of the solution. Refrigerate remaining solution.    Night before the procedure (8:00 pm):  Take two (2) 5 mg Dulcolax laxative tablets.    Beginning 5 hours before your procedure:  Drink the remaining amount of prepared solution (32 ounces). Drink an eight (8) ounce cup every 10-15 minutes until you have consumed the remaining solution.    Bowel prep should be completed 4 hours prior to your procedure time.    NOTHING TO EAT OR DRINK AFTER MIDNIGHT - EXCEPT YOUR BOWEL PREP    DAY OF PROCEDURE:  You may brush your teeth.  Leave all jewelry at home. Take out any facial piercings, if able.  Please arrive for your procedure as indicated by the OR / GI Lab / Endoscopy Unit. The hospital will contact you the day before with your exact arrival time.  Make sure you have arranged ahead of time for a responsible adult (18 or older) to accompany and drive you home after the procedure. Please discuss any transportation concerns with our staff prior to your procedure.  The effects of the anesthesia can persist for 24 hours. After receiving the sedation, you must exercise caution before engaging in any activity that could harm yourself and others (such as driving a car). Do not make any important decisions or do not drink any alcoholic beverages during this time period.  After your procedure, you may have anything you'd like to eat or drink. You will probably want to start with something light. Please include plenty of fluids. Avoid items that cause gas such as sodas and salads.  SPECIAL INSTRUCTIONS:    For patients currently taking blood thinners and/or antiplatelet therapy our office will contact the prescribing provider. Our office will contact you with any required changes to your medication regimen.    Diabetic/Weight loss medication (i.e.- Insulin, GLP1 agonist)  Medication:_______________Hold:_______________days prior to  the procedure    NOTHING TO EAT OR DRINK (INCLUDING CHEWING GUM) AFTER 12 MIDNIGHT PRIOR TO YOUR PROCEDURE EXCEPT YOUR BOWEL PREP.    Thank you for choosing [facility]enter[facility] or [facility] Colon & Rectal Surgery!  [phone] Colon & Rectal Surgery

## 2025-07-15 NOTE — LETTER
[facility]  [address]  [place] [address]  Phone#  [phone]  Fax#  [phone]        Medicine Instructions for Adults with Diabetes who Need a Bowel Prep       Follow these instructions when a BOWEL PREP is required for your procedure or surgery!    NOTE:   GLP-1 Agonists taken weekly: do not take in the 7 days before your procedure   SGLT-2 Inhibitors: do not take in the 4 days before your procedure     On the Day Before Surgery/Procedure  If you are having a procedure (e.g. Colonoscopy) or surgery that requires a bowel prep and you may have at least a clear liquid diet, follow the directions below based on the type of medicine you take for your diabetes.     Type of Medicine You Take Examples What to do   Pre-Mixed Insulin - Intermediate Acting Humalog® 75/25, Humulin® 70/30, Novolog® 70/30, Regular Insulin Take ½ your regular dose the evening before your procedure   Rapid/Fast Acting Insulin Humalog® U200, NovoLog®, Apidra®, Fiasp® Take ½ your regular dose the evening before your procedure.   Long-Acting Insulin Lantus®, Levemir®, Tresiba®, Toujeo®, Basaglar® Take your FULL regular dose the day before procedure   Oral Sulfonylurea Glipizide/Glimepiride/Glucotrol® Take ½ your regular dose the evening before your procedure   Other Oral Diabetes Medicines Metformin®, Glucophage®, Glucophage XR®, Riomet®, Glumetza®), Actose®, Avandia®, Glyset®, Prandin® Take your regular dose with dinner in the evening before your procedure   GLP-1 Agonists AdlyxinÒ, ByettaÒ, BydureonÒ, OzempicÒ, SoliquaÒ, TanzeumÒ, TrulicityÒ, VictozaÒ, Saxenda®, Rybelsus® If taken daily, take as normal    If taken weekly, do not take this medicine for 7 days before your procedure including the day of the procedure (resume taking after the procedure)   SGLT-2 Inhibitors Jardiance®, Invokana®, Farxiga®,   Steglatro®, Brenzavvy®, Qtern®, Segluromet®, Glyxambi®, Synjardy®, Synjardy XR®, Invokamet®, Invokamet  XR®, Trijary XR®, Xigduo XR®, Steglujan® Do not take for 4 days before your procedure including the day of the procedure (resume taking after the procedure)                More information continued on back                        Medicine Instructions for Adults with Diabetes who Need a Bowel Prep  Page 2      On the Day of Surgery/Procedure  Follow the directions below based on the type of medicine you take for your diabetes.     Type of Medicine You Take Examples What to do   Long-Acting Insulin Lantus®, Levemir®, Tresiba®, Toujeo®, Basaglar®, Semglee®   If you usually take your Long-Acting Insulin in the morning, take the full dose as scheduled.   GLP-1 Agonists AdlyxinÒ, ByettaÒ, BydureonÒ, OzempicÒ, SoliquaÒ, TanzeumÒ, TrulicityÒ, VictozaÒ, Saxenda®, Rybelsus® Do NOT take this medicine on the day of your procedure (resume taking after the procedure)       On the Day of Surgery/Procedure (continued)  Except for the morning Long-Acting Insulin, DO NOT take ANY diabetic medicine on the day of your procedure unless you were instructed by the doctor who manages your diabetes medicines.    Continue to check your blood sugars.  If you have an insulin pump, ask your endocrinologist for instructions at least 3 days before your procedure. NOTE: If you are not able to ask your endocrinologist in advance, on the day of the procedure set your insulin pump to your basal rate only. Bring your insulin pump supplies to the hospital.     If you have any questions about taking your diabetes medicines prior to your procedure, please contact the doctor who manages your diabetes medicines.

## 2025-07-16 ENCOUNTER — PREP FOR PROCEDURE (OUTPATIENT)
Age: 60
End: 2025-07-16

## 2025-07-16 DIAGNOSIS — Z85.048 PERSONAL HISTORY OF RECTAL CANCER: Primary | ICD-10-CM

## 2025-07-16 NOTE — TELEPHONE ENCOUNTER
DE pt, 6 mo fc recall, hx rectal hx, BMI 29, diabetic       Scheduled [place] 9/17/25 [facility]   Mailed PW to patient